# Patient Record
Sex: MALE | Race: ASIAN | NOT HISPANIC OR LATINO
[De-identification: names, ages, dates, MRNs, and addresses within clinical notes are randomized per-mention and may not be internally consistent; named-entity substitution may affect disease eponyms.]

---

## 2017-03-08 ENCOUNTER — RX RENEWAL (OUTPATIENT)
Age: 67
End: 2017-03-08

## 2017-03-08 ENCOUNTER — APPOINTMENT (OUTPATIENT)
Dept: INTERNAL MEDICINE | Facility: CLINIC | Age: 67
End: 2017-03-08

## 2017-03-08 ENCOUNTER — RESULT CHARGE (OUTPATIENT)
Age: 67
End: 2017-03-08

## 2017-03-08 VITALS
SYSTOLIC BLOOD PRESSURE: 120 MMHG | HEIGHT: 65 IN | HEART RATE: 90 BPM | BODY MASS INDEX: 23.82 KG/M2 | DIASTOLIC BLOOD PRESSURE: 70 MMHG | WEIGHT: 143 LBS

## 2017-03-08 DIAGNOSIS — M79.1 MYALGIA: ICD-10-CM

## 2017-03-08 LAB
GLUCOSE BLDC GLUCOMTR-MCNC: 210
HBA1C MFR BLD HPLC: 7.6

## 2017-03-09 ENCOUNTER — MEDICATION RENEWAL (OUTPATIENT)
Age: 67
End: 2017-03-09

## 2017-06-14 ENCOUNTER — RESULT CHARGE (OUTPATIENT)
Age: 67
End: 2017-06-14

## 2017-06-14 ENCOUNTER — APPOINTMENT (OUTPATIENT)
Dept: INTERNAL MEDICINE | Facility: CLINIC | Age: 67
End: 2017-06-14

## 2017-06-14 VITALS
WEIGHT: 145 LBS | HEIGHT: 65 IN | BODY MASS INDEX: 24.16 KG/M2 | SYSTOLIC BLOOD PRESSURE: 144 MMHG | DIASTOLIC BLOOD PRESSURE: 80 MMHG | HEART RATE: 84 BPM

## 2017-06-14 LAB
ALBUMIN SERPL ELPH-MCNC: 4.4 G/DL
ALDOLASE SERPL-CCNC: 9.1 U/L
ALP BLD-CCNC: 51 U/L
ALT SERPL-CCNC: 17 U/L
ANION GAP SERPL CALC-SCNC: 11 MMOL/L
AST SERPL-CCNC: 17 U/L
BILIRUB SERPL-MCNC: 0.3 MG/DL
BUN SERPL-MCNC: 19 MG/DL
CALCIUM SERPL-MCNC: 9.8 MG/DL
CHLORIDE SERPL-SCNC: 102 MMOL/L
CHOLEST SERPL-MCNC: 179 MG/DL
CHOLEST/HDLC SERPL: 3.5 RATIO
CK SERPL-CCNC: 210 U/L
CO2 SERPL-SCNC: 26 MMOL/L
CREAT SERPL-MCNC: 1 MG/DL
GLUCOSE BLDC GLUCOMTR-MCNC: 275
GLUCOSE SERPL-MCNC: 173 MG/DL
HBA1C MFR BLD HPLC: 7.3
HDLC SERPL-MCNC: 51 MG/DL
LDLC SERPL CALC-MCNC: 111 MG/DL
POTASSIUM SERPL-SCNC: 4.6 MMOL/L
PROT SERPL-MCNC: 6.9 G/DL
SODIUM SERPL-SCNC: 139 MMOL/L
TRIGL SERPL-MCNC: 85 MG/DL
TSH SERPL-ACNC: 2.28 UIU/ML

## 2017-09-11 ENCOUNTER — MESSAGE (OUTPATIENT)
Age: 67
End: 2017-09-11

## 2017-09-13 ENCOUNTER — APPOINTMENT (OUTPATIENT)
Dept: INTERNAL MEDICINE | Facility: CLINIC | Age: 67
End: 2017-09-13
Payer: MEDICARE

## 2017-09-13 ENCOUNTER — NON-APPOINTMENT (OUTPATIENT)
Age: 67
End: 2017-09-13

## 2017-09-13 VITALS — DIASTOLIC BLOOD PRESSURE: 82 MMHG | SYSTOLIC BLOOD PRESSURE: 142 MMHG

## 2017-09-13 VITALS
DIASTOLIC BLOOD PRESSURE: 90 MMHG | WEIGHT: 146 LBS | HEART RATE: 76 BPM | SYSTOLIC BLOOD PRESSURE: 142 MMHG | BODY MASS INDEX: 24.3 KG/M2

## 2017-09-13 DIAGNOSIS — S46.819A STRAIN OF OTHER MUSCLES, FASCIA AND TENDONS AT SHOULDER AND UPPER ARM LEVEL, UNSPECIFIED ARM, INITIAL ENCOUNTER: ICD-10-CM

## 2017-09-13 DIAGNOSIS — Z87.898 PERSONAL HISTORY OF OTHER SPECIFIED CONDITIONS: ICD-10-CM

## 2017-09-13 DIAGNOSIS — M26.629 ARTHRALGIA OF TEMPOROMANDIBULAR JOINT,: ICD-10-CM

## 2017-09-13 LAB
GLUCOSE BLDC GLUCOMTR-MCNC: 139
HBA1C MFR BLD HPLC: 7.3

## 2017-09-13 PROCEDURE — 99215 OFFICE O/P EST HI 40 MIN: CPT | Mod: 25

## 2017-09-13 PROCEDURE — 82962 GLUCOSE BLOOD TEST: CPT

## 2017-09-13 PROCEDURE — 83036 HEMOGLOBIN GLYCOSYLATED A1C: CPT | Mod: QW

## 2017-09-14 ENCOUNTER — MEDICATION RENEWAL (OUTPATIENT)
Age: 67
End: 2017-09-14

## 2017-09-27 ENCOUNTER — MEDICATION RENEWAL (OUTPATIENT)
Age: 67
End: 2017-09-27

## 2017-12-06 ENCOUNTER — APPOINTMENT (OUTPATIENT)
Dept: INTERNAL MEDICINE | Facility: CLINIC | Age: 67
End: 2017-12-06

## 2017-12-13 ENCOUNTER — APPOINTMENT (OUTPATIENT)
Dept: INTERNAL MEDICINE | Facility: CLINIC | Age: 67
End: 2017-12-13
Payer: MEDICARE

## 2017-12-13 VITALS
WEIGHT: 146 LBS | DIASTOLIC BLOOD PRESSURE: 70 MMHG | HEIGHT: 65 IN | SYSTOLIC BLOOD PRESSURE: 120 MMHG | HEART RATE: 97 BPM | BODY MASS INDEX: 24.32 KG/M2

## 2017-12-13 LAB
GLUCOSE BLDC GLUCOMTR-MCNC: 156
HBA1C MFR BLD HPLC: 7.3

## 2017-12-13 PROCEDURE — 99214 OFFICE O/P EST MOD 30 MIN: CPT | Mod: 25

## 2017-12-13 PROCEDURE — 82962 GLUCOSE BLOOD TEST: CPT

## 2017-12-13 PROCEDURE — 83036 HEMOGLOBIN GLYCOSYLATED A1C: CPT | Mod: QW

## 2017-12-20 ENCOUNTER — APPOINTMENT (OUTPATIENT)
Dept: NEUROLOGY | Facility: CLINIC | Age: 67
End: 2017-12-20

## 2018-01-04 ENCOUNTER — APPOINTMENT (OUTPATIENT)
Dept: NEUROLOGY | Facility: CLINIC | Age: 68
End: 2018-01-04

## 2018-01-10 ENCOUNTER — APPOINTMENT (OUTPATIENT)
Dept: ORTHOPEDIC SURGERY | Facility: CLINIC | Age: 68
End: 2018-01-10
Payer: MEDICARE

## 2018-01-10 VITALS
HEIGHT: 65 IN | DIASTOLIC BLOOD PRESSURE: 90 MMHG | BODY MASS INDEX: 23.32 KG/M2 | WEIGHT: 140 LBS | SYSTOLIC BLOOD PRESSURE: 163 MMHG | HEART RATE: 88 BPM

## 2018-01-10 DIAGNOSIS — Z78.9 OTHER SPECIFIED HEALTH STATUS: ICD-10-CM

## 2018-01-10 DIAGNOSIS — Z56.0 UNEMPLOYMENT, UNSPECIFIED: ICD-10-CM

## 2018-01-10 DIAGNOSIS — M65.331 TRIGGER FINGER, RIGHT MIDDLE FINGER: ICD-10-CM

## 2018-01-10 PROCEDURE — 99203 OFFICE O/P NEW LOW 30 MIN: CPT | Mod: 25

## 2018-01-10 PROCEDURE — 20550 NJX 1 TENDON SHEATH/LIGAMENT: CPT | Mod: RT

## 2018-01-10 SDOH — ECONOMIC STABILITY - INCOME SECURITY: UNEMPLOYMENT, UNSPECIFIED: Z56.0

## 2018-01-13 PROBLEM — Z56.0 UNEMPLOYED: Status: ACTIVE | Noted: 2018-01-10

## 2018-01-13 PROBLEM — Z78.9 EXERCISES OCCASIONALLY: Status: ACTIVE | Noted: 2018-01-10

## 2018-01-17 ENCOUNTER — APPOINTMENT (OUTPATIENT)
Dept: ORTHOPEDIC SURGERY | Facility: CLINIC | Age: 68
End: 2018-01-17
Payer: MEDICARE

## 2018-01-17 VITALS — WEIGHT: 140 LBS | HEIGHT: 65 IN | BODY MASS INDEX: 23.32 KG/M2

## 2018-01-17 VITALS — HEART RATE: 96 BPM | DIASTOLIC BLOOD PRESSURE: 74 MMHG | SYSTOLIC BLOOD PRESSURE: 137 MMHG

## 2018-01-17 DIAGNOSIS — M17.11 UNILATERAL PRIMARY OSTEOARTHRITIS, RIGHT KNEE: ICD-10-CM

## 2018-01-17 DIAGNOSIS — Z12.11 ENCOUNTER FOR SCREENING FOR MALIGNANT NEOPLASM OF COLON: ICD-10-CM

## 2018-01-17 PROCEDURE — 99214 OFFICE O/P EST MOD 30 MIN: CPT | Mod: 25

## 2018-01-17 PROCEDURE — 73564 X-RAY EXAM KNEE 4 OR MORE: CPT | Mod: RT

## 2018-01-17 PROCEDURE — 73030 X-RAY EXAM OF SHOULDER: CPT | Mod: RT

## 2018-01-17 PROCEDURE — 20610 DRAIN/INJ JOINT/BURSA W/O US: CPT | Mod: 59,RT

## 2018-01-29 ENCOUNTER — APPOINTMENT (OUTPATIENT)
Dept: ORTHOPEDIC SURGERY | Facility: CLINIC | Age: 68
End: 2018-01-29
Payer: MEDICARE

## 2018-01-29 VITALS
BODY MASS INDEX: 22.5 KG/M2 | WEIGHT: 140 LBS | HEART RATE: 94 BPM | SYSTOLIC BLOOD PRESSURE: 150 MMHG | DIASTOLIC BLOOD PRESSURE: 89 MMHG | HEIGHT: 66 IN

## 2018-01-29 PROCEDURE — 99215 OFFICE O/P EST HI 40 MIN: CPT

## 2018-01-29 PROCEDURE — 72100 X-RAY EXAM L-S SPINE 2/3 VWS: CPT

## 2018-02-05 ENCOUNTER — APPOINTMENT (OUTPATIENT)
Dept: ORTHOPEDIC SURGERY | Facility: CLINIC | Age: 68
End: 2018-02-05
Payer: MEDICARE

## 2018-02-05 PROCEDURE — 99215 OFFICE O/P EST HI 40 MIN: CPT

## 2018-02-12 ENCOUNTER — RX RENEWAL (OUTPATIENT)
Age: 68
End: 2018-02-12

## 2018-02-26 ENCOUNTER — MESSAGE (OUTPATIENT)
Age: 68
End: 2018-02-26

## 2018-02-28 ENCOUNTER — APPOINTMENT (OUTPATIENT)
Dept: INTERNAL MEDICINE | Facility: CLINIC | Age: 68
End: 2018-02-28
Payer: MEDICARE

## 2018-02-28 VITALS
BODY MASS INDEX: 23.14 KG/M2 | DIASTOLIC BLOOD PRESSURE: 70 MMHG | HEIGHT: 66 IN | SYSTOLIC BLOOD PRESSURE: 126 MMHG | HEART RATE: 70 BPM | WEIGHT: 144 LBS

## 2018-02-28 DIAGNOSIS — M72.0 PALMAR FASCIAL FIBROMATOSIS [DUPUYTREN]: ICD-10-CM

## 2018-02-28 LAB
ALBUMIN SERPL ELPH-MCNC: 4.6 G/DL
ALP BLD-CCNC: 49 U/L
ALT SERPL-CCNC: 33 U/L
ANION GAP SERPL CALC-SCNC: 12 MMOL/L
AST SERPL-CCNC: 23 U/L
BASOPHILS # BLD AUTO: 0.06 K/UL
BASOPHILS NFR BLD AUTO: 0.7 %
BILIRUB SERPL-MCNC: 0.3 MG/DL
BUN SERPL-MCNC: 20 MG/DL
CALCIUM SERPL-MCNC: 9.3 MG/DL
CHLORIDE SERPL-SCNC: 100 MMOL/L
CHOLEST SERPL-MCNC: 178 MG/DL
CHOLEST/HDLC SERPL: 4.2 RATIO
CO2 SERPL-SCNC: 27 MMOL/L
CREAT SERPL-MCNC: 0.96 MG/DL
CREAT SPEC-SCNC: 51 MG/DL
EOSINOPHIL # BLD AUTO: 0.29 K/UL
EOSINOPHIL NFR BLD AUTO: 3.2 %
GLUCOSE SERPL-MCNC: 150 MG/DL
HBA1C MFR BLD HPLC: 8.3 %
HCT VFR BLD CALC: 44.4 %
HDLC SERPL-MCNC: 42 MG/DL
HGB BLD-MCNC: 14.1 G/DL
IMM GRANULOCYTES NFR BLD AUTO: 0.3 %
LDLC SERPL CALC-MCNC: 95 MG/DL
LYMPHOCYTES # BLD AUTO: 3.77 K/UL
LYMPHOCYTES NFR BLD AUTO: 41.1 %
MAN DIFF?: NORMAL
MCHC RBC-ENTMCNC: 27.8 PG
MCHC RBC-ENTMCNC: 31.8 GM/DL
MCV RBC AUTO: 87.4 FL
MICROALBUMIN 24H UR DL<=1MG/L-MCNC: 0.5 MG/DL
MICROALBUMIN/CREAT 24H UR-RTO: 10 MG/G
MONOCYTES # BLD AUTO: 0.52 K/UL
MONOCYTES NFR BLD AUTO: 5.7 %
NEUTROPHILS # BLD AUTO: 4.51 K/UL
NEUTROPHILS NFR BLD AUTO: 49 %
PLATELET # BLD AUTO: 326 K/UL
POTASSIUM SERPL-SCNC: 4.7 MMOL/L
PROT SERPL-MCNC: 7.5 G/DL
PSA SERPL-MCNC: 1.79 NG/ML
RBC # BLD: 5.08 M/UL
RBC # FLD: 13.4 %
SODIUM SERPL-SCNC: 139 MMOL/L
TRIGL SERPL-MCNC: 203 MG/DL
WBC # FLD AUTO: 9.18 K/UL

## 2018-02-28 PROCEDURE — 99214 OFFICE O/P EST MOD 30 MIN: CPT

## 2018-03-02 ENCOUNTER — RECORD ABSTRACTING (OUTPATIENT)
Age: 68
End: 2018-03-02

## 2018-03-06 ENCOUNTER — APPOINTMENT (OUTPATIENT)
Dept: NEUROLOGY | Facility: CLINIC | Age: 68
End: 2018-03-06
Payer: MEDICARE

## 2018-03-06 VITALS
DIASTOLIC BLOOD PRESSURE: 90 MMHG | HEIGHT: 66 IN | SYSTOLIC BLOOD PRESSURE: 163 MMHG | WEIGHT: 146 LBS | HEART RATE: 69 BPM | BODY MASS INDEX: 23.46 KG/M2

## 2018-03-06 VITALS
HEIGHT: 66 IN | BODY MASS INDEX: 23.46 KG/M2 | WEIGHT: 146 LBS | SYSTOLIC BLOOD PRESSURE: 163 MMHG | DIASTOLIC BLOOD PRESSURE: 89 MMHG | HEART RATE: 69 BPM

## 2018-03-06 PROCEDURE — 93892 TCD EMBOLI DETECT W/O INJ: CPT

## 2018-03-06 PROCEDURE — 93880 EXTRACRANIAL BILAT STUDY: CPT

## 2018-03-06 PROCEDURE — 99215 OFFICE O/P EST HI 40 MIN: CPT

## 2018-03-06 PROCEDURE — 93886 INTRACRANIAL COMPLETE STUDY: CPT

## 2018-03-14 ENCOUNTER — APPOINTMENT (OUTPATIENT)
Dept: ORTHOPEDIC SURGERY | Facility: CLINIC | Age: 68
End: 2018-03-14
Payer: MEDICARE

## 2018-03-14 DIAGNOSIS — M17.12 UNILATERAL PRIMARY OSTEOARTHRITIS, LEFT KNEE: ICD-10-CM

## 2018-03-14 DIAGNOSIS — M25.511 PAIN IN RIGHT SHOULDER: ICD-10-CM

## 2018-03-14 PROCEDURE — 99214 OFFICE O/P EST MOD 30 MIN: CPT | Mod: 25

## 2018-03-14 PROCEDURE — 20610 DRAIN/INJ JOINT/BURSA W/O US: CPT | Mod: LT

## 2018-03-14 PROCEDURE — 73564 X-RAY EXAM KNEE 4 OR MORE: CPT | Mod: LT

## 2018-03-22 ENCOUNTER — OTHER (OUTPATIENT)
Age: 68
End: 2018-03-22

## 2018-05-21 ENCOUNTER — MEDICATION RENEWAL (OUTPATIENT)
Age: 68
End: 2018-05-21

## 2018-05-30 ENCOUNTER — APPOINTMENT (OUTPATIENT)
Dept: INTERNAL MEDICINE | Facility: CLINIC | Age: 68
End: 2018-05-30
Payer: MEDICARE

## 2018-05-30 VITALS
DIASTOLIC BLOOD PRESSURE: 90 MMHG | HEIGHT: 66 IN | SYSTOLIC BLOOD PRESSURE: 140 MMHG | BODY MASS INDEX: 23.14 KG/M2 | WEIGHT: 144 LBS | HEART RATE: 74 BPM

## 2018-05-30 VITALS — SYSTOLIC BLOOD PRESSURE: 130 MMHG | DIASTOLIC BLOOD PRESSURE: 70 MMHG

## 2018-05-30 DIAGNOSIS — Z87.19 PERSONAL HISTORY OF OTHER DISEASES OF THE DIGESTIVE SYSTEM: ICD-10-CM

## 2018-05-30 DIAGNOSIS — K21.9 GASTRO-ESOPHAGEAL REFLUX DISEASE W/OUT ESOPHAGITIS: ICD-10-CM

## 2018-05-30 LAB
GLUCOSE BLDC GLUCOMTR-MCNC: 137
HBA1C MFR BLD HPLC: 7.7

## 2018-05-30 PROCEDURE — 82962 GLUCOSE BLOOD TEST: CPT

## 2018-05-30 PROCEDURE — 99214 OFFICE O/P EST MOD 30 MIN: CPT | Mod: 25

## 2018-05-30 PROCEDURE — 83036 HEMOGLOBIN GLYCOSYLATED A1C: CPT | Mod: QW

## 2018-05-30 NOTE — PHYSICAL EXAM
[No Acute Distress] : no acute distress [No JVD] : no jugular venous distention [Supple] : supple [Thyroid Normal, No Nodules] : the thyroid was normal and there were no nodules present [No Respiratory Distress] : no respiratory distress  [Normal Rate] : normal rate  [Regular Rhythm] : with a regular rhythm [Normal S1, S2] : normal S1 and S2 [Pedal Pulses Present] : the pedal pulses are present [No Edema] : there was no peripheral edema [Soft] : abdomen soft [Non Tender] : non-tender [Normal Bowel Sounds] : normal bowel sounds [Comprehensive Foot Exam Normal] : Right and left foot were examined and both feet are normal. No ulcers in either foot. Toes are normal and with full ROM.  Normal tactile sensation with monofilament testing throughout both feet

## 2018-05-30 NOTE — REVIEW OF SYSTEMS
[Joint Pain] : joint pain [Joint Stiffness] : joint stiffness [Back Pain] : back pain [Negative] : Psychiatric [Heartburn] : no heartburn [Dysuria] : no dysuria [Muscle Weakness] : no muscle weakness [Skin Rash] : no skin rash [Headache] : no headache [Dizziness] : no dizziness [Fainting] : no fainting [Confusion] : no confusion

## 2018-06-12 ENCOUNTER — FORM ENCOUNTER (OUTPATIENT)
Age: 68
End: 2018-06-12

## 2018-06-13 ENCOUNTER — APPOINTMENT (OUTPATIENT)
Dept: ULTRASOUND IMAGING | Facility: CLINIC | Age: 68
End: 2018-06-13
Payer: MEDICARE

## 2018-06-13 ENCOUNTER — OUTPATIENT (OUTPATIENT)
Dept: OUTPATIENT SERVICES | Facility: HOSPITAL | Age: 68
LOS: 1 days | End: 2018-06-13
Payer: MEDICARE

## 2018-06-13 DIAGNOSIS — E11.9 TYPE 2 DIABETES MELLITUS WITHOUT COMPLICATIONS: ICD-10-CM

## 2018-06-13 PROCEDURE — 76536 US EXAM OF HEAD AND NECK: CPT

## 2018-06-13 PROCEDURE — 76536 US EXAM OF HEAD AND NECK: CPT | Mod: 26

## 2018-06-15 ENCOUNTER — APPOINTMENT (OUTPATIENT)
Dept: ORTHOPEDIC SURGERY | Facility: CLINIC | Age: 68
End: 2018-06-15
Payer: MEDICARE

## 2018-06-15 PROCEDURE — 99213 OFFICE O/P EST LOW 20 MIN: CPT | Mod: 25

## 2018-06-15 PROCEDURE — 20610 DRAIN/INJ JOINT/BURSA W/O US: CPT | Mod: RT

## 2018-06-15 RX ORDER — CLINDAMYCIN HYDROCHLORIDE 150 MG/1
150 CAPSULE ORAL
Qty: 21 | Refills: 0 | Status: COMPLETED | COMMUNITY
Start: 2018-05-23

## 2018-07-02 ENCOUNTER — MEDICATION RENEWAL (OUTPATIENT)
Age: 68
End: 2018-07-02

## 2018-07-23 ENCOUNTER — MEDICATION RENEWAL (OUTPATIENT)
Age: 68
End: 2018-07-23

## 2018-09-05 ENCOUNTER — APPOINTMENT (OUTPATIENT)
Dept: INTERNAL MEDICINE | Facility: CLINIC | Age: 68
End: 2018-09-05
Payer: MEDICARE

## 2018-09-05 VITALS
SYSTOLIC BLOOD PRESSURE: 150 MMHG | WEIGHT: 144 LBS | HEIGHT: 66 IN | DIASTOLIC BLOOD PRESSURE: 70 MMHG | BODY MASS INDEX: 23.14 KG/M2 | HEART RATE: 81 BPM

## 2018-09-05 VITALS — DIASTOLIC BLOOD PRESSURE: 72 MMHG | SYSTOLIC BLOOD PRESSURE: 140 MMHG

## 2018-09-05 PROCEDURE — 82962 GLUCOSE BLOOD TEST: CPT

## 2018-09-05 PROCEDURE — 99214 OFFICE O/P EST MOD 30 MIN: CPT

## 2018-09-05 PROCEDURE — 83036 HEMOGLOBIN GLYCOSYLATED A1C: CPT | Mod: QW

## 2018-09-05 NOTE — ASSESSMENT
[FreeTextEntry1] : 1.DM: much improved HbA1c with addition of glimeperide. will opt to hold for now and cont single agent. repeat HbA1c next visit\par 2.HTN: borderline BP..?ideal target 130/80\par consider addition of low dose HCTZ vs incr dose ACE inhib\par 3.Atypical CP: ++high risk for CAD with hx CVA/DM/HTN\par stress thallium\par 4 thyroid: will need f/u US DEC 2018\par 5.leg cramps: elicited by stretch only in morning in bed.\par advised gentle stretch/hydration/mg suppl\par CPE next visit\par flu vaccine in OCT/NOV

## 2018-09-05 NOTE — REVIEW OF SYSTEMS
[Chest Pain] : chest pain [Nocturia] : nocturia [Joint Stiffness] : joint stiffness [Negative] : Gastrointestinal [Fever] : no fever [Chills] : no chills [Palpitations] : no palpitations [Claudication] : no  leg claudication [Lower Ext Edema] : no lower extremity edema [Shortness Of Breath] : no shortness of breath [Cough] : no cough [Dyspnea on Exertion] : not dyspnea on exertion [Dysuria] : no dysuria [Skin Rash] : no skin rash [Dizziness] : no dizziness [Fainting] : no fainting

## 2018-09-05 NOTE — HISTORY OF PRESENT ILLNESS
[FreeTextEntry1] : here for medical f/u [de-identified] : he reports that he has been well. cont to go to the gym\par has noted occas left sided chest pain..usu at rest /not exertional. \par cont with early morning leg cramps ..elicited by stretching. no claudication or leg swelling

## 2018-09-05 NOTE — PHYSICAL EXAM
[No Acute Distress] : no acute distress [No JVD] : no jugular venous distention [Supple] : supple [No Respiratory Distress] : no respiratory distress  [Clear to Auscultation] : lungs were clear to auscultation bilaterally [Normal Rate] : normal rate  [Regular Rhythm] : with a regular rhythm [Normal S1, S2] : normal S1 and S2 [Pedal Pulses Present] : the pedal pulses are present [No Edema] : there was no peripheral edema [No Extremity Clubbing/Cyanosis] : no extremity clubbing/cyanosis [Soft] : abdomen soft [Non Tender] : non-tender [Normal Bowel Sounds] : normal bowel sounds [No Rash] : no rash [de-identified] : neg for calf tenderness/neg for homans

## 2018-09-06 LAB
GLUCOSE BLDC GLUCOMTR-MCNC: 109
HBA1C MFR BLD HPLC: 6.5

## 2018-10-25 ENCOUNTER — OUTPATIENT (OUTPATIENT)
Dept: OUTPATIENT SERVICES | Facility: HOSPITAL | Age: 68
LOS: 1 days | End: 2018-10-25
Payer: MEDICARE

## 2018-10-25 ENCOUNTER — APPOINTMENT (OUTPATIENT)
Dept: CV DIAGNOSTICS | Facility: HOSPITAL | Age: 68
End: 2018-10-25

## 2018-10-25 DIAGNOSIS — I25.10 ATHEROSCLEROTIC HEART DISEASE OF NATIVE CORONARY ARTERY WITHOUT ANGINA PECTORIS: ICD-10-CM

## 2018-10-25 PROCEDURE — 78452 HT MUSCLE IMAGE SPECT MULT: CPT | Mod: 26

## 2018-10-25 PROCEDURE — 78452 HT MUSCLE IMAGE SPECT MULT: CPT

## 2018-10-25 PROCEDURE — 93017 CV STRESS TEST TRACING ONLY: CPT

## 2018-10-25 PROCEDURE — 93018 CV STRESS TEST I&R ONLY: CPT

## 2018-10-25 PROCEDURE — A9505: CPT

## 2018-10-25 PROCEDURE — 93016 CV STRESS TEST SUPVJ ONLY: CPT

## 2018-10-25 PROCEDURE — A9500: CPT

## 2018-12-19 ENCOUNTER — APPOINTMENT (OUTPATIENT)
Dept: INTERNAL MEDICINE | Facility: CLINIC | Age: 68
End: 2018-12-19
Payer: MEDICARE

## 2018-12-19 VITALS
DIASTOLIC BLOOD PRESSURE: 70 MMHG | BODY MASS INDEX: 23.46 KG/M2 | SYSTOLIC BLOOD PRESSURE: 130 MMHG | HEIGHT: 66 IN | OXYGEN SATURATION: 97 % | HEART RATE: 92 BPM | WEIGHT: 146 LBS

## 2018-12-19 LAB
25(OH)D3 SERPL-MCNC: 23.5 NG/ML
ALBUMIN SERPL ELPH-MCNC: 4.5 G/DL
ALP BLD-CCNC: 57 U/L
ALT SERPL-CCNC: 17 U/L
ANION GAP SERPL CALC-SCNC: 11 MMOL/L
AST SERPL-CCNC: 15 U/L
BASOPHILS # BLD AUTO: 0.04 K/UL
BASOPHILS NFR BLD AUTO: 0.6 %
BILIRUB SERPL-MCNC: 0.5 MG/DL
BUN SERPL-MCNC: 15 MG/DL
CALCIUM SERPL-MCNC: 9.3 MG/DL
CHLORIDE SERPL-SCNC: 102 MMOL/L
CHOLEST SERPL-MCNC: 175 MG/DL
CHOLEST/HDLC SERPL: 3.8 RATIO
CO2 SERPL-SCNC: 28 MMOL/L
CREAT SERPL-MCNC: 0.97 MG/DL
CREAT SPEC-SCNC: 50 MG/DL
EOSINOPHIL # BLD AUTO: 0.2 K/UL
EOSINOPHIL NFR BLD AUTO: 2.8 %
GLUCOSE SERPL-MCNC: 170 MG/DL
HBA1C MFR BLD HPLC: 8.2 %
HCT VFR BLD CALC: 45.3 %
HDLC SERPL-MCNC: 46 MG/DL
HGB BLD-MCNC: 14.8 G/DL
IMM GRANULOCYTES NFR BLD AUTO: 0.4 %
LDLC SERPL CALC-MCNC: 91 MG/DL
LYMPHOCYTES # BLD AUTO: 2.84 K/UL
LYMPHOCYTES NFR BLD AUTO: 39.7 %
MAN DIFF?: NORMAL
MCHC RBC-ENTMCNC: 28.1 PG
MCHC RBC-ENTMCNC: 32.7 GM/DL
MCV RBC AUTO: 86 FL
MICROALBUMIN 24H UR DL<=1MG/L-MCNC: <1.2 MG/DL
MICROALBUMIN/CREAT 24H UR-RTO: NORMAL
MONOCYTES # BLD AUTO: 0.62 K/UL
MONOCYTES NFR BLD AUTO: 8.7 %
NEUTROPHILS # BLD AUTO: 3.42 K/UL
NEUTROPHILS NFR BLD AUTO: 47.8 %
PLATELET # BLD AUTO: 241 K/UL
POTASSIUM SERPL-SCNC: 4.8 MMOL/L
PROT SERPL-MCNC: 7.2 G/DL
RBC # BLD: 5.27 M/UL
RBC # FLD: 12.8 %
SODIUM SERPL-SCNC: 141 MMOL/L
TRIGL SERPL-MCNC: 188 MG/DL
TSH SERPL-ACNC: 2.78 UIU/ML
WBC # FLD AUTO: 7.15 K/UL

## 2018-12-19 PROCEDURE — G0444 DEPRESSION SCREEN ANNUAL: CPT | Mod: 59

## 2018-12-19 PROCEDURE — G0439: CPT

## 2018-12-19 NOTE — REVIEW OF SYSTEMS
[Fever] : no fever [Chills] : no chills [Chest Pain] : no chest pain [Palpitations] : no palpitations [Claudication] : no  leg claudication [Lower Ext Edema] : no lower extremity edema [Dysuria] : no dysuria [Incontinence] : no incontinence [Nocturia] : nocturia [Hematuria] : no hematuria [Joint Stiffness] : joint stiffness [Muscle Pain] : no muscle pain [Back Pain] : back pain [Itching] : no itching [Skin Rash] : no skin rash [Headache] : no headache [Dizziness] : no dizziness [Fainting] : no fainting [Confusion] : no confusion [Unsteady Walk] : ataxia [Negative] : Heme/Lymph

## 2018-12-19 NOTE — ASSESSMENT
[FreeTextEntry1] : 68 male with hx CVA ,DM, HTN here for CPE\par 1.DM: increase in HbA1c off low dose glimeperide\par pt denies diet changes\par resume glimeperide 1mg QD\par check FS \par 2,HTN: cont med\par s/p cardiac stress test ..fixed defects...need aggressive risk factor modification\par 3.Hyperlipidemia: on statin . LDL<100\par 4.EYEs: UTD 2018 per pt\par need report\par 5.colon UTD 2016\par 6.thyroid: nodule on US..needs 6 months f/u per radiology\par 7.s/p flu vaccine\par discussed shingrix. pt with hx shingles\par 8.Hx LBP: s/p eval sxs improved  but chronic\par cont monitor gait/home safety

## 2018-12-19 NOTE — HEALTH RISK ASSESSMENT
[Good] : ~his/her~  mood as  good [] : No [No falls in past year] : Patient reported no falls in the past year [2] : 1) Little interest or pleasure doing things for more than half of the days (2) [0] : 2) Feeling down, depressed, or hopeless: Not at all (0) [Discussed at today's visit] : Advance Directives Discussed at today's visit [Designated Healthcare Proxy] : Designated healthcare proxy [Relationship: ___] : Relationship: [unfilled] [Aggressive treatment] : aggressive treatment

## 2018-12-19 NOTE — PHYSICAL EXAM
[No Acute Distress] : no acute distress [Well Nourished] : well nourished [Normal Sclera/Conjunctiva] : normal sclera/conjunctiva [PERRL] : pupils equal round and reactive to light [EOMI] : extraocular movements intact [Normal Oropharynx] : the oropharynx was normal [Supple] : supple [No Lymphadenopathy] : no lymphadenopathy [No Respiratory Distress] : no respiratory distress  [Clear to Auscultation] : lungs were clear to auscultation bilaterally [Normal Rate] : normal rate  [Regular Rhythm] : with a regular rhythm [Normal S1, S2] : normal S1 and S2 [No Carotid Bruits] : no carotid bruits [Pedal Pulses Present] : the pedal pulses are present [No Edema] : there was no peripheral edema [No Extremity Clubbing/Cyanosis] : no extremity clubbing/cyanosis [Soft] : abdomen soft [Non Tender] : non-tender [Normal Bowel Sounds] : normal bowel sounds [Normal Anterior Cervical Nodes] : no anterior cervical lymphadenopathy [No CVA Tenderness] : no CVA  tenderness [No Spinal Tenderness] : no spinal tenderness [No Rash] : no rash [Coordination Grossly Intact] : coordination grossly intact [Normal Affect] : the affect was normal [Alert and Oriented x3] : oriented to person, place, and time [Right Foot Was Examined] : Right foot ~C was examined [Left Foot Was Examined] : left foot ~C was examined [None] : no ulcers in either foot were found [] : both feet [de-identified] : neg SLR bilateral [de-identified] : FROM  hips and knee. sl crepitus R>L. DIP joint swollen /NT

## 2018-12-19 NOTE — HISTORY OF PRESENT ILLNESS
[FreeTextEntry1] : here for his CPE and medical management [de-identified] : no acute issues. cont to have chronic recurring LBP right sided that radiates down buttock to back of knee\par he cont to go to the gym daily...exercises with equipment and then pool \par denies incont \par has had some near falls in the house due to some unsteadiness of his lower ext..this is chronic but improved since his CVA\par he uses cane when he goes out of house for security\par denies changes in diet of indiscretions. exercise is same\par

## 2019-01-01 ENCOUNTER — FORM ENCOUNTER (OUTPATIENT)
Age: 69
End: 2019-01-01

## 2019-01-02 ENCOUNTER — OUTPATIENT (OUTPATIENT)
Dept: OUTPATIENT SERVICES | Facility: HOSPITAL | Age: 69
LOS: 1 days | End: 2019-01-02
Payer: MEDICARE

## 2019-01-02 ENCOUNTER — APPOINTMENT (OUTPATIENT)
Dept: ULTRASOUND IMAGING | Facility: CLINIC | Age: 69
End: 2019-01-02
Payer: MEDICARE

## 2019-01-02 DIAGNOSIS — Z00.8 ENCOUNTER FOR OTHER GENERAL EXAMINATION: ICD-10-CM

## 2019-01-02 PROCEDURE — 76536 US EXAM OF HEAD AND NECK: CPT | Mod: 26

## 2019-01-02 PROCEDURE — 76536 US EXAM OF HEAD AND NECK: CPT

## 2019-01-28 ENCOUNTER — RX RENEWAL (OUTPATIENT)
Age: 69
End: 2019-01-28

## 2019-02-25 ENCOUNTER — MEDICATION RENEWAL (OUTPATIENT)
Age: 69
End: 2019-02-25

## 2019-04-01 ENCOUNTER — RX RENEWAL (OUTPATIENT)
Age: 69
End: 2019-04-01

## 2019-05-01 ENCOUNTER — APPOINTMENT (OUTPATIENT)
Dept: INTERNAL MEDICINE | Facility: CLINIC | Age: 69
End: 2019-05-01
Payer: MEDICARE

## 2019-05-01 VITALS
SYSTOLIC BLOOD PRESSURE: 150 MMHG | BODY MASS INDEX: 24.11 KG/M2 | OXYGEN SATURATION: 97 % | HEART RATE: 86 BPM | WEIGHT: 150 LBS | HEIGHT: 66 IN | DIASTOLIC BLOOD PRESSURE: 80 MMHG

## 2019-05-01 VITALS — DIASTOLIC BLOOD PRESSURE: 78 MMHG | SYSTOLIC BLOOD PRESSURE: 125 MMHG

## 2019-05-01 LAB
GLUCOSE BLDC GLUCOMTR-MCNC: 146
HBA1C MFR BLD HPLC: 6.6

## 2019-05-01 PROCEDURE — 99214 OFFICE O/P EST MOD 30 MIN: CPT | Mod: 25

## 2019-05-01 PROCEDURE — 83036 HEMOGLOBIN GLYCOSYLATED A1C: CPT | Mod: QW

## 2019-05-01 PROCEDURE — 82962 GLUCOSE BLOOD TEST: CPT

## 2019-05-01 NOTE — PHYSICAL EXAM
[No Acute Distress] : no acute distress [Well Nourished] : well nourished [Normal Sclera/Conjunctiva] : normal sclera/conjunctiva [No JVD] : no jugular venous distention [Supple] : supple [No Respiratory Distress] : no respiratory distress  [Clear to Auscultation] : lungs were clear to auscultation bilaterally [Normal Rate] : normal rate  [Normal S1, S2] : normal S1 and S2 [Regular Rhythm] : with a regular rhythm [Pedal Pulses Present] : the pedal pulses are present [No Edema] : there was no peripheral edema [Non Tender] : non-tender [No Extremity Clubbing/Cyanosis] : no extremity clubbing/cyanosis [Normal Bowel Sounds] : normal bowel sounds [No CVA Tenderness] : no CVA  tenderness [No Spinal Tenderness] : no spinal tenderness [Alert and Oriented x3] : oriented to person, place, and time

## 2019-05-01 NOTE — ASSESSMENT
[FreeTextEntry1] : 1.DM: much lowered hbA1c cw with dec 2018. suspect pt has had some low glucose due to delayed or skipped lunch\par counselled re risks of hypoglycemia. he must eat meals on time. he must not skip meals. he must carry snacks.\par would leave him with the 1mg glimeperide if he can follow the schedule to maintain optimal DM control given his hx CAD, CVA . when we held the glimeperide, his hbA1c increased\par pt verbalizes understanding of plan and will complly\par 2. LBP/ spinal stenosis\par workup with new ortho\par some of his "imbalance"  per pt is due to radicular pain\par counselled re use of handrail/cane/support \par 3.HTN : good control

## 2019-05-01 NOTE — REVIEW OF SYSTEMS
[Fever] : no fever [Chills] : no chills [Chest Pain] : no chest pain [Vision Problems] : no vision problems [Claudication] : no  leg claudication [Lower Ext Edema] : no lower extremity edema [Shortness Of Breath] : no shortness of breath [Back Pain] : back pain [Dizziness] : no dizziness [Headache] : no headache [Fainting] : no fainting [Unsteady Walk] : ataxia [Negative] : Genitourinary [FreeTextEntry9] : chronic back pain..saw new ortho . wants another opinion before surgery.  [de-identified] : LLE weakness. some right LE pain radiating from LBP. no  incont

## 2019-05-01 NOTE — HISTORY OF PRESENT ILLNESS
[FreeTextEntry1] : here for f/u of DM [de-identified] : He reports 2 episodes falling..lost his balance going up front stoop ( no railing). no injuries per pt\par he cont to exercise at the gym daily. chronic LLE mild weakness\par he reports "episodes" not many where he felt sl lightheaded/hungry ...clearly  correlated with eating a later lunch or skipping lunch. he usus has a good size breakfast \par has not been checking FS\par has been snacking more. ++weight gain\par \par

## 2019-08-23 ENCOUNTER — APPOINTMENT (OUTPATIENT)
Dept: INTERNAL MEDICINE | Facility: CLINIC | Age: 69
End: 2019-08-23
Payer: MEDICARE

## 2019-08-23 VITALS — WEIGHT: 148 LBS | HEIGHT: 66 IN | BODY MASS INDEX: 23.78 KG/M2

## 2019-08-23 VITALS — SYSTOLIC BLOOD PRESSURE: 128 MMHG | DIASTOLIC BLOOD PRESSURE: 68 MMHG

## 2019-08-23 LAB
GLUCOSE BLDC GLUCOMTR-MCNC: 91
HBA1C MFR BLD HPLC: 6.4

## 2019-08-23 PROCEDURE — 99214 OFFICE O/P EST MOD 30 MIN: CPT | Mod: 25

## 2019-08-23 PROCEDURE — 83036 HEMOGLOBIN GLYCOSYLATED A1C: CPT | Mod: QW

## 2019-08-23 PROCEDURE — 82962 GLUCOSE BLOOD TEST: CPT

## 2019-08-26 NOTE — HISTORY OF PRESENT ILLNESS
[FreeTextEntry1] : Here to establish care - prior patient of Dr Lepe. [de-identified] : h/o HTN, DM,  HLD\par In addition: c/o also has leg cramps in the early am and foot numbness since his CVA - 2014\par He is followed by [facility] for his back - has spinal stenosis in [place] spine. \par Also followed by Dr Libman for CVA

## 2019-08-26 NOTE — PHYSICAL EXAM
[No Acute Distress] : no acute distress [Well-Appearing] : well-appearing [Normal Sclera/Conjunctiva] : normal sclera/conjunctiva [EOMI] : extraocular movements intact [Normal Oropharynx] : the oropharynx was normal [No JVD] : no jugular venous distention [No Lymphadenopathy] : no lymphadenopathy [Supple] : supple [No Respiratory Distress] : no respiratory distress  [Normal Rate] : normal rate  [No Accessory Muscle Use] : no accessory muscle use [Regular Rhythm] : with a regular rhythm [Normal S1, S2] : normal S1 and S2 [No Edema] : there was no peripheral edema [No Extremity Clubbing/Cyanosis] : no extremity clubbing/cyanosis [Soft] : abdomen soft [Non Tender] : non-tender [Normal Bowel Sounds] : normal bowel sounds [Normal Posterior Cervical Nodes] : no posterior cervical lymphadenopathy [No CVA Tenderness] : no CVA  tenderness [Normal Anterior Cervical Nodes] : no anterior cervical lymphadenopathy [No Spinal Tenderness] : no spinal tenderness [Grossly Normal Strength/Tone] : grossly normal strength/tone [No Rash] : no rash [Normal] : normal texture and mobility [Coordination Grossly Intact] : coordination grossly intact [Deep Tendon Reflexes (DTR)] : deep tendon reflexes were 2+ and symmetric [Comprehensive Foot Exam Normal] : Right and left foot were examined and both feet are normal. No ulcers in either foot. Toes are normal and with full ROM.  Normal tactile sensation with monofilament testing throughout both feet

## 2019-09-13 ENCOUNTER — RX RENEWAL (OUTPATIENT)
Age: 69
End: 2019-09-13

## 2019-09-17 LAB
ALBUMIN SERPL ELPH-MCNC: 4.4 G/DL
ALP BLD-CCNC: 49 U/L
ALT SERPL-CCNC: 18 U/L
ANION GAP SERPL CALC-SCNC: 13 MMOL/L
AST SERPL-CCNC: 18 U/L
BASOPHILS # BLD AUTO: 0.05 K/UL
BASOPHILS NFR BLD AUTO: 0.7 %
BILIRUB SERPL-MCNC: 0.4 MG/DL
BUN SERPL-MCNC: 13 MG/DL
CALCIUM SERPL-MCNC: 9.4 MG/DL
CHLORIDE SERPL-SCNC: 103 MMOL/L
CHOLEST SERPL-MCNC: 170 MG/DL
CHOLEST/HDLC SERPL: 4.2 RATIO
CO2 SERPL-SCNC: 26 MMOL/L
CREAT SERPL-MCNC: 1.01 MG/DL
EOSINOPHIL # BLD AUTO: 0.27 K/UL
EOSINOPHIL NFR BLD AUTO: 3.7 %
GLUCOSE SERPL-MCNC: 122 MG/DL
HCT VFR BLD CALC: 45.2 %
HDLC SERPL-MCNC: 41 MG/DL
HGB BLD-MCNC: 14.2 G/DL
IMM GRANULOCYTES NFR BLD AUTO: 0.1 %
LDLC SERPL CALC-MCNC: 83 MG/DL
LYMPHOCYTES # BLD AUTO: 2.98 K/UL
LYMPHOCYTES NFR BLD AUTO: 41.2 %
MAN DIFF?: NORMAL
MCHC RBC-ENTMCNC: 28.2 PG
MCHC RBC-ENTMCNC: 31.4 GM/DL
MCV RBC AUTO: 89.7 FL
MONOCYTES # BLD AUTO: 0.39 K/UL
MONOCYTES NFR BLD AUTO: 5.4 %
NEUTROPHILS # BLD AUTO: 3.54 K/UL
NEUTROPHILS NFR BLD AUTO: 48.9 %
PLATELET # BLD AUTO: 272 K/UL
POTASSIUM SERPL-SCNC: 4.8 MMOL/L
PROT SERPL-MCNC: 6.9 G/DL
RBC # BLD: 5.04 M/UL
RBC # FLD: 13 %
SODIUM SERPL-SCNC: 142 MMOL/L
TRIGL SERPL-MCNC: 228 MG/DL
TSH SERPL-ACNC: 2.31 UIU/ML
WBC # FLD AUTO: 7.24 K/UL

## 2019-10-22 ENCOUNTER — APPOINTMENT (OUTPATIENT)
Dept: ORTHOPEDIC SURGERY | Facility: CLINIC | Age: 69
End: 2019-10-22
Payer: MEDICARE

## 2019-10-22 PROCEDURE — 20610 DRAIN/INJ JOINT/BURSA W/O US: CPT | Mod: RT

## 2019-10-22 PROCEDURE — 99213 OFFICE O/P EST LOW 20 MIN: CPT | Mod: 25

## 2019-10-22 NOTE — HISTORY OF PRESENT ILLNESS
[de-identified] : 68 y/o M presents f/u R shoulder pain. Staes symptoms are stable since last visit on 03/14/18. he is s/p stroke 3 years ago in which he had left sided weakness, improving, ambulates with cane. R shoulder pain localized to lateral aspect, worse with reaching above head and across body. No recently injuries reported. He has had one cortisone injection in December 2017 which provided relief in symptoms for 2 months. Denies numbness/tingling. Had MRI right shoulder that shows rotator cuff tear. Degenerative labral tear\par \par he had relief with inj previously and is requesting repeat inj today

## 2019-10-22 NOTE — PHYSICAL EXAM
[de-identified] : Right shoulder exam\par \par Inspection: No swelling, ecchymosis or gross deformity.\par Skin: No masses, No lesions\par Neck: Negative Spurlings, full ROM without pain\par Tenderness: No bicipital tenderness, no tenderness to the greater tuberosity/RTC insertion, no anterior shoulder/lesser tuberosity tenderness. No tenderness SC joint, clavicle, AC joint.\par ROM: 160/60/T6\par Impingement tests: Positive Greenberg\par AC Joint: no pain with cross arm testing\par Biceps: Negative speed\par Strength: 5/5 abduction, external rotation, and internal rotation\par Neuro: AIN, PIN, Ulnar nerve motor intact\par Sensation: Intact to light touch in radial, median, ulnar, and axillary nerve distributions\par Vasc: 2+ radial pulse. \par

## 2019-10-22 NOTE — DISCUSSION/SUMMARY
[de-identified] : 68 yo Male with right shoulder rotator cuff tear we discussed the nonoperative treatment with activity modification therapy exercises medications and injections. We discussed surgical treatment would be right shoulder arthroscopy subacromial decompression and rotator cuff repair he's not interested in surgery at this time he is requesting repeat injection\par \par Injection: Right shoulder (Subacromial).\par Indication rotator cuff tear\par \par A discussion was had with the patient regarding this procedure and all questions were answered. All risks, benefits and alternatives were discussed. These included but were not limited to bleeding, infection, and allergic reaction. Alcohol was used to clean the skin, and betadine was used to sterilize and prep the area in the posterior aspect of the right shoulder. Ethyl chloride spray was then used as a topical anesthetic. A 21-gauge needle was used to inject 4cc of 1% lidocaine and 1cc of 40mg/ml methylprednisolone into the right subacromial space. A sterile bandage was then applied. The patient tolerated the procedure well and there were no complications. \par \par Followup as needed all questions answered remains symptomatic we'll again discuss rotator cuff repair.

## 2019-12-17 ENCOUNTER — APPOINTMENT (OUTPATIENT)
Dept: INTERNAL MEDICINE | Facility: CLINIC | Age: 69
End: 2019-12-17

## 2019-12-31 ENCOUNTER — NON-APPOINTMENT (OUTPATIENT)
Age: 69
End: 2019-12-31

## 2019-12-31 ENCOUNTER — APPOINTMENT (OUTPATIENT)
Dept: INTERNAL MEDICINE | Facility: CLINIC | Age: 69
End: 2019-12-31
Payer: MEDICARE

## 2019-12-31 ENCOUNTER — LABORATORY RESULT (OUTPATIENT)
Age: 69
End: 2019-12-31

## 2019-12-31 VITALS
HEART RATE: 75 BPM | OXYGEN SATURATION: 98 % | WEIGHT: 150 LBS | DIASTOLIC BLOOD PRESSURE: 80 MMHG | SYSTOLIC BLOOD PRESSURE: 150 MMHG | HEIGHT: 66 IN | BODY MASS INDEX: 24.11 KG/M2

## 2019-12-31 DIAGNOSIS — B97.89 ACUTE UPPER RESPIRATORY INFECTION, UNSPECIFIED: ICD-10-CM

## 2019-12-31 DIAGNOSIS — J06.9 ACUTE UPPER RESPIRATORY INFECTION, UNSPECIFIED: ICD-10-CM

## 2019-12-31 PROCEDURE — 83036 HEMOGLOBIN GLYCOSYLATED A1C: CPT | Mod: QW

## 2019-12-31 PROCEDURE — G0444 DEPRESSION SCREEN ANNUAL: CPT | Mod: 59

## 2019-12-31 PROCEDURE — 93000 ELECTROCARDIOGRAM COMPLETE: CPT | Mod: 59

## 2019-12-31 PROCEDURE — G0439: CPT

## 2019-12-31 PROCEDURE — 82962 GLUCOSE BLOOD TEST: CPT

## 2019-12-31 PROCEDURE — 99497 ADVNCD CARE PLAN 30 MIN: CPT | Mod: 33

## 2020-01-01 LAB
ALBUMIN SERPL ELPH-MCNC: 4.1 G/DL
ALP BLD-CCNC: 53 U/L
ALT SERPL-CCNC: 19 U/L
ANION GAP SERPL CALC-SCNC: 12 MMOL/L
AST SERPL-CCNC: 19 U/L
BASOPHILS # BLD AUTO: 0.04 K/UL
BASOPHILS NFR BLD AUTO: 0.7 %
BILIRUB SERPL-MCNC: 0.5 MG/DL
BUN SERPL-MCNC: 17 MG/DL
CALCIUM SERPL-MCNC: 8.8 MG/DL
CHLORIDE SERPL-SCNC: 103 MMOL/L
CHOLEST SERPL-MCNC: 178 MG/DL
CHOLEST/HDLC SERPL: 4.2 RATIO
CO2 SERPL-SCNC: 25 MMOL/L
CREAT SERPL-MCNC: 1.1 MG/DL
CREAT SPEC-SCNC: 72 MG/DL
CREAT/PROT UR: 0.1 RATIO
EOSINOPHIL # BLD AUTO: 0.28 K/UL
EOSINOPHIL NFR BLD AUTO: 4.9 %
GLUCOSE BLDC GLUCOMTR-MCNC: 127
GLUCOSE SERPL-MCNC: 138 MG/DL
HBA1C MFR BLD HPLC: 7.4
HCT VFR BLD CALC: 44.5 %
HCV AB SER QL: NONREACTIVE
HCV S/CO RATIO: 0.18 S/CO
HDLC SERPL-MCNC: 42 MG/DL
HGB BLD-MCNC: 14.2 G/DL
HIV1+2 AB SPEC QL IA.RAPID: NONREACTIVE
IMM GRANULOCYTES NFR BLD AUTO: 0.3 %
LDLC SERPL CALC-MCNC: 107 MG/DL
LYMPHOCYTES # BLD AUTO: 2.24 K/UL
LYMPHOCYTES NFR BLD AUTO: 39 %
MAGNESIUM SERPL-MCNC: 1.8 MG/DL
MAN DIFF?: NORMAL
MCHC RBC-ENTMCNC: 28.2 PG
MCHC RBC-ENTMCNC: 31.9 GM/DL
MCV RBC AUTO: 88.3 FL
MONOCYTES # BLD AUTO: 0.39 K/UL
MONOCYTES NFR BLD AUTO: 6.8 %
NEUTROPHILS # BLD AUTO: 2.78 K/UL
NEUTROPHILS NFR BLD AUTO: 48.3 %
PLATELET # BLD AUTO: 205 K/UL
POTASSIUM SERPL-SCNC: 4.6 MMOL/L
PROT SERPL-MCNC: 6.6 G/DL
PROT UR-MCNC: 7 MG/DL
RBC # BLD: 5.04 M/UL
RBC # FLD: 12.5 %
SODIUM SERPL-SCNC: 140 MMOL/L
TRIGL SERPL-MCNC: 144 MG/DL
TSH SERPL-ACNC: 2.1 UIU/ML
WBC # FLD AUTO: 5.75 K/UL

## 2020-01-01 NOTE — HEALTH RISK ASSESSMENT
[No falls in past year] : Patient reported no falls in the past year [No] : No [No Retinopathy] : No retinopathy [HIV Test offered] : HIV Test offered [Patient reported colonoscopy was normal] : Patient reported colonoscopy was normal [Hepatitis C test offered] : Hepatitis C test offered [Retired] : retired [With Family] : lives with family [Graduate School] : graduate school [] :  [Fully functional (bathing, dressing, toileting, transferring, walking, feeding)] : Fully functional (bathing, dressing, toileting, transferring, walking, feeding) [Fully functional (using the telephone, shopping, preparing meals, housekeeping, doing laundry, using] : Fully functional and needs no help or supervision to perform IADLs (using the telephone, shopping, preparing meals, housekeeping, doing laundry, using transportation, managing medications and managing finances) [Smoke Detector] : smoke detector [With Patient/Caregiver] : With Patient/Caregiver [Relationship: ___] : Relationship: [unfilled] [Designated Healthcare Proxy] : Designated healthcare proxy [Never (0 pts)] : Never (0 points) [0] : 1) Little interest or pleasure doing things: Not at all (0) [Name: ___] : Health Care Proxy's Name: [unfilled]  [] : No [Audit-CScore] : 0 [de-identified] : [employer] - exercises there daily but not in pool [de-identified] : varied - few small meals [[id number]] : 0 [EyeExamDate] : 11/19 [Change in mental status noted] : No change in mental status noted [Reports changes in hearing] : Reports no changes in hearing [Reports changes in vision] : Reports no changes in vision [ColonoscopyDate] : 07/16 [FreeTextEntry2] : [place] govt -  [AdvancecareDate] : 12/19

## 2020-01-01 NOTE — HISTORY OF PRESENT ILLNESS
[de-identified] : History of hypertension, diabetes, hyperlipidemia, CVA followed by Dr. Polanco, spinal stenosis followed by ortho.\par Last visit, was complaining of nocturnal leg cramps for which calf stretches and sugar-free tonic water advised.  he reports the tonic water has helped considerably and has been using it intermittently. \par Also noted on problem list to have history of thyroid nodule - he reports today has had sono's in the past - and all ok by his report. \par Still c/o some neuropathic pain right leg intermittently - was advised by neuro to use gabapentin however he reports it causes too much dizziness - does not feel safe taking it. \par todays \par did not take BP meds today\par c/o URI sx with cough. no fever, no wheeze. everyone at home sick.\par \par

## 2020-01-01 NOTE — REVIEW OF SYSTEMS
[Negative] : Heme/Lymph [Nasal Discharge] : nasal discharge [Postnasal Drip] : postnasal drip [FreeTextEntry4] : nasal turbinates inflamed and erythematous. mouth sore [FreeTextEntry9] : see hpi [de-identified] : bilateral foot paraesthesias, intermittent right sided sciatica

## 2020-01-01 NOTE — DISCUSSION/SUMMARY
[Subsequent Annual Wellness] : Subsequent Annual Wellness Visit [EKG] : EKG recommended [Preventive Exam & Counseling Completed] : with preventive exam as well as age and risk appropriate counseling completed [Healthy Diet] : counseling was given on maintaining a healthy diet [Blood Glucose] : due for blood glucose [Screening Not Indicated] : screening not indicated [Screening Current] : screening is current [NATANAEL] : due for NATANAEL [Influenza UTD This Year] : influenza vaccine is up to date this year [Influenza Recommended Annually] : influenza vaccination is recommended annually [Paperwork & Instructions Given] : paperwork and instructions were given to the patient [Lifetime Vaccine Completed] : the lifetime pneumococcal vaccine has been completed [Encouraged to F/U to Discuss Questions/Decisions] : ~he/she~ was encouraged to follow-up with me to discuss ~his/her~ questions and/or decisions [Follow-Up in ___] : follow-up visit needed in [unfilled]

## 2020-01-01 NOTE — PHYSICAL EXAM
[No Acute Distress] : no acute distress [PERRL] : pupils equal round and reactive to light [Normal Sclera/Conjunctiva] : normal sclera/conjunctiva [Well-Appearing] : well-appearing [Normal Outer Ear/Nose] : the outer ears and nose were normal in appearance [EOMI] : extraocular movements intact [No Lymphadenopathy] : no lymphadenopathy [Supple] : supple [Normal Oropharynx] : the oropharynx was normal [Normal Rate] : normal rate  [No Respiratory Distress] : no respiratory distress  [Clear to Auscultation] : lungs were clear to auscultation bilaterally [Regular Rhythm] : with a regular rhythm [Normal S1, S2] : normal S1 and S2 [Pedal Pulses Present] : the pedal pulses are present [Non Tender] : non-tender [Soft] : abdomen soft [No Edema] : there was no peripheral edema [Normal Bowel Sounds] : normal bowel sounds [Normal Anterior Cervical Nodes] : no anterior cervical lymphadenopathy [Normal Posterior Cervical Nodes] : no posterior cervical lymphadenopathy [No CVA Tenderness] : no CVA  tenderness [No Spinal Tenderness] : no spinal tenderness [Grossly Normal Strength/Tone] : grossly normal strength/tone [No Joint Swelling] : no joint swelling [No Rash] : no rash [No Focal Deficits] : no focal deficits [Normal] : normal texture and mobility [Coordination Grossly Intact] : coordination grossly intact [Normal Insight/Judgement] : insight and judgment were intact [Normal Affect] : the affect was normal [Normal Gait] : normal gait [Comprehensive Foot Exam Normal] : Right and left foot were examined and both feet are normal. No ulcers in either foot. Toes are normal and with full ROM.  Normal tactile sensation with monofilament testing throughout both feet [de-identified] : ~ 0.5 cm aphthous ulcer left bottom inner lip

## 2020-04-23 ENCOUNTER — APPOINTMENT (OUTPATIENT)
Dept: INTERNAL MEDICINE | Facility: CLINIC | Age: 70
End: 2020-04-23

## 2020-10-27 ENCOUNTER — APPOINTMENT (OUTPATIENT)
Dept: INTERNAL MEDICINE | Facility: CLINIC | Age: 70
End: 2020-10-27

## 2020-12-23 PROBLEM — J06.9 VIRAL URI WITH COUGH: Status: RESOLVED | Noted: 2019-12-31 | Resolved: 2020-12-23

## 2021-02-05 ENCOUNTER — APPOINTMENT (OUTPATIENT)
Dept: INTERNAL MEDICINE | Facility: CLINIC | Age: 71
End: 2021-02-05

## 2021-03-07 ENCOUNTER — RX RENEWAL (OUTPATIENT)
Age: 71
End: 2021-03-07

## 2021-03-08 ENCOUNTER — RX RENEWAL (OUTPATIENT)
Age: 71
End: 2021-03-08

## 2021-04-01 ENCOUNTER — NON-APPOINTMENT (OUTPATIENT)
Age: 71
End: 2021-04-01

## 2021-04-05 ENCOUNTER — APPOINTMENT (OUTPATIENT)
Dept: DISASTER EMERGENCY | Facility: OTHER | Age: 71
End: 2021-04-05
Payer: MEDICARE

## 2021-04-05 PROCEDURE — 0001A: CPT

## 2021-04-06 ENCOUNTER — APPOINTMENT (OUTPATIENT)
Dept: INTERNAL MEDICINE | Facility: CLINIC | Age: 71
End: 2021-04-06
Payer: MEDICARE

## 2021-04-06 LAB
ALBUMIN SERPL ELPH-MCNC: 4.6 G/DL
ALP BLD-CCNC: 65 U/L
ALT SERPL-CCNC: 24 U/L
ANION GAP SERPL CALC-SCNC: 9 MMOL/L
AST SERPL-CCNC: 20 U/L
BASOPHILS # BLD AUTO: 0.05 K/UL
BASOPHILS NFR BLD AUTO: 0.6 %
BILIRUB SERPL-MCNC: 0.3 MG/DL
BUN SERPL-MCNC: 17 MG/DL
CALCIUM SERPL-MCNC: 9.5 MG/DL
CHLORIDE SERPL-SCNC: 103 MMOL/L
CHOLEST SERPL-MCNC: 138 MG/DL
CO2 SERPL-SCNC: 27 MMOL/L
CREAT SERPL-MCNC: 1.11 MG/DL
EOSINOPHIL # BLD AUTO: 0.23 K/UL
EOSINOPHIL NFR BLD AUTO: 2.8 %
ESTIMATED AVERAGE GLUCOSE: 151 MG/DL
GLUCOSE SERPL-MCNC: 131 MG/DL
HBA1C MFR BLD HPLC: 6.9 %
HCT VFR BLD CALC: 43.8 %
HDLC SERPL-MCNC: 45 MG/DL
HGB BLD-MCNC: 13.7 G/DL
IMM GRANULOCYTES NFR BLD AUTO: 0.2 %
LDLC SERPL CALC-MCNC: 67 MG/DL
LYMPHOCYTES # BLD AUTO: 2.63 K/UL
LYMPHOCYTES NFR BLD AUTO: 31.9 %
MAN DIFF?: NORMAL
MCHC RBC-ENTMCNC: 28.1 PG
MCHC RBC-ENTMCNC: 31.3 GM/DL
MCV RBC AUTO: 89.9 FL
MONOCYTES # BLD AUTO: 0.53 K/UL
MONOCYTES NFR BLD AUTO: 6.4 %
NEUTROPHILS # BLD AUTO: 4.78 K/UL
NEUTROPHILS NFR BLD AUTO: 58.1 %
NONHDLC SERPL-MCNC: 93 MG/DL
PLATELET # BLD AUTO: 272 K/UL
POTASSIUM SERPL-SCNC: 5.1 MMOL/L
PROT SERPL-MCNC: 7.1 G/DL
PSA SERPL-MCNC: 4.66 NG/ML
RBC # BLD: 4.87 M/UL
RBC # FLD: 13 %
SODIUM SERPL-SCNC: 139 MMOL/L
TRIGL SERPL-MCNC: 131 MG/DL
TSH SERPL-ACNC: 2.97 UIU/ML
WBC # FLD AUTO: 8.24 K/UL

## 2021-04-06 PROCEDURE — 99443: CPT | Mod: 95

## 2021-04-18 ENCOUNTER — RX RENEWAL (OUTPATIENT)
Age: 71
End: 2021-04-18

## 2021-04-26 ENCOUNTER — APPOINTMENT (OUTPATIENT)
Dept: DISASTER EMERGENCY | Facility: OTHER | Age: 71
End: 2021-04-26
Payer: MEDICARE

## 2021-04-26 PROCEDURE — 0002A: CPT

## 2021-04-29 ENCOUNTER — TRANSCRIPTION ENCOUNTER (OUTPATIENT)
Age: 71
End: 2021-04-29

## 2021-04-29 ENCOUNTER — APPOINTMENT (OUTPATIENT)
Dept: UROLOGY | Facility: CLINIC | Age: 71
End: 2021-04-29
Payer: MEDICARE

## 2021-04-29 VITALS
DIASTOLIC BLOOD PRESSURE: 80 MMHG | SYSTOLIC BLOOD PRESSURE: 164 MMHG | WEIGHT: 150 LBS | TEMPERATURE: 97 F | HEART RATE: 79 BPM | BODY MASS INDEX: 24.11 KG/M2 | RESPIRATION RATE: 12 BRPM | HEIGHT: 66 IN

## 2021-04-29 PROCEDURE — 99204 OFFICE O/P NEW MOD 45 MIN: CPT

## 2021-04-30 LAB
ANION GAP SERPL CALC-SCNC: 11 MMOL/L
BUN SERPL-MCNC: 17 MG/DL
CALCIUM SERPL-MCNC: 9.4 MG/DL
CHLORIDE SERPL-SCNC: 101 MMOL/L
CO2 SERPL-SCNC: 25 MMOL/L
CREAT SERPL-MCNC: 1.13 MG/DL
ESTRADIOL SERPL-MCNC: 26 PG/ML
GLUCOSE SERPL-MCNC: 214 MG/DL
LH SERPL-ACNC: 14.2 IU/L
POTASSIUM SERPL-SCNC: 4.8 MMOL/L
PROLACTIN SERPL-MCNC: 8.6 NG/ML
PSA FREE FLD-MCNC: 18 %
PSA FREE SERPL-MCNC: 1.12 NG/ML
PSA SERPL-MCNC: 6.13 NG/ML
SODIUM SERPL-SCNC: 138 MMOL/L

## 2021-04-30 NOTE — LETTER BODY
[FreeTextEntry1] : Betty Madison MD\par [address] [facility],\[place] [address]\par [phone]\par \par Dear Dr. Lepe,\par \par Reason for Visit: BPH. Erectile dysfunction. Elevated PSA. Balanitis.\par \par This is a 70 year-old gentleman with history of stroke, mild  left-sided hemiparesis, hypertension, and diabetes, presenting with symptoms of BPH. Patient is here today for evaluation. Patient reports he has weak uroflow, frequency, and urinary urgency. He denies any hematuria or urinary incontinence. His symptoms are aggravated by hydration. He denies any alleviating factors. He has not tried any medical therapy previously. He denies any pain. Patient also reports erectile dysfunction. He has early detumescence. He denies any morning erections. He has normal libido of sex drive. Patient has difficulty maintaining hygiene. He is able to retract his foreskin. He has balanitis. By his report, his PSA is 4.6. All other review of systems are negative. He has no cancer in his family medical history. He has no previous surgical history. Past medical history, family history and social history were inquired and were noncontributory to current condition. The patient does not use tobacco or drink alcohol. Medications and allergies were reviewed. He has no known allergies to medication. \par \par On examination, the patient is a healthy-appearing gentleman in no acute distress. He is alert and oriented follows commands. He has normal mood and affect. He is normocephalic. Neck is supple. Oral no thrush Respirations are unlabored. His abdomen is soft and nontender. Bladder is nonpalpable. No CVA tenderness. Neurologically he is grossly intact. He has mild left sided weakness. No peripheral edema. Skin without gross abnormality. He has normal male external genitalia. Normal meatus. Bilateral testes are descended intrascrotally and normal to palpation. On rectal examination, there is normal sphincter tone. The prostate is clinically benign without focal induration or nodularity. He has mild balanitis. His foreskin is retractable.\par \par ASSESSMENT: BPH. Erectile dysfunction. Elevated PSA. Balanitis.\par \par I counseled the patient. I discussed that his poorly managed diabetes is his most concerning issue. I discussed his uncontrolled glycosuria can lead to osmotic diuresis causing polyuria. I encouraged proper glycemic blood management and dietary restriction of carbohydrates. In terms of his BPH, I counseled the patient on the various etiology of his symptoms. I discussed the natural history of BPH and the treatment options available. I discussed the options of conservative management with fluid in dietary restrictions, herbal therapy, medical therapy, and minimally invasive procedures. Risk and benefits were discussed. I answered his questions. I recommended he begin a trial of Flomax. I discussed the potential side effects of the medication. I counseled the patient on its use and side effects. If the patient develops any side effects, the patient will discontinue the medication and contact me. He will obtain urinalysis today. In terms of his erectile dysfunction, I counseled the patient. I discussed the various etiologies of erectile dysfunction. I recommended that he obtained a male hormone panel with testosterone, free testosterone, estradiol, prolactin, and LH level. In terms of his elevated PSA, I recommended he repeat PSA and BMP today to ensure stability. In terms of his balanitis, I recommended the patient try applying Clotrimazole-Betamethasone cream. Risks and alternatives were discussed. I answered the patient questions. The patient will follow-up in 1 month and will contact me with any questions or concerns. Thank you for the opportunity to participate in the care of Mr. ROSALES. I will keep you updated on his progress.\par \par Plan: Trial of Flomax and Clotrimazole-Betamethasone cream. Male hormone panel. BMP. PSA. Urinalysis. Improve glycemic control. Follow-up in 1 month.\par \par PSA 6.13 - need prostate MRI

## 2021-04-30 NOTE — ADDENDUM
[FreeTextEntry1] : Entered by Odilon Goss, acting as scribe for Dr. Deondre Ramos.\par \par The documentation recorded by the scribe accurately reflects the service I personally performed and the decisions made by me.\par

## 2021-05-02 ENCOUNTER — TRANSCRIPTION ENCOUNTER (OUTPATIENT)
Age: 71
End: 2021-05-02

## 2021-05-06 LAB
TESTOST BND SERPL-MCNC: 4.4 PG/ML
TESTOST SERPL-MCNC: 192 NG/DL

## 2021-05-14 ENCOUNTER — RX RENEWAL (OUTPATIENT)
Age: 71
End: 2021-05-14

## 2021-05-28 ENCOUNTER — APPOINTMENT (OUTPATIENT)
Dept: MRI IMAGING | Facility: IMAGING CENTER | Age: 71
End: 2021-05-28

## 2021-06-02 ENCOUNTER — TRANSCRIPTION ENCOUNTER (OUTPATIENT)
Age: 71
End: 2021-06-02

## 2021-06-04 ENCOUNTER — TRANSCRIPTION ENCOUNTER (OUTPATIENT)
Age: 71
End: 2021-06-04

## 2021-06-07 ENCOUNTER — RESULT REVIEW (OUTPATIENT)
Age: 71
End: 2021-06-07

## 2021-06-07 ENCOUNTER — RESULT CHARGE (OUTPATIENT)
Age: 71
End: 2021-06-07

## 2021-06-07 ENCOUNTER — APPOINTMENT (OUTPATIENT)
Dept: MRI IMAGING | Facility: IMAGING CENTER | Age: 71
End: 2021-06-07
Payer: MEDICARE

## 2021-06-07 ENCOUNTER — OUTPATIENT (OUTPATIENT)
Dept: OUTPATIENT SERVICES | Facility: HOSPITAL | Age: 71
LOS: 1 days | End: 2021-06-07
Payer: MEDICARE

## 2021-06-07 DIAGNOSIS — R97.20 ELEVATED PROSTATE SPECIFIC ANTIGEN [PSA]: ICD-10-CM

## 2021-06-07 PROCEDURE — A9585: CPT

## 2021-06-07 PROCEDURE — 72197 MRI PELVIS W/O & W/DYE: CPT

## 2021-06-07 PROCEDURE — G1004: CPT

## 2021-06-07 PROCEDURE — 76498 UNLISTED MR PROCEDURE: CPT

## 2021-06-07 PROCEDURE — 72197 MRI PELVIS W/O & W/DYE: CPT | Mod: 26,ME

## 2021-06-07 PROCEDURE — 76498 UNLISTED MR PROCEDURE: CPT | Mod: 26,MH

## 2021-06-08 ENCOUNTER — APPOINTMENT (OUTPATIENT)
Dept: INTERNAL MEDICINE | Facility: CLINIC | Age: 71
End: 2021-06-08
Payer: MEDICARE

## 2021-06-08 VITALS
OXYGEN SATURATION: 98 % | BODY MASS INDEX: 24.75 KG/M2 | DIASTOLIC BLOOD PRESSURE: 70 MMHG | HEIGHT: 66 IN | WEIGHT: 154 LBS | SYSTOLIC BLOOD PRESSURE: 110 MMHG | HEART RATE: 68 BPM

## 2021-06-08 DIAGNOSIS — G81.90 HEMIPLEGIA, UNSPECIFIED AFFECTING UNSPECIFIED SIDE: ICD-10-CM

## 2021-06-08 PROCEDURE — G0442 ANNUAL ALCOHOL SCREEN 15 MIN: CPT | Mod: 59

## 2021-06-08 PROCEDURE — G0444 DEPRESSION SCREEN ANNUAL: CPT | Mod: 59

## 2021-06-08 PROCEDURE — G0439: CPT

## 2021-06-08 PROCEDURE — 99497 ADVNCD CARE PLAN 30 MIN: CPT

## 2021-06-13 RX ORDER — CYCLOBENZAPRINE HYDROCHLORIDE 10 MG/1
10 TABLET, FILM COATED ORAL
Qty: 30 | Refills: 0 | Status: DISCONTINUED | COMMUNITY
Start: 2019-05-08 | End: 2021-06-13

## 2021-06-13 RX ORDER — ENEMA 19; 7 G/133ML; G/133ML
7-19 ENEMA RECTAL
Qty: 2 | Refills: 0 | Status: DISCONTINUED | COMMUNITY
Start: 2021-04-30 | End: 2021-06-13

## 2021-06-13 RX ORDER — BENZONATATE 100 MG/1
100 CAPSULE ORAL 3 TIMES DAILY
Qty: 30 | Refills: 0 | Status: DISCONTINUED | COMMUNITY
Start: 2019-12-31 | End: 2021-06-13

## 2021-06-13 NOTE — ASSESSMENT
[FreeTextEntry1] : Annual alcohol screen completed this visit. Currently does not drink ETOH.  Healthy drinking guidelines shared with patient (Male no more than 4 SSD on any day, no more than 14 SSD per week; Female and male overage 65 no more than 3 SSD on any day, no more than 7 per week). Positive reinforcement provided given patient currently within healthy guidelines. \par \par Annual depression screen completed this visit.  PHQ 9 completed and reviewed.  Screening not suggestive of diagnosis of MDD. \par \par Discussed advanced directives, Health Care Proxy, and goals of care during visit.

## 2021-06-13 NOTE — DISCUSSION/SUMMARY
[EKG] : EKG recommended [Healthy Diet] : counseling was given on maintaining a healthy diet [Improve Physical Activity] : counseling was given on ways to improve physical activity [Screening Current] : screening is current [Regular Weightbearing Exercise] : counseling was given on the importance of regular weightbearing exercise [Screening Not Indicated] : screening not indicated [Ophthalmologist Referral] : ophthalmologist referral [Risks and Benefits Discussed] : the risks and benefits of screening were discussed [Lifetime Vaccine Completed] : the lifetime pneumococcal vaccine has been completed [Tdap Vaccine UTD] : Tdap vaccination up to date [Complete and Up to Date] : complete and up to date [Paperwork & Instructions Given] : paperwork and instructions were given to the patient [Encouraged to F/U to Discuss Questions/Decisions] : ~he/she~ was encouraged to follow-up with me to discuss ~his/her~ questions and/or decisions [Follow-Up in ___] : follow-up visit needed in [unfilled] [de-identified] : due in July

## 2021-06-13 NOTE — HISTORY OF PRESENT ILLNESS
[de-identified] : Has not seen neuro in about 2 years\par Started probiotics one month ago - hel;ping with GERD sx\par Using Phytage Nerve Control 911 for one month - is helping parastesia in feet. also less pain i back \par would like reivew of meds - can any be stopeed\par \par s/p both COVId shots - Pfizer

## 2021-06-13 NOTE — HEALTH RISK ASSESSMENT
[No] : No [No falls in past year] : Patient reported no falls in the past year [0] : 2) Feeling down, depressed, or hopeless: Not at all (0) [Patient reported colonoscopy was normal] : Patient reported colonoscopy was normal [HIV Test offered] : HIV Test offered [Hepatitis C test offered] : Hepatitis C test offered [None] : None [Retired] : retired [] :  [Feels Safe at Home] : Feels safe at home [Fully functional (bathing, dressing, toileting, transferring, walking, feeding)] : Fully functional (bathing, dressing, toileting, transferring, walking, feeding) [Fully functional (using the telephone, shopping, preparing meals, housekeeping, doing laundry, using] : Fully functional and needs no help or supervision to perform IADLs (using the telephone, shopping, preparing meals, housekeeping, doing laundry, using transportation, managing medications and managing finances) [Smoke Detector] : smoke detector [Carbon Monoxide Detector] : carbon monoxide detector [Seat Belt] :  uses seat belt [With Family] : lives with family [With Patient/Caregiver] : With Patient/Caregiver [Designated Healthcare Proxy] : Designated healthcare proxy [Name: ___] : Health Care Proxy's Name: [unfilled]  [Relationship: ___] : Relationship: [unfilled] [] : No [de-identified] : Urology, Ortho [Audit-CScore] : 0 [de-identified] : rare exercise - gyn closed.  [de-identified] : varied [de-identified] : when feels weak, takes cane with him [[id number]] : 0 [Change in mental status noted] : No change in mental status noted [Reports changes in hearing] : Reports no changes in hearing [Reports changes in vision] : Reports no changes in vision [ColonoscopyDate] : 07/16 [AdvancecareDate] : 06/21

## 2021-06-13 NOTE — PHYSICAL EXAM
[No Acute Distress] : no acute distress [Well-Appearing] : well-appearing [Normal Sclera/Conjunctiva] : normal sclera/conjunctiva [PERRL] : pupils equal round and reactive to light [EOMI] : extraocular movements intact [Normal Outer Ear/Nose] : the outer ears and nose were normal in appearance [No Lymphadenopathy] : no lymphadenopathy [Supple] : supple [No Respiratory Distress] : no respiratory distress  [Clear to Auscultation] : lungs were clear to auscultation bilaterally [Normal Rate] : normal rate  [Regular Rhythm] : with a regular rhythm [Normal S1, S2] : normal S1 and S2 [Pedal Pulses Present] : the pedal pulses are present [No Edema] : there was no peripheral edema [Soft] : abdomen soft [Non Tender] : non-tender [Normal Bowel Sounds] : normal bowel sounds [Normal Posterior Cervical Nodes] : no posterior cervical lymphadenopathy [Normal Anterior Cervical Nodes] : no anterior cervical lymphadenopathy [No CVA Tenderness] : no CVA  tenderness [No Spinal Tenderness] : no spinal tenderness [No Joint Swelling] : no joint swelling [Grossly Normal Strength/Tone] : grossly normal strength/tone [No Rash] : no rash [Normal] : normal texture and mobility [No Focal Deficits] : no focal deficits [Normal Gait] : normal gait [Normal Affect] : the affect was normal [Comprehensive Foot Exam Normal] : Right and left foot were examined and both feet are normal. No ulcers in either foot. Toes are normal and with full ROM.  Normal tactile sensation with monofilament testing throughout both feet

## 2021-06-17 ENCOUNTER — APPOINTMENT (OUTPATIENT)
Dept: UROLOGY | Facility: CLINIC | Age: 71
End: 2021-06-17
Payer: MEDICARE

## 2021-06-17 PROCEDURE — 99214 OFFICE O/P EST MOD 30 MIN: CPT

## 2021-06-17 NOTE — LETTER BODY
[FreeTextEntry1] : Betty Madison MD\par [address] [facility],\[place] [address]\par [phone]\par \par Dear Dr. Lepe,\par \par Reason for Visit: BPH. Erectile dysfunction. Elevated PSA. Balanitis.\par \par This is a 70 year-old gentleman with history of stroke, mild left-sided hemiparesis, hypertension, and diabetes, presenting with BPH, balanitis, and erectile dysfunction.. The patient returns today for follow up. Since he was last seen, the patient underwent an unremarkable prostate MRI which demonstrated PI-RADS 2. The patient is currently on Flomax QD.. He complains of progressive urinary symptoms despite being on medical therapy. He denies any pain. His symptoms of balanitis improved after applying Clotrimazole-Betamethasone cream. All other review of systems are negative. His social and family history remain unchanged. Past medical history, family history and social history were inquired and were noncontributory to current condition.. Medications and allergies were reviewed. He has no known allergies to medication. \par \par On examination, the patient is a healthy-appearing gentleman in no acute distress. He is alert and oriented follows commands. He has normal mood and affect. He is normocephalic. Neck is supple. Oral no thrush Respirations are unlabored. His abdomen is soft and nontender. Bladder is nonpalpable. No CVA tenderness. Neurologically he is grossly intact. He has mild left sided weakness. No peripheral edema. Skin without gross abnormality. He has normal male external genitalia. Normal meatus. Bilateral testes are descended intrascrotally and normal to palpation. On rectal examination, there is normal sphincter tone. The prostate is clinically benign without focal induration or nodularity. He has mild balanitis. His foreskin is retractable.\par \par His previous PSA was 6.13 which is elevated. His male hormone panel was normal. \par \par His prostate MRI demonstrated and enlarged prostate gland of 47 cc with PI-RADS 2 \par \par ASSESSMENT: BPH. Erectile dysfunction. Elevated PSA. Balanitis.\par \par I counseled the patient. In terms of his BPH, the patient still complains of progressive urinary symptoms. I recommended the patient increase Flomax to BID. I renewed the patient's prescription for Flomax today. I encouraged the patient to continue medications regularly as directed. in terms of his elevated PSA, his pervious PSA was 6.13 which is elevated. His prostate MRI showed PI-RADS 2. I reassured the patient the risk of occult malignancy is low. I recommended the patient repeat PSA and BMP to ensure stability. In terms of his balanitis, the patient had improved symptoms after applying Clotrimazole-Betamethasone cream. In terms of his erectile dysfunction, his male hormone labs were all normal. Risks and alternatives were discussed. I answered the patient questions. The patient will follow-up in 3 months and will contact me with any questions or concerns. Thank you for the opportunity to participate in the care of Mr. ROSALES. I will keep you updated on his progress. \par \par Plan: Increase Flomax to BID. Follow up in 3 months

## 2021-06-17 NOTE — ADDENDUM
[FreeTextEntry1] : Entered by Paloma Goldman, acting as scribe for Dr. Deondre Ramos.\par \par The documentation recorded by the scribe accurately reflects the service I personally performed and the decisions made by me.

## 2021-06-17 NOTE — HISTORY OF PRESENT ILLNESS
[FreeTextEntry1] : Please refer to URO Consult note \par \par elvated psa bph \par mri shows pirad 2\par progressive sym \par increase flomax BID \par fu in 3 months

## 2021-07-25 ENCOUNTER — RX RENEWAL (OUTPATIENT)
Age: 71
End: 2021-07-25

## 2021-07-25 LAB
CREAT SPEC-SCNC: 128 MG/DL
HCV AB SER QL: NONREACTIVE
HCV S/CO RATIO: 0.12 S/CO
HIV1+2 AB SPEC QL IA.RAPID: NONREACTIVE
MAGNESIUM SERPL-MCNC: 1.7 MG/DL
MICROALBUMIN 24H UR DL<=1MG/L-MCNC: 1.2 MG/DL
MICROALBUMIN/CREAT 24H UR-RTO: 10 MG/G

## 2021-07-26 ENCOUNTER — TRANSCRIPTION ENCOUNTER (OUTPATIENT)
Age: 71
End: 2021-07-26

## 2021-07-26 ENCOUNTER — NON-APPOINTMENT (OUTPATIENT)
Age: 71
End: 2021-07-26

## 2021-07-27 ENCOUNTER — APPOINTMENT (OUTPATIENT)
Dept: INTERNAL MEDICINE | Facility: CLINIC | Age: 71
End: 2021-07-27
Payer: MEDICARE

## 2021-07-27 VITALS
HEART RATE: 82 BPM | HEIGHT: 66 IN | WEIGHT: 156 LBS | DIASTOLIC BLOOD PRESSURE: 90 MMHG | SYSTOLIC BLOOD PRESSURE: 164 MMHG | BODY MASS INDEX: 25.07 KG/M2 | OXYGEN SATURATION: 98 %

## 2021-07-27 PROCEDURE — 82962 GLUCOSE BLOOD TEST: CPT

## 2021-07-27 PROCEDURE — 83036 HEMOGLOBIN GLYCOSYLATED A1C: CPT | Mod: QW

## 2021-07-27 PROCEDURE — 99214 OFFICE O/P EST MOD 30 MIN: CPT | Mod: 25

## 2021-07-28 ENCOUNTER — NON-APPOINTMENT (OUTPATIENT)
Age: 71
End: 2021-07-28

## 2021-07-28 ENCOUNTER — APPOINTMENT (OUTPATIENT)
Dept: ORTHOPEDIC SURGERY | Facility: CLINIC | Age: 71
End: 2021-07-28
Payer: MEDICARE

## 2021-07-28 VITALS
WEIGHT: 156 LBS | BODY MASS INDEX: 25.07 KG/M2 | HEIGHT: 66 IN | DIASTOLIC BLOOD PRESSURE: 91 MMHG | SYSTOLIC BLOOD PRESSURE: 203 MMHG | HEART RATE: 71 BPM

## 2021-07-28 PROCEDURE — 72100 X-RAY EXAM L-S SPINE 2/3 VWS: CPT

## 2021-07-28 PROCEDURE — 99214 OFFICE O/P EST MOD 30 MIN: CPT

## 2021-07-28 NOTE — DISCUSSION/SUMMARY
[de-identified] : We discussed further treatment options.  He has had extensive nonsurgical treatment.  Prior imaging does document significant phimosis.  I recommended an updated lumbar spine MRI.  We briefly discussed the role of surgery and a possible lumbar decompression for treatment of his symptoms.  He will follow-up after his MRI.

## 2021-07-28 NOTE — HISTORY OF PRESENT ILLNESS
[de-identified] : Mr. DEBBY ROSALES  is a 71 year old male who presents with a chronic history of low back pain and a few weeks of right buttock, lateral thigh, and lateral calf pain.  He has done physical therapy, acupuncture, and 2 injections with temporary relief.  Normal bowel and bladder control.   Denies any recent fevers, chills, sweats, weight loss, or infection.\par \par The patients past medical history, past surgical history, medications, allergies, and social history were reviewed by me today with the patient and documented accordingly.  In addition, the patient's family history, which is noncontributory to their visit, was also reviewed.\par

## 2021-08-02 LAB
ALBUMIN SERPL ELPH-MCNC: 4.3 G/DL
ALP BLD-CCNC: 62 U/L
ALT SERPL-CCNC: 48 U/L
ANION GAP SERPL CALC-SCNC: 14 MMOL/L
AST SERPL-CCNC: 30 U/L
BASOPHILS # BLD AUTO: 0.05 K/UL
BASOPHILS NFR BLD AUTO: 0.7 %
BILIRUB SERPL-MCNC: 0.3 MG/DL
BUN SERPL-MCNC: 20 MG/DL
CALCIUM SERPL-MCNC: 9.1 MG/DL
CHLORIDE SERPL-SCNC: 103 MMOL/L
CHOLEST SERPL-MCNC: 174 MG/DL
CO2 SERPL-SCNC: 22 MMOL/L
CREAT SERPL-MCNC: 1.23 MG/DL
EOSINOPHIL # BLD AUTO: 0.12 K/UL
EOSINOPHIL NFR BLD AUTO: 1.6 %
GLUCOSE BLDC GLUCOMTR-MCNC: 329
GLUCOSE SERPL-MCNC: 318 MG/DL
HBA1C MFR BLD HPLC: 8.3
HCT VFR BLD CALC: 41 %
HDLC SERPL-MCNC: 40 MG/DL
HGB BLD-MCNC: 13.3 G/DL
IMM GRANULOCYTES NFR BLD AUTO: 0.1 %
LDLC SERPL CALC-MCNC: 84 MG/DL
LYMPHOCYTES # BLD AUTO: 2.12 K/UL
LYMPHOCYTES NFR BLD AUTO: 28.3 %
MAN DIFF?: NORMAL
MCHC RBC-ENTMCNC: 28.1 PG
MCHC RBC-ENTMCNC: 32.4 GM/DL
MCV RBC AUTO: 86.7 FL
MONOCYTES # BLD AUTO: 0.52 K/UL
MONOCYTES NFR BLD AUTO: 6.9 %
NEUTROPHILS # BLD AUTO: 4.68 K/UL
NEUTROPHILS NFR BLD AUTO: 62.4 %
NONHDLC SERPL-MCNC: 134 MG/DL
PLATELET # BLD AUTO: 256 K/UL
POTASSIUM SERPL-SCNC: 4.5 MMOL/L
PROT SERPL-MCNC: 7.1 G/DL
RBC # BLD: 4.73 M/UL
RBC # FLD: 13.2 %
SODIUM SERPL-SCNC: 139 MMOL/L
TRIGL SERPL-MCNC: 250 MG/DL
TSH SERPL-ACNC: 1.28 UIU/ML
WBC # FLD AUTO: 7.5 K/UL

## 2021-08-02 NOTE — ASSESSMENT
[FreeTextEntry1] : 72 yo with h/o HTN (on lisinopril), DM (on metformin and glimepiride), HLD (on atorvastatin) and CVA (on plavix) - \par RE HTN: normally well controlled on lisinopril, generally runs in target range  - however he forgot to take his BP meds today and his BP is significantly elevated.  He will take meds as soon as he gets home.  Be sure to take at f/u so we can get an accurate reading.  \par RE DM: also well controlled on on metformin and glimepiride, last A1c 6.9, due for repeat today. overdue to see marcelo - he did go to see Dr Kelley 2 weeks ago - will fax here.  For the past 3 months, has really not been minding his diet the way he usually does. Is not exercising regularly anymore, eating more sweets and carbs, and has gained some wt.  A1c much higher - has gone from 6.9 3 months ago to 8.3 today.  No change in medication, is taking his meds as prescribed - has been eating more sweets and junk food, exercising less, and has gained 6 lbs. Options reviewed - he would like to really working on diet and exercise before any change in medication. He will work on meaningful changes - f/u in 3 months to recheck A1c. Consider changing glimepiride to GLP-1 such as Trulicity at f/u visit. \par RE HLD: on atorvastatin with no side effects, takes co-Q 10 as well. \par RE CVA: follows with Dr Libman, on Plavix, minimal residual deficits.  \par RE urology: recently seen by Dr Prescott - flomax increased to BID\par \par Back has really been bothering him -  is painful to exercise due to right leg - h/o sciatica.  has appt scheduled to see ortho, Dr Zhu, tomorrow.\par \par Only some of the bloodwork ordered for todays visit got done - unsure why.  He will have the rest drawn today. \par

## 2021-08-02 NOTE — PHYSICAL EXAM
[No Acute Distress] : no acute distress [Well-Appearing] : well-appearing [Normal Sclera/Conjunctiva] : normal sclera/conjunctiva [EOMI] : extraocular movements intact [Normal Outer Ear/Nose] : the outer ears and nose were normal in appearance [No Lymphadenopathy] : no lymphadenopathy [No Respiratory Distress] : no respiratory distress  [Clear to Auscultation] : lungs were clear to auscultation bilaterally [Normal Rate] : normal rate  [Regular Rhythm] : with a regular rhythm [Normal S1, S2] : normal S1 and S2 [No Edema] : there was no peripheral edema [Soft] : abdomen soft [Non Tender] : non-tender [Normal Anterior Cervical Nodes] : no anterior cervical lymphadenopathy [No CVA Tenderness] : no CVA  tenderness [No Spinal Tenderness] : no spinal tenderness [No Joint Swelling] : no joint swelling [Grossly Normal Strength/Tone] : grossly normal strength/tone [No Rash] : no rash [No Focal Deficits] : no focal deficits [Normal Affect] : the affect was normal [Comprehensive Foot Exam Normal] : Right and left foot were examined and both feet are normal. No ulcers in either foot. Toes are normal and with full ROM.  Normal tactile sensation with monofilament testing throughout both feet

## 2021-08-02 NOTE — HISTORY OF PRESENT ILLNESS
[FreeTextEntry1] : review of recent blood test results - appears not to have gotten all of them done\par f/u HTN, DM, HLD [de-identified] : 72 yo with h/o HTN (on lisinopril), DM (on metformin and glimepiride), HLD (on atorvastatin) and CVA (on plavix) - \par RE HTN: well controlled on lisinopril, generally runs in target range - needs renewal - he forgot to take his BP meds today. \par RE DM: also well controlled on on metformin and glimepiride, last A1c 6.9, due for repeat today. overdue to see ophtamannao - he did go to see Dr Kelley 2 weeks ago - will fax here\par RE HLD: on atorvastatin with no side effects, takes co-Q 10 as well. \par RE CVA: follows with Dr Libman, on Plavix, minimal residual deficits.  \par RE urology: recently seen by Dr Prescott - flomax increased to BID\par \par For the past 3 months, has really not been minding his diet the way he usually does. Is not exercising regularly anymore, eating more sweets and carbs, and has gained some st (~ 8 lbs since 8/19).  Back has really been bothering him - has appt scheduled to see ortho, Dr Zhu, tomorrow.

## 2021-08-12 ENCOUNTER — TRANSCRIPTION ENCOUNTER (OUTPATIENT)
Age: 71
End: 2021-08-12

## 2021-08-12 ENCOUNTER — APPOINTMENT (OUTPATIENT)
Dept: DERMATOLOGY | Facility: CLINIC | Age: 71
End: 2021-08-12
Payer: MEDICARE

## 2021-08-12 ENCOUNTER — RX RENEWAL (OUTPATIENT)
Age: 71
End: 2021-08-12

## 2021-08-12 VITALS — BODY MASS INDEX: 25.07 KG/M2 | HEIGHT: 66 IN | WEIGHT: 156 LBS

## 2021-08-12 DIAGNOSIS — B35.3 TINEA PEDIS: ICD-10-CM

## 2021-08-12 DIAGNOSIS — L21.9 SEBORRHEIC DERMATITIS, UNSPECIFIED: ICD-10-CM

## 2021-08-12 PROCEDURE — 99204 OFFICE O/P NEW MOD 45 MIN: CPT

## 2021-08-13 ENCOUNTER — OUTPATIENT (OUTPATIENT)
Dept: OUTPATIENT SERVICES | Facility: HOSPITAL | Age: 71
LOS: 1 days | End: 2021-08-13
Payer: MEDICARE

## 2021-08-13 ENCOUNTER — APPOINTMENT (OUTPATIENT)
Dept: MRI IMAGING | Facility: CLINIC | Age: 71
End: 2021-08-13
Payer: MEDICARE

## 2021-08-13 DIAGNOSIS — M51.36 OTHER INTERVERTEBRAL DISC DEGENERATION, LUMBAR REGION: ICD-10-CM

## 2021-08-13 PROCEDURE — 72148 MRI LUMBAR SPINE W/O DYE: CPT

## 2021-08-13 PROCEDURE — 72148 MRI LUMBAR SPINE W/O DYE: CPT | Mod: 26,MH

## 2021-08-18 ENCOUNTER — APPOINTMENT (OUTPATIENT)
Dept: ORTHOPEDIC SURGERY | Facility: CLINIC | Age: 71
End: 2021-08-18
Payer: MEDICARE

## 2021-08-18 VITALS — HEIGHT: 66 IN | WEIGHT: 156 LBS | BODY MASS INDEX: 25.07 KG/M2

## 2021-08-18 PROCEDURE — 99214 OFFICE O/P EST MOD 30 MIN: CPT

## 2021-08-18 NOTE — DISCUSSION/SUMMARY
[de-identified] : We discussed further treatment options.  He continues to have significant leg symptoms.  He has had extensive nonsurgical treatment medications, therapy, and injections.  We discussed the nature and purpose of an L3-S1 decompression.  He may also require a concomitant fusion at L5-S1 given his severe foraminal stenosis.  Risks of surgery were discussed including but not limited to bleeding, infection,  hardware related complications, graft migration, pseudoarthrosis, damage to nerves and vessels, continued or worsening pain and weakness, dural injury, CSF leak, need for further procedures, PE/DVT, GI, , pulmonary, and cardiac complications,  anesthetic risks, and death. The patient understands the risks and benefits and alternatives.  He will consider his options and let me know how he would like to proceed.

## 2021-08-18 NOTE — HISTORY OF PRESENT ILLNESS
[de-identified] : Mr. DEBBY ROSALES  is a 71 year old male who presents to the office for a follow-up visit.  He is here to review his MRI results. \par

## 2021-08-20 ENCOUNTER — TRANSCRIPTION ENCOUNTER (OUTPATIENT)
Age: 71
End: 2021-08-20

## 2021-08-23 ENCOUNTER — RESULT CHARGE (OUTPATIENT)
Age: 71
End: 2021-08-23

## 2021-08-24 ENCOUNTER — RESULT CHARGE (OUTPATIENT)
Age: 71
End: 2021-08-24

## 2021-08-24 ENCOUNTER — APPOINTMENT (OUTPATIENT)
Dept: INTERNAL MEDICINE | Facility: CLINIC | Age: 71
End: 2021-08-24
Payer: MEDICARE

## 2021-08-24 ENCOUNTER — OUTPATIENT (OUTPATIENT)
Dept: OUTPATIENT SERVICES | Facility: HOSPITAL | Age: 71
LOS: 1 days | End: 2021-08-24
Payer: MEDICARE

## 2021-08-24 VITALS — DIASTOLIC BLOOD PRESSURE: 85 MMHG | SYSTOLIC BLOOD PRESSURE: 150 MMHG

## 2021-08-24 VITALS
OXYGEN SATURATION: 98 % | HEART RATE: 95 BPM | DIASTOLIC BLOOD PRESSURE: 80 MMHG | TEMPERATURE: 98 F | SYSTOLIC BLOOD PRESSURE: 144 MMHG | WEIGHT: 151.9 LBS | RESPIRATION RATE: 16 BRPM | HEIGHT: 64 IN

## 2021-08-24 VITALS
HEIGHT: 66 IN | DIASTOLIC BLOOD PRESSURE: 98 MMHG | WEIGHT: 156 LBS | SYSTOLIC BLOOD PRESSURE: 160 MMHG | HEART RATE: 90 BPM | OXYGEN SATURATION: 98 % | BODY MASS INDEX: 25.07 KG/M2

## 2021-08-24 VITALS — SYSTOLIC BLOOD PRESSURE: 170 MMHG | DIASTOLIC BLOOD PRESSURE: 86 MMHG

## 2021-08-24 DIAGNOSIS — Z01.818 ENCOUNTER FOR OTHER PREPROCEDURAL EXAMINATION: ICD-10-CM

## 2021-08-24 DIAGNOSIS — M48.07 SPINAL STENOSIS, LUMBOSACRAL REGION: ICD-10-CM

## 2021-08-24 DIAGNOSIS — I63.9 CEREBRAL INFARCTION, UNSPECIFIED: ICD-10-CM

## 2021-08-24 DIAGNOSIS — I10 ESSENTIAL (PRIMARY) HYPERTENSION: ICD-10-CM

## 2021-08-24 DIAGNOSIS — E11.9 TYPE 2 DIABETES MELLITUS WITHOUT COMPLICATIONS: ICD-10-CM

## 2021-08-24 LAB
A1C WITH ESTIMATED AVERAGE GLUCOSE RESULT: 8.2 % — HIGH (ref 4–5.6)
ANION GAP SERPL CALC-SCNC: 14 MMOL/L — SIGNIFICANT CHANGE UP (ref 7–14)
BLD GP AB SCN SERPL QL: NEGATIVE — SIGNIFICANT CHANGE UP
BUN SERPL-MCNC: 16 MG/DL — SIGNIFICANT CHANGE UP (ref 7–23)
CALCIUM SERPL-MCNC: 9.7 MG/DL — SIGNIFICANT CHANGE UP (ref 8.4–10.5)
CHLORIDE SERPL-SCNC: 100 MMOL/L — SIGNIFICANT CHANGE UP (ref 98–107)
CO2 SERPL-SCNC: 23 MMOL/L — SIGNIFICANT CHANGE UP (ref 22–31)
CREAT SERPL-MCNC: 1.04 MG/DL — SIGNIFICANT CHANGE UP (ref 0.5–1.3)
ESTIMATED AVERAGE GLUCOSE: 189 — SIGNIFICANT CHANGE UP
GLUCOSE BLDC GLUCOMTR-MCNC: 192
GLUCOSE SERPL-MCNC: 273 MG/DL — HIGH (ref 70–99)
POTASSIUM SERPL-MCNC: 4.5 MMOL/L — SIGNIFICANT CHANGE UP (ref 3.5–5.3)
POTASSIUM SERPL-SCNC: 4.5 MMOL/L — SIGNIFICANT CHANGE UP (ref 3.5–5.3)
RH IG SCN BLD-IMP: POSITIVE — SIGNIFICANT CHANGE UP
SODIUM SERPL-SCNC: 137 MMOL/L — SIGNIFICANT CHANGE UP (ref 135–145)

## 2021-08-24 PROCEDURE — 93010 ELECTROCARDIOGRAM REPORT: CPT

## 2021-08-24 PROCEDURE — 99214 OFFICE O/P EST MOD 30 MIN: CPT

## 2021-08-24 NOTE — H&P PST ADULT - HISTORY OF PRESENT ILLNESS
72y/o male presents for preop eval for scheduled L3-S1 lumbar laminectomy and L5-S1 fusion.   Pt with c/o  lower back pain radiating down b/l legs for past ten years, that has progressively worsened with tingling b/l  legs in the past two -three months.

## 2021-08-24 NOTE — H&P PST ADULT - NEGATIVE GENERAL GENITOURINARY SYMPTOMS
no renal colic/no flank pain L/no flank pain R/no dysuria/no urinary hesitancy/normal urinary frequency

## 2021-08-24 NOTE — H&P PST ADULT - NEGATIVE ENMT SYMPTOMS
no hearing difficulty/no tinnitus/no vertigo/no sinus symptoms/no nasal congestion/no nasal obstruction/no throat pain/no dysphagia

## 2021-08-24 NOTE — H&P PST ADULT - NSICDXFAMILYHX_GEN_ALL_CORE_FT
FAMILY HISTORY:  Father  Still living? Unknown  FH: type 2 diabetes, Age at diagnosis: Age Unknown    Mother  Still living? Yes, Estimated age: Age Unknown  FH: type 2 diabetes, Age at diagnosis: Age Unknown  FHx: hypertension, Age at diagnosis: Age Unknown

## 2021-08-24 NOTE — H&P PST ADULT - NEGATIVE NEUROLOGICAL SYMPTOMS
CMp in a week at the [facility]; see me at the end of the month with CBC and CMp
no weakness

## 2021-08-24 NOTE — H&P PST ADULT - PROBLEM SELECTOR PLAN 3
Scheduled for L3-S1 lumbar laminectomy and L5-S1 fusion on 8/31/2021  Written & verbal preop instructions, gi prophylaxis & surgical soap given  Pt verbalized good understanding.  Teach back done on surgical soap instructions.

## 2021-08-24 NOTE — H&P PST ADULT - NSANTHOSAYNRD_GEN_A_CORE
No. JOSÉ screening performed.  STOP BANG Legend: 0-2 = LOW Risk; 3-4 = INTERMEDIATE Risk; 5-8 = HIGH Risk

## 2021-08-24 NOTE — H&P PST ADULT - NSICDXPASTMEDICALHX_GEN_ALL_CORE_FT
PAST MEDICAL HISTORY:  DM (diabetes mellitus)     HLD (hyperlipidemia)     HTN (hypertension)      PAST MEDICAL HISTORY:  BPH without urinary obstruction     CVA (cerebrovascular accident) 2014    DM (diabetes mellitus) type II    HLD (hyperlipidemia)     HTN (hypertension)     Spinal stenosis, lumbosacral region

## 2021-08-24 NOTE — H&P PST ADULT - PROBLEM SELECTOR PLAN 4
On Plavix, states was instructed  last dose 8/23/21 by surgeon, pt has appointment with Dr Madison today, pt to confirm  instructions  Pending copy of medical eval On Plavix, states was instructed  last dose 8/23/21 by surgeon, pt has appointment with Dr Madison today, pt to confirm  instructions  Pending copy of medical eval - elevated b/p & multiple comorbidities

## 2021-08-25 PROBLEM — I63.9 CEREBRAL INFARCTION, UNSPECIFIED: Chronic | Status: ACTIVE | Noted: 2021-08-24

## 2021-08-25 PROBLEM — N40.0 BENIGN PROSTATIC HYPERPLASIA WITHOUT LOWER URINARY TRACT SYMPTOMS: Chronic | Status: ACTIVE | Noted: 2021-08-24

## 2021-08-25 PROBLEM — M48.07 SPINAL STENOSIS, LUMBOSACRAL REGION: Chronic | Status: ACTIVE | Noted: 2021-08-24

## 2021-08-25 LAB
MRSA PCR RESULT.: SIGNIFICANT CHANGE UP
S AUREUS DNA NOSE QL NAA+PROBE: DETECTED

## 2021-08-26 ENCOUNTER — APPOINTMENT (OUTPATIENT)
Dept: ULTRASOUND IMAGING | Facility: CLINIC | Age: 71
End: 2021-08-26
Payer: MEDICARE

## 2021-08-26 ENCOUNTER — OUTPATIENT (OUTPATIENT)
Dept: OUTPATIENT SERVICES | Facility: HOSPITAL | Age: 71
LOS: 1 days | End: 2021-08-26
Payer: MEDICARE

## 2021-08-26 ENCOUNTER — NON-APPOINTMENT (OUTPATIENT)
Age: 71
End: 2021-08-26

## 2021-08-26 DIAGNOSIS — E11.9 TYPE 2 DIABETES MELLITUS WITHOUT COMPLICATIONS: ICD-10-CM

## 2021-08-26 PROCEDURE — 93880 EXTRACRANIAL BILAT STUDY: CPT

## 2021-08-26 PROCEDURE — 93880 EXTRACRANIAL BILAT STUDY: CPT | Mod: 26

## 2021-08-27 ENCOUNTER — NON-APPOINTMENT (OUTPATIENT)
Age: 71
End: 2021-08-27

## 2021-08-27 NOTE — ASSESSMENT
[High Risk Surgery - Intraperitoneal, Intrathoracic or Supringuinal Vascular Procedures] : High Risk Surgery - Intraperitoneal, Intrathoracic or Supringuinal Vascular Procedures - No (0) [Ischemic Heart Disease] : Ischemic Heart Disease - No (0) [Congestive Heart Failure] : Congestive Heart Failure - No (0) [Prior Cerebrovascular Accident or TIA] : Prior Cerebrovascular Accident or TIA - Yes (1) [Creatinine >= 2mg/dL (1 Point)] : Creatinine >= 2mg/dL - No (0) [Insulin-dependent Diabetic (1 Point)] : Insulin-dependent Diabetic - No (0) [1] : 1 , RCRI Class: II, Risk of Post-Op Cardiac Complications: 6.0%, 95% CI for Risk Estimate: 4.9% - 7.4% [Continue medications as is] : Continue current medications [As per surgery] : as per surgery [Patient NOT optimized for Surgery at this time] : Patient not optimized for surgery at this time [Other: _____] : [unfilled] [Modify anti-platelet treatment prior to procedure] : Modify anti-platelet treatment prior to procedure [FreeTextEntry4] : He needed to get tuned up with better BP and blood glucose before his spinal surgery given hx of CVA in 2014.\par Pt may need to delay his surgery depends on the response to the medication change and management. \par Rx made for carotid doppler since last one was in 3/2018.\par Increased his Glimepiride to 1mg po bid from 1mg qd. continue metformin 1000mg po bid.\par Increased his Lisinopril to 20mg po AM and 10mg po PM from 10mg bid. \par He will return to our clinic in 7 to 10days with home BP log and finger stick log. \par Pt will contact Ortho to discuss above. \par -Metformin will be held 48 hours prior to surgery. [FreeTextEntry6] : Hold plavix 1week prior to surgery. [FreeTextEntry7] : Instructed not to take aspirin, NSAID, supplement, vitamin 1 week prior to surgery.

## 2021-08-27 NOTE — END OF VISIT
[FreeTextEntry3] : Patient seen and examined in conjunction with Mable Roy. NP acting as practitioner and scribe.  I agree with the history and plan as outlined with modifications documented as appropriate.\par \par In addition will need to hold plavix 1 week prior to the surgery once cleared for surgery.

## 2021-08-27 NOTE — PHYSICAL EXAM
[No Acute Distress] : no acute distress [Normal Sclera/Conjunctiva] : normal sclera/conjunctiva [Normal Outer Ear/Nose] : the outer ears and nose were normal in appearance [No JVD] : no jugular venous distention [No Lymphadenopathy] : no lymphadenopathy [Normal Rate] : normal rate  [Regular Rhythm] : with a regular rhythm [Normal S1, S2] : normal S1 and S2 [No Carotid Bruits] : no carotid bruits [No Abdominal Bruit] : a ~M bruit was not heard ~T in the abdomen [Pedal Pulses Present] : the pedal pulses are present [No Edema] : there was no peripheral edema [Normal] : affect was normal and insight and judgment were intact [Comprehensive Foot Exam Normal] : Right and left foot were examined and both feet are normal. No ulcers in either foot. Toes are normal and with full ROM.  Normal tactile sensation with monofilament testing throughout both feet [de-identified] : 2/6 systolic murmur of border of sternum [de-identified] : l [de-identified] : no rash, no ulcer

## 2021-08-27 NOTE — HISTORY OF PRESENT ILLNESS
[Chronic Anticoagulation] : chronic anticoagulation [Diabetes] : diabetes [____ METs%] : [unfilled] METs% [Moderate (4-6 METs)] : Moderate (4-6 METs) [(Patient denies any chest pain, claudication, dyspnea on exertion, orthopnea, palpitations or syncope)] : Patient denies any chest pain, claudication, dyspnea on exertion, orthopnea, palpitations or syncope [Aortic Stenosis] : no aortic stenosis [Atrial Fibrillation] : no atrial fibrillation [Coronary Artery Disease] : no coronary artery disease [Recent Myocardial Infarction] : no recent myocardial infarction [Implantable Device/Pacemaker] : no implantable device/pacemaker [Asthma] : no asthma [COPD] : no COPD [Sleep Apnea] : no sleep apnea [Smoker] : not a smoker [Family Member] : no family member with adverse anesthesia reaction/sudden death [Self] : no previous adverse anesthesia reaction [Chronic Kidney Disease] : no chronic kidney disease [FreeTextEntry1] : L3-S1 decompression and possible fusion L5-S1 [FreeTextEntry3] : Dr. Zhu, Deondre @ [facility] [FreeTextEntry2] : 8/31/21 [FreeTextEntry4] : DEBBY ROSALES  is a 71 year old M with Lumbar radiculopathy, Lumbosacral stenosis, BPH,HTN, T2DM, HLD, CVA in 2014 with minimal  residual deficit on plavix presented today for pre-op evaluation. He reported severe rt side sciatica from LBP to rt leg burning, tingling, cold, calf pressure and needed a cane for ambulation.  \par RE CVA in 2014: follows with Dr Libman, on Plavix, minimal residual deficits. Left side mild weakness but recovered to almost 90%. Occasional slurred speech. Denied fever, chills, CP, palpitation, leg swelling, urinary/ GI issues. \par However his BP and blood sugar level were not acceptable today. He did not check his home BP nor blood sugar.\par Pt stated his sciatica bothered him recent 1 and a half month. He tried acupunctures without effect. He did not try physical therapy this time. Last PT was right after stroke. \par \par  [FreeTextEntry5] : been on Plavix 75m po qd for CVA in 2014, and DM with oral antidiabetic agents. [FreeTextEntry6] : BP was uncontrolled, 160/98, 170/86 today. \par T2DM was also poorly controlled: POCT hbA1c 8.3%, blood glucose 192. [FreeTextEntry7] : stress test in 2018: no significant ischemic ST seg change.\par Carotid doppler in 3/2018:  mild 40% stenosis of lt internal carotid artery.\par Pt went to [facility] this morning and done with lab and ECG: to get the document.  [FreeTextEntry8] : limited activities due to rt leg nerve pain and LBP. no issues in walking up/down stairs, walking on the ground level

## 2021-08-28 ENCOUNTER — APPOINTMENT (OUTPATIENT)
Dept: DISASTER EMERGENCY | Facility: CLINIC | Age: 71
End: 2021-08-28

## 2021-08-31 ENCOUNTER — APPOINTMENT (OUTPATIENT)
Dept: ORTHOPEDIC SURGERY | Facility: HOSPITAL | Age: 71
End: 2021-08-31

## 2021-09-08 ENCOUNTER — APPOINTMENT (OUTPATIENT)
Dept: INTERNAL MEDICINE | Facility: CLINIC | Age: 71
End: 2021-09-08
Payer: MEDICARE

## 2021-09-08 VITALS
HEART RATE: 84 BPM | BODY MASS INDEX: 24.91 KG/M2 | RESPIRATION RATE: 12 BRPM | SYSTOLIC BLOOD PRESSURE: 128 MMHG | WEIGHT: 155 LBS | DIASTOLIC BLOOD PRESSURE: 68 MMHG | OXYGEN SATURATION: 98 % | HEIGHT: 66 IN

## 2021-09-08 PROCEDURE — 99213 OFFICE O/P EST LOW 20 MIN: CPT

## 2021-09-09 NOTE — PHYSICAL EXAM
[No Acute Distress] : no acute distress [No JVD] : no jugular venous distention [No Carotid Bruits] : no carotid bruits [Pedal Pulses Present] : the pedal pulses are present [No Edema] : there was no peripheral edema [Normal] : soft, non-tender, non-distended, no masses palpated, no HSM and normal bowel sounds [No CVA Tenderness] : no CVA  tenderness [No Joint Swelling] : no joint swelling [No Rash] : no rash [Coordination Grossly Intact] : coordination grossly intact

## 2021-09-13 ENCOUNTER — APPOINTMENT (OUTPATIENT)
Dept: ORTHOPEDIC SURGERY | Facility: CLINIC | Age: 71
End: 2021-09-13

## 2021-09-18 NOTE — REVIEW OF SYSTEMS
[Fever] : no fever [Chills] : no chills [Discharge] : no discharge [Earache] : no earache [Chest Pain] : no chest pain [Palpitations] : no palpitations [Lower Ext Edema] : no lower extremity edema [Orthopena] : no orthopnea [Shortness Of Breath] : no shortness of breath [Wheezing] : no wheezing [Abdominal Pain] : no abdominal pain [Nausea] : no nausea [Vomiting] : no vomiting [Dysuria] : no dysuria [Itching] : no itching [Headache] : no headache [Dizziness] : no dizziness [Suicidal] : not suicidal [Insomnia] : no insomnia [Anxiety] : no anxiety [FreeTextEntry9] : see HPI

## 2021-09-18 NOTE — END OF VISIT
[FreeTextEntry3] : Patient seen and examined in conjunction with Mable Roy. NP acting as practitioner and scribe.  I agree with the history and plan as outlined with modifications documented as appropriate.\par

## 2021-09-18 NOTE — ASSESSMENT
[FreeTextEntry1] : It's a low risk surgery, low risk pt and there is no contraindication for surgery.\par Initially this surgery was planned on 8/3/21 but postponed due to pt's condition was not optimized yet.\par Pt's BP and blood glucose level improved after medication adjustment. \par BP was 128/68 without red flag symptoms.\par Serum glucose was 139 today. \par \par Instructed to hold plavix, aspirin, NSAID, herb, supplement 7 days prior to surgery.\par Hold Metformine 48hours before surgery, and Glimepiride 24hour before surgery. Pt verbalized understood.\par \par Pt may proceed to surgery L3-S1 decompression and possible fusion L5-S1 by Dr. Zhu on the rescheduled day. \par \par

## 2021-09-18 NOTE — HISTORY OF PRESENT ILLNESS
[FreeTextEntry1] : RPA [de-identified] : DEBBY ROSALES is a 71 year old M with Lumbar radiculopathy, Lumbosacral stenosis, BPH,HTN, T2DM, HLD, CVA in 2014 with minimal residual deficit on plavix presented today for pre-op evaluation. He reported severe rt side sciatica from LBP to rt leg burning, tingling, cold, calf pressure and needed a cane for ambulation. He visited us on 8/24/21 for pre-op medical evaluation but was not cleared due to uncontrolled HTN and T2DM( HbA1C on 7/27/21 8.3%). \par He came back with home BP log ( 110-138/80s) and finger stick log( before breakfast ,  after dinner 214-255).\par Denied dizziness, vision change, cold sweating, shaky feeling 2/2 low sugar level.\par His pain on lower back radiating to Rt lower extremity remains same level with tingling, burning sense. \par \par \par

## 2021-09-20 ENCOUNTER — TRANSCRIPTION ENCOUNTER (OUTPATIENT)
Age: 71
End: 2021-09-20

## 2021-10-13 ENCOUNTER — APPOINTMENT (OUTPATIENT)
Dept: DERMATOLOGY | Facility: CLINIC | Age: 71
End: 2021-10-13

## 2021-10-15 ENCOUNTER — TRANSCRIPTION ENCOUNTER (OUTPATIENT)
Age: 71
End: 2021-10-15

## 2021-10-15 ENCOUNTER — RX RENEWAL (OUTPATIENT)
Age: 71
End: 2021-10-15

## 2021-11-26 ENCOUNTER — RX RENEWAL (OUTPATIENT)
Age: 71
End: 2021-11-26

## 2021-12-01 LAB
ANION GAP SERPL CALC-SCNC: 15 MMOL/L
BUN SERPL-MCNC: 19 MG/DL
CALCIUM SERPL-MCNC: 9.4 MG/DL
CHLORIDE SERPL-SCNC: 100 MMOL/L
CO2 SERPL-SCNC: 23 MMOL/L
CREAT SERPL-MCNC: 1.09 MG/DL
FRUCTOSAMINE SERPL-MCNC: 302 UMOL/L
GLUCOSE SERPL-MCNC: 120 MG/DL
POTASSIUM SERPL-SCNC: 4.4 MMOL/L
SODIUM SERPL-SCNC: 138 MMOL/L

## 2022-01-12 ENCOUNTER — NON-APPOINTMENT (OUTPATIENT)
Age: 72
End: 2022-01-12

## 2022-01-13 ENCOUNTER — APPOINTMENT (OUTPATIENT)
Dept: INTERNAL MEDICINE | Facility: CLINIC | Age: 72
End: 2022-01-13
Payer: MEDICARE

## 2022-01-13 VITALS
HEART RATE: 82 BPM | TEMPERATURE: 98.3 F | SYSTOLIC BLOOD PRESSURE: 139 MMHG | WEIGHT: 155 LBS | HEIGHT: 66 IN | DIASTOLIC BLOOD PRESSURE: 70 MMHG | OXYGEN SATURATION: 97 % | BODY MASS INDEX: 24.91 KG/M2

## 2022-01-13 DIAGNOSIS — Z13.31 ENCOUNTER FOR SCREENING FOR DEPRESSION: ICD-10-CM

## 2022-01-13 PROCEDURE — G0444 DEPRESSION SCREEN ANNUAL: CPT | Mod: 59

## 2022-01-13 PROCEDURE — 99215 OFFICE O/P EST HI 40 MIN: CPT

## 2022-01-13 PROCEDURE — G0442 ANNUAL ALCOHOL SCREEN 15 MIN: CPT | Mod: 59

## 2022-01-13 NOTE — ASSESSMENT
[FreeTextEntry1] : \par Diabetes: recheck A1c today; encouraged lifestyle change with low-carb diet and 30 mins aerobic exercise daily\par \par Hypertension: check renal panel, electrolytes via blood draw; encouraged low-salt diet\par \par Hypercholesterolemia: check fasting lipid panel via blood draw; renewed statin medication\par \par Depression screen performed today, PHQ2=0, negative.\par \par Annual alcohol misuse screen, 15 mins, done; negative.\par

## 2022-01-13 NOTE — HEALTH RISK ASSESSMENT
[Never] : Never [No] : No [1 or 2 (0 pts)] : 1 or 2 (0 points) [Never (0 pts)] : Never (0 points) [No falls in past year] : Patient reported no falls in the past year [0] : 2) Feeling down, depressed, or hopeless: Not at all (0) [PHQ-2 Negative - No further assessment needed] : PHQ-2 Negative - No further assessment needed [Audit-CScore] : 0 [[id number]] : 0

## 2022-01-13 NOTE — HISTORY OF PRESENT ILLNESS
[FreeTextEntry1] : Follow-up diabetes, hypertension and hypercholesterolemia [de-identified] : \par Diabetes: A1c had increased to above 8 last time; he reports compliance with medications; not always adherent to low-carb diet but he tries; trying to exercise and maintain healthy body weight as well; denies polyuria and polydipsia\par \par Hypertension: on medication, denies side effects; trying to limit salt intake; denies chest pains, palpitations, SOB\par \par Hypercholesterolemia: on statin, denies myalgias/muscle aches; denies chest pains

## 2022-01-14 LAB
ALBUMIN SERPL ELPH-MCNC: 4.5 G/DL
ALP BLD-CCNC: 68 U/L
ALT SERPL-CCNC: 23 U/L
ANION GAP SERPL CALC-SCNC: 14 MMOL/L
AST SERPL-CCNC: 23 U/L
BASOPHILS # BLD AUTO: 0.07 K/UL
BASOPHILS NFR BLD AUTO: 0.9 %
BILIRUB SERPL-MCNC: 0.4 MG/DL
BUN SERPL-MCNC: 15 MG/DL
CALCIUM SERPL-MCNC: 9.5 MG/DL
CHLORIDE SERPL-SCNC: 103 MMOL/L
CHOLEST SERPL-MCNC: 173 MG/DL
CO2 SERPL-SCNC: 24 MMOL/L
CREAT SERPL-MCNC: 1.15 MG/DL
EOSINOPHIL # BLD AUTO: 0.14 K/UL
EOSINOPHIL NFR BLD AUTO: 1.9 %
ESTIMATED AVERAGE GLUCOSE: 160 MG/DL
GLUCOSE SERPL-MCNC: 112 MG/DL
HBA1C MFR BLD HPLC: 7.2 %
HCT VFR BLD CALC: 44.9 %
HDLC SERPL-MCNC: 48 MG/DL
HGB BLD-MCNC: 13.8 G/DL
IMM GRANULOCYTES NFR BLD AUTO: 0.3 %
LDLC SERPL CALC-MCNC: 98 MG/DL
LYMPHOCYTES # BLD AUTO: 2.66 K/UL
LYMPHOCYTES NFR BLD AUTO: 35.4 %
MAN DIFF?: NORMAL
MCHC RBC-ENTMCNC: 27.9 PG
MCHC RBC-ENTMCNC: 30.7 GM/DL
MCV RBC AUTO: 90.9 FL
MONOCYTES # BLD AUTO: 0.5 K/UL
MONOCYTES NFR BLD AUTO: 6.7 %
NEUTROPHILS # BLD AUTO: 4.12 K/UL
NEUTROPHILS NFR BLD AUTO: 54.8 %
NONHDLC SERPL-MCNC: 125 MG/DL
PLATELET # BLD AUTO: 277 K/UL
POTASSIUM SERPL-SCNC: 4.8 MMOL/L
PROT SERPL-MCNC: 7.3 G/DL
PSA SERPL-MCNC: 6.65 NG/ML
RBC # BLD: 4.94 M/UL
RBC # FLD: 13.2 %
SODIUM SERPL-SCNC: 141 MMOL/L
TRIGL SERPL-MCNC: 136 MG/DL
TSH SERPL-ACNC: 1.83 UIU/ML
WBC # FLD AUTO: 7.51 K/UL

## 2022-02-08 ENCOUNTER — APPOINTMENT (OUTPATIENT)
Dept: OTOLARYNGOLOGY | Facility: CLINIC | Age: 72
End: 2022-02-08
Payer: MEDICARE

## 2022-02-08 VITALS
DIASTOLIC BLOOD PRESSURE: 88 MMHG | BODY MASS INDEX: 24.11 KG/M2 | HEIGHT: 66 IN | HEART RATE: 99 BPM | WEIGHT: 150 LBS | SYSTOLIC BLOOD PRESSURE: 158 MMHG

## 2022-02-08 DIAGNOSIS — H93.13 TINNITUS, BILATERAL: ICD-10-CM

## 2022-02-08 PROCEDURE — 92557 COMPREHENSIVE HEARING TEST: CPT

## 2022-02-08 PROCEDURE — 92567 TYMPANOMETRY: CPT

## 2022-02-08 PROCEDURE — 99203 OFFICE O/P NEW LOW 30 MIN: CPT

## 2022-02-08 NOTE — PHYSICAL EXAM
[Midline] : trachea located in midline position [Normal] : no rashes [de-identified] : Dry EAC's.  Cerumen on L.  Removed.

## 2022-02-08 NOTE — CONSULT LETTER
[Dear  ___] : Dear  [unfilled], [Please see my note below.] : Please see my note below. [Sincerely,] : Sincerely, [Consult Letter:] : I had the pleasure of evaluating your patient, [unfilled]. [FreeTextEntry2] : Markell Soto MD [FreeTextEntry3] : Manuel Molina MD, FACS\par Chief of Otolaryngology at [facility]\par \par Dept. of Otolaryngology\par [facility]\par

## 2022-02-08 NOTE — HISTORY OF PRESENT ILLNESS
[de-identified] : 71 year old male here for itchy ears, bilateral tinnitus, left worse than right.   Patient denies otalgia, otorrhea, ear infections, hearing loss,  dizziness, vertigo, headaches related to hearing.  \par History of CVA 2014.  States when eating fast, sometimes chokes.  States sometimes chokes on saliva.  \par

## 2022-02-08 NOTE — REASON FOR VISIT
[Initial Evaluation] : an initial evaluation for [FreeTextEntry2] : here for itchy ears, bilateral tinnitus, left worse than right

## 2022-03-20 ENCOUNTER — RX RENEWAL (OUTPATIENT)
Age: 72
End: 2022-03-20

## 2022-04-14 ENCOUNTER — APPOINTMENT (OUTPATIENT)
Dept: INTERNAL MEDICINE | Facility: CLINIC | Age: 72
End: 2022-04-14
Payer: MEDICARE

## 2022-04-14 VITALS
HEART RATE: 77 BPM | SYSTOLIC BLOOD PRESSURE: 140 MMHG | TEMPERATURE: 97.3 F | HEIGHT: 66 IN | OXYGEN SATURATION: 98 % | BODY MASS INDEX: 24.91 KG/M2 | WEIGHT: 155 LBS | DIASTOLIC BLOOD PRESSURE: 80 MMHG

## 2022-04-14 PROCEDURE — 99215 OFFICE O/P EST HI 40 MIN: CPT

## 2022-04-14 NOTE — HEALTH RISK ASSESSMENT
[Never] : Never [No] : No [1 or 2 (0 pts)] : 1 or 2 (0 points) [Never (0 pts)] : Never (0 points) [No falls in past year] : Patient reported no falls in the past year [0] : 2) Feeling down, depressed, or hopeless: Not at all (0) [PHQ-2 Negative - No further assessment needed] : PHQ-2 Negative - No further assessment needed [Audit-CScore] : 0 [[id number]] : 0

## 2022-04-14 NOTE — ASSESSMENT
[FreeTextEntry1] : \par Diabetes: recheck A1c today; encouraged lifestyle change with low-carb diet and 30 mins aerobic exercise daily\par \par Hypertension: check renal panel, electrolytes via blood draw; encouraged low-salt diet\par \par Hypercholesterolemia: check fasting lipid panel via blood draw; renewed statin medication\par \par

## 2022-04-14 NOTE — HISTORY OF PRESENT ILLNESS
[FreeTextEntry1] : Follow-up diabetes, hypertension and hypercholesterolemia [de-identified] : \par Diabetes: A1c had increased to above 8 last time; he reports compliance with medications; not always adherent to low-carb diet but he tries; trying to exercise and maintain healthy body weight as well; denies polyuria and polydipsia\par \par Hypertension: on medication, denies side effects; trying to limit salt intake; denies chest pains, palpitations, SOB\par \par Hypercholesterolemia: on statin, denies myalgias/muscle aches; denies chest pains

## 2022-06-01 ENCOUNTER — RX RENEWAL (OUTPATIENT)
Age: 72
End: 2022-06-01

## 2022-08-05 ENCOUNTER — APPOINTMENT (OUTPATIENT)
Dept: INTERNAL MEDICINE | Facility: CLINIC | Age: 72
End: 2022-08-05

## 2022-08-05 VITALS
HEART RATE: 90 BPM | HEIGHT: 66 IN | TEMPERATURE: 96.7 F | BODY MASS INDEX: 24.75 KG/M2 | SYSTOLIC BLOOD PRESSURE: 162 MMHG | WEIGHT: 154 LBS | OXYGEN SATURATION: 98 % | DIASTOLIC BLOOD PRESSURE: 74 MMHG

## 2022-08-05 LAB
ALBUMIN SERPL ELPH-MCNC: 4.5 G/DL
ALP BLD-CCNC: 53 U/L
ALT SERPL-CCNC: 17 U/L
ANION GAP SERPL CALC-SCNC: 13 MMOL/L
AST SERPL-CCNC: 19 U/L
BASOPHILS # BLD AUTO: 0.06 K/UL
BASOPHILS NFR BLD AUTO: 0.7 %
BILIRUB SERPL-MCNC: 0.3 MG/DL
BUN SERPL-MCNC: 22 MG/DL
CALCIUM SERPL-MCNC: 9.3 MG/DL
CHLORIDE SERPL-SCNC: 102 MMOL/L
CHOLEST SERPL-MCNC: 153 MG/DL
CO2 SERPL-SCNC: 24 MMOL/L
CREAT SERPL-MCNC: 1.17 MG/DL
EGFR: 65 ML/MIN/1.73M2
EOSINOPHIL # BLD AUTO: 0.17 K/UL
EOSINOPHIL NFR BLD AUTO: 2.1 %
ESTIMATED AVERAGE GLUCOSE: 154 MG/DL
GLUCOSE SERPL-MCNC: 118 MG/DL
HBA1C MFR BLD HPLC: 7 %
HCT VFR BLD CALC: 40.4 %
HDLC SERPL-MCNC: 51 MG/DL
HGB BLD-MCNC: 13.5 G/DL
IMM GRANULOCYTES NFR BLD AUTO: 0.4 %
LDLC SERPL CALC-MCNC: 87 MG/DL
LYMPHOCYTES # BLD AUTO: 3.13 K/UL
LYMPHOCYTES NFR BLD AUTO: 38.5 %
MAN DIFF?: NORMAL
MCHC RBC-ENTMCNC: 28.7 PG
MCHC RBC-ENTMCNC: 33.4 GM/DL
MCV RBC AUTO: 86 FL
MONOCYTES # BLD AUTO: 0.56 K/UL
MONOCYTES NFR BLD AUTO: 6.9 %
NEUTROPHILS # BLD AUTO: 4.18 K/UL
NEUTROPHILS NFR BLD AUTO: 51.4 %
NONHDLC SERPL-MCNC: 101 MG/DL
PLATELET # BLD AUTO: 246 K/UL
POTASSIUM SERPL-SCNC: 5.1 MMOL/L
PROT SERPL-MCNC: 6.6 G/DL
RBC # BLD: 4.7 M/UL
RBC # FLD: 13.5 %
SODIUM SERPL-SCNC: 140 MMOL/L
TRIGL SERPL-MCNC: 73 MG/DL
TSH SERPL-ACNC: 2.09 UIU/ML
WBC # FLD AUTO: 8.13 K/UL

## 2022-08-05 PROCEDURE — 99215 OFFICE O/P EST HI 40 MIN: CPT

## 2022-08-05 NOTE — ASSESSMENT
[FreeTextEntry1] : \par Diabetes: rechecked A1c; encouraged lifestyle change with low-carb diet and 30 mins aerobic exercise daily\par \par Hypertension: checked renal panel, electrolytes via blood draw; encouraged low-salt diet\par \par Hypercholesterolemia: checked fasting lipid panel; renewed statin medication\par \par Lumbar back pain: check MRI\par \par

## 2022-08-05 NOTE — HISTORY OF PRESENT ILLNESS
[FreeTextEntry1] : Follow-up diabetes, hypertension and hypercholesterolemia. Also with lumbar pain. [de-identified] : \par Diabetes: A1c had increased to above 8 last time; he reports compliance with medications; not always adherent to low-carb diet but he tries; trying to exercise and maintain healthy body weight as well; denies polyuria and polydipsia\par \par Hypertension: on medication, denies side effects; trying to limit salt intake; denies chest pains, palpitations, SOB\par \par Hypercholesterolemia: on statin, denies myalgias/muscle aches; denies chest pains\par \par Lumbar back pain: radiating down both legs, causing numbness and tingling for 6 months

## 2022-08-05 NOTE — HEALTH RISK ASSESSMENT
[Never] : Never [No] : No [1 or 2 (0 pts)] : 1 or 2 (0 points) [Never (0 pts)] : Never (0 points) [No falls in past year] : Patient reported no falls in the past year [0] : 2) Feeling down, depressed, or hopeless: Not at all (0) [PHQ-2 Negative - No further assessment needed] : PHQ-2 Negative - No further assessment needed [Audit-CScore] : 0 [[id number]] : 0

## 2022-08-09 ENCOUNTER — OUTPATIENT (OUTPATIENT)
Dept: OUTPATIENT SERVICES | Facility: HOSPITAL | Age: 72
LOS: 1 days | End: 2022-08-09
Payer: MEDICARE

## 2022-08-09 ENCOUNTER — APPOINTMENT (OUTPATIENT)
Dept: MRI IMAGING | Facility: CLINIC | Age: 72
End: 2022-08-09

## 2022-08-09 DIAGNOSIS — M54.16 RADICULOPATHY, LUMBAR REGION: ICD-10-CM

## 2022-08-09 PROCEDURE — 72148 MRI LUMBAR SPINE W/O DYE: CPT

## 2022-08-09 PROCEDURE — 72148 MRI LUMBAR SPINE W/O DYE: CPT | Mod: 26,MH

## 2022-08-24 ENCOUNTER — APPOINTMENT (OUTPATIENT)
Dept: SPINE | Facility: CLINIC | Age: 72
End: 2022-08-24

## 2022-08-24 ENCOUNTER — TRANSCRIPTION ENCOUNTER (OUTPATIENT)
Age: 72
End: 2022-08-24

## 2022-08-24 VITALS
HEIGHT: 66 IN | HEART RATE: 77 BPM | DIASTOLIC BLOOD PRESSURE: 84 MMHG | WEIGHT: 154 LBS | SYSTOLIC BLOOD PRESSURE: 183 MMHG | BODY MASS INDEX: 24.75 KG/M2 | OXYGEN SATURATION: 96 %

## 2022-08-24 PROCEDURE — 99204 OFFICE O/P NEW MOD 45 MIN: CPT

## 2022-08-24 RX ORDER — PREDNISONE 50 MG/1
50 TABLET ORAL
Qty: 3 | Refills: 0 | Status: DISCONTINUED | COMMUNITY
Start: 2021-06-02 | End: 2022-08-24

## 2022-08-24 RX ORDER — TRIAMCINOLONE ACETONIDE 1 MG/G
0.1 PASTE DENTAL 3 TIMES DAILY
Qty: 1 | Refills: 3 | Status: DISCONTINUED | COMMUNITY
Start: 2019-12-31 | End: 2022-08-24

## 2022-08-24 RX ORDER — CYCLOBENZAPRINE HYDROCHLORIDE 10 MG/1
10 TABLET, FILM COATED ORAL 3 TIMES DAILY
Qty: 30 | Refills: 0 | Status: DISCONTINUED | COMMUNITY
Start: 2021-07-28 | End: 2022-08-24

## 2022-08-24 RX ORDER — TAMSULOSIN HYDROCHLORIDE 0.4 MG/1
0.4 CAPSULE ORAL
Qty: 180 | Refills: 3 | Status: DISCONTINUED | COMMUNITY
Start: 2021-04-29 | End: 2022-08-24

## 2022-08-24 RX ORDER — CLOTRIMAZOLE AND BETAMETHASONE DIPROPIONATE 10; .5 MG/G; MG/G
1-0.05 CREAM TOPICAL TWICE DAILY
Qty: 1 | Refills: 0 | Status: DISCONTINUED | COMMUNITY
Start: 2021-04-29 | End: 2022-08-24

## 2022-08-24 RX ORDER — KETOCONAZOLE 20 MG/G
2 CREAM TOPICAL TWICE DAILY
Qty: 1 | Refills: 2 | Status: DISCONTINUED | COMMUNITY
Start: 2021-08-12 | End: 2022-08-24

## 2022-08-24 RX ORDER — KETOCONAZOLE 20.5 MG/ML
2 SHAMPOO, SUSPENSION TOPICAL
Qty: 1 | Refills: 11 | Status: DISCONTINUED | COMMUNITY
Start: 2021-08-12 | End: 2022-08-24

## 2022-08-24 RX ORDER — IPRATROPIUM BROMIDE 42 UG/1
0.06 SPRAY NASAL 3 TIMES DAILY
Qty: 15 | Refills: 1 | Status: DISCONTINUED | COMMUNITY
Start: 2019-12-31 | End: 2022-08-24

## 2022-08-24 RX ORDER — CAMPHOR 0.45 %
25 GEL (GRAM) TOPICAL
Qty: 1 | Refills: 0 | Status: DISCONTINUED | COMMUNITY
Start: 2021-06-02 | End: 2022-08-24

## 2022-08-24 RX ORDER — MUPIROCIN 20 MG/G
2 OINTMENT TOPICAL
Qty: 1 | Refills: 0 | Status: DISCONTINUED | COMMUNITY
Start: 2021-08-26 | End: 2022-08-24

## 2022-08-24 RX ORDER — CHLORHEXIDINE GLUCONATE, 0.12% ORAL RINSE 1.2 MG/ML
0.12 SOLUTION DENTAL
Qty: 473 | Refills: 0 | Status: DISCONTINUED | COMMUNITY
Start: 2021-11-29 | End: 2022-08-24

## 2022-09-01 ENCOUNTER — APPOINTMENT (OUTPATIENT)
Dept: UROLOGY | Facility: CLINIC | Age: 72
End: 2022-09-01

## 2022-09-01 PROCEDURE — 99214 OFFICE O/P EST MOD 30 MIN: CPT

## 2022-09-01 NOTE — HISTORY OF PRESENT ILLNESS
[FreeTextEntry1] : Follow-up BPH.  Patient on Flomax twice daily.  Strong urine flow but nocturia.  Patient has poorly controlled diabetes.  His A1c is ranged from 7-8.0.\par \par Encourage fluid restriction.  Improve glycemic control.  Follow-up 3 months.\par \par Repeat PSA PSA was 6.65.  PSA stable.  PI-RADS 2 on prior MRI.  Repeat PSA PSA 1 year\par \par Please refer to URO Consult note

## 2022-09-01 NOTE — LETTER BODY
[FreeTextEntry1] : Betty Madison MD\par [address] [facility],\[place] [address]\par [phone]\par \par Dear Dr. Lepe,\par \par Reason for Visit: BPH. Erectile dysfunction. Elevated PSA. Balanitis, resolved.\par \par This is a 72 year-old gentleman with history of stroke, mild left-sided hemiparesis, hypertension, and diabetes, presenting with BPH, balanitis, and erectile dysfunction. The patient underwent an unremarkable prostate MRI in June 2021 which demonstrated PI-RADS 2. The patient returns today for follow up. Since he was last seen, patient reports taking Flomax BID as directed without any difficulties or side effects. Strong urine flow but persistent nocturia. Patient has poorly controlled diabetes. His A1c is ranged from 7-8.0. All other review of systems are negative. His social and family history remain unchanged. Past medical history, family history and social history were inquired and were noncontributory to current condition.. Medications and allergies were reviewed. He has no known allergies to medication. \par \par On examination, the patient is a well  appearing gentleman in no acute distress. He is alert and oriented follows commands. He has normal mood and affect. He is normocephalic. Neck is supple. Oral no thrush Respirations are unlabored. His abdomen is soft and nontender. Bladder is nonpalpable. No CVA tenderness. Neurologically he is grossly intact. He has mild left sided weakness. No peripheral edema. Skin without gross abnormality. He has normal male external genitalia. Normal meatus. Bilateral testes are descended intrascrotally and normal to palpation. On rectal examination, there is normal sphincter tone. The prostate is clinically benign without focal induration or nodularity. He has mild balanitis. His foreskin is retractable.\par \par His previous PSA was 6.65, which is elevated but stable. His previous PSA was 6.13.\par \par ASSESSMENT: BPH. Erectile dysfunction. Elevated PSA, stable. Balanitis, resolved.\par \par I counseled the patient. In terms of his BPH, the patient reports strong urine flow on medical therapy. I recommended the patient continue Flomax BID. I renewed the patient's prescription for Flomax today. I encouraged the patient to continue medications regularly as directed. I encouraged fluid restriction. I recommended he improve glycemic control. In terms of his elevated PSA, his pervious PSA was 6.65 which is elevated but stable. His prostate MRI showed PI-RADS 2. I reassured the patient the risk of occult malignancy is low. I recommended he repeat PSA and BMP in a year to ensure stability. Risks and alternatives were discussed. I answered the patient questions. The patient will follow-up in 3 months and will contact me with any questions or concerns. Thank you for the opportunity to participate in the care of Mr. ROSALES. I will keep you updated on his progress. \par \par Plan: Continue Flomax BID. PSA. BMP. Follow up in 3 months.

## 2022-09-27 ENCOUNTER — APPOINTMENT (OUTPATIENT)
Dept: INTERNAL MEDICINE | Facility: CLINIC | Age: 72
End: 2022-09-27

## 2022-09-27 VITALS
BODY MASS INDEX: 24.59 KG/M2 | WEIGHT: 153 LBS | TEMPERATURE: 97.3 F | HEART RATE: 97 BPM | OXYGEN SATURATION: 97 % | DIASTOLIC BLOOD PRESSURE: 70 MMHG | HEIGHT: 66 IN | SYSTOLIC BLOOD PRESSURE: 145 MMHG

## 2022-09-27 PROCEDURE — G0439: CPT

## 2022-09-27 NOTE — ASSESSMENT
[FreeTextEntry1] : \par Preventive visit: patient is up to date with vaccines; he is up to date with colonoscopy screening and prostate cancer screening; he does not smoke cigarettes and does not misuse alcohol; he feels safe at home and has smoke/CO detectors in the house; he wears seatbelts when in vehicles\par

## 2022-09-27 NOTE — HISTORY OF PRESENT ILLNESS
[FreeTextEntry1] : Preventive visit [de-identified] : \par Preventive visit: patient is up to date with vaccines; he is up to date with colonoscopy screening and prostate cancer screening; he does not smoke cigarettes and does not misuse alcohol; he feels safe at home and has smoke/CO detectors in the house; he wears seatbelts when in vehicles\par

## 2022-09-27 NOTE — HEALTH RISK ASSESSMENT
[Good] : ~his/her~  mood as  good [Never] : Never [No] : No [1 or 2 (0 pts)] : 1 or 2 (0 points) [Never (0 pts)] : Never (0 points) [0] : 2) Feeling down, depressed, or hopeless: Not at all (0) [PHQ-2 Negative - No further assessment needed] : PHQ-2 Negative - No further assessment needed [Audit-CScore] : 0 [[id number]] : 0 [Change in mental status noted] : No change in mental status noted [Language] : denies difficulty with language [Behavior] : denies difficulty with behavior [Learning/Retaining New Information] : denies difficulty learning/retaining new information [Handling Complex Tasks] : denies difficulty handling complex tasks [Reasoning] : denies difficulty with reasoning [Spatial Ability and Orientation] : denies difficulty with spatial ability and orientation [None] : None [With Family] : lives with family [Retired] : retired [Sexually Active] : not sexually active [Feels Safe at Home] : Feels safe at home [Fully functional (bathing, dressing, toileting, transferring, walking, feeding)] : Fully functional (bathing, dressing, toileting, transferring, walking, feeding) [Fully functional (using the telephone, shopping, preparing meals, housekeeping, doing laundry, using] : Fully functional and needs no help or supervision to perform IADLs (using the telephone, shopping, preparing meals, housekeeping, doing laundry, using transportation, managing medications and managing finances) [Reports changes in hearing] : Reports no changes in hearing [Reports changes in vision] : Reports no changes in vision [Reports normal functional visual acuity (ie: able to read med bottle)] : Reports normal functional visual acuity [Reports changes in dental health] : Reports no changes in dental health [Smoke Detector] : smoke detector [Carbon Monoxide Detector] : carbon monoxide detector [Guns at Home] : no guns at home [Safety elements used in home] : safety elements used in home [Seat Belt] :  uses seat belt [Sunscreen] : uses sunscreen [Travel to Developing Areas] : does not  travel to developing areas [TB Exposure] : is not being exposed to tuberculosis [Caregiver Concerns] : does not have caregiver concerns [Reviewed no changes] : Reviewed, no changes [AdvancecareDate] : 09/22

## 2022-09-30 LAB
ALBUMIN SERPL ELPH-MCNC: 4.8 G/DL
ALP BLD-CCNC: 53 U/L
ALT SERPL-CCNC: 23 U/L
ANION GAP SERPL CALC-SCNC: 12 MMOL/L
AST SERPL-CCNC: 27 U/L
BASOPHILS # BLD AUTO: 0.08 K/UL
BASOPHILS NFR BLD AUTO: 0.9 %
BILIRUB SERPL-MCNC: 0.2 MG/DL
BUN SERPL-MCNC: 19 MG/DL
CALCIUM SERPL-MCNC: 10.3 MG/DL
CHLORIDE SERPL-SCNC: 102 MMOL/L
CHOLEST SERPL-MCNC: 179 MG/DL
CO2 SERPL-SCNC: 28 MMOL/L
CREAT SERPL-MCNC: 1.15 MG/DL
EGFR: 67 ML/MIN/1.73M2
EOSINOPHIL # BLD AUTO: 0.2 K/UL
EOSINOPHIL NFR BLD AUTO: 2.2 %
ESTIMATED AVERAGE GLUCOSE: 154 MG/DL
GLUCOSE SERPL-MCNC: 123 MG/DL
HBA1C MFR BLD HPLC: 7 %
HCT VFR BLD CALC: 47.2 %
HDLC SERPL-MCNC: 52 MG/DL
HGB BLD-MCNC: 14.5 G/DL
IMM GRANULOCYTES NFR BLD AUTO: 0.1 %
LDLC SERPL CALC-MCNC: 81 MG/DL
LYMPHOCYTES # BLD AUTO: 2.66 K/UL
LYMPHOCYTES NFR BLD AUTO: 28.9 %
MAGNESIUM SERPL-MCNC: 1.8 MG/DL
MAN DIFF?: NORMAL
MCHC RBC-ENTMCNC: 28.2 PG
MCHC RBC-ENTMCNC: 30.7 GM/DL
MCV RBC AUTO: 91.8 FL
MONOCYTES # BLD AUTO: 0.51 K/UL
MONOCYTES NFR BLD AUTO: 5.5 %
NEUTROPHILS # BLD AUTO: 5.76 K/UL
NEUTROPHILS NFR BLD AUTO: 62.4 %
NONHDLC SERPL-MCNC: 128 MG/DL
PLATELET # BLD AUTO: 269 K/UL
POTASSIUM SERPL-SCNC: 5.1 MMOL/L
PROT SERPL-MCNC: 7.5 G/DL
RBC # BLD: 5.14 M/UL
RBC # FLD: 13.1 %
SODIUM SERPL-SCNC: 142 MMOL/L
TRIGL SERPL-MCNC: 233 MG/DL
TSH SERPL-ACNC: 1.61 UIU/ML
WBC # FLD AUTO: 9.22 K/UL

## 2022-10-19 ENCOUNTER — APPOINTMENT (OUTPATIENT)
Dept: PHYSICAL MEDICINE AND REHAB | Facility: CLINIC | Age: 72
End: 2022-10-19

## 2022-10-19 PROCEDURE — 99214 OFFICE O/P EST MOD 30 MIN: CPT

## 2022-10-19 NOTE — HISTORY OF PRESENT ILLNESS
[FreeTextEntry1] : Patient returns after being seen in 2016.  He did have a series of 2 LESIs at that time with 60-70% improvement of his pain.  He is now complaining of bilateral lower back and buttock pain with radiation to the back of both thighs.  He has had episodes of sciatica on and off in the past.  He is here to discuss a repeat LESI.

## 2022-10-19 NOTE — DATA REVIEWED
[MRI] : MRI [FreeTextEntry1] : \par  MR Lumbar Spine No Cont             Final\par \par No Documents Attached\par \par \par \par \par   EXAM: [id number] - MR SPINE LUMBAR  - ORDERED BY: ANALIA RAMIREZ\par \par \par PROCEDURE DATE:  08/09/2022\par \par \par \par INTERPRETATION:  EXAMINATION: MR LUMBAR SPINE\par \par CLINICAL INDICATION: Lumbar radiculopathy. Pain and stiffness. Muscle cramping in the lower leg.\par \par TECHNIQUE: Multi-planar, multi-sequence MR of the lumbar spine was performed without intravenous contrast.\par \par COMPARISON: Lumbar spine MRI dated 8/13/2021\par \par INTERPRETATION:\par \par BONES AND BONE MARROW:  There is no evidence of fracture. There is mild endplate discogenic edema at L4-5.\par INTERVERTEBRAL DISCS:  There is a prominent Schmorl's node along the inferior endplate of L4. There is moderate to severe disc degeneration with loss of disc height at L3-4 through L5-S1 levels.\par ALIGNMENT:  The spinal alignment is maintained.\par CONUS AND CAUDA EQUINA: The conus medullaris is normal in size, signal and location.  The conus terminates at the L1 level.\par EXTRASPINAL: There is no epidural mass or fluid collection.  The paraspinal and included extraspinal soft tissues are unremarkable. There is a left renal midpole cyst.\par \par Evaluation of individual lumbar disc space levels demonstrates the following:\par \par L1/L2, There is no significant spinal canal or neural foraminal stenosis.\par L2/L3, broad-based disc bulge with mild facet arthropathy. Minimal bilateral foraminal narrowing. No central stenosis.\par L3/L4, broad-based disc bulge with moderate facet arthropathy. Moderate left and severe right neural foraminal stenosis. Prominence of the epidural fat with moderate central stenosis. Findings are similar to the previous MRI.\par L4/L5, broad-based disc bulge with moderate facet arthropathy with facet effusions. Severe bilateral neural foraminal stenosis. Prominence of the epidural fat with severe central stenosis. Findings are similar to the previous MRI.\par L5/S1, broad-based disc bulging with moderate facet arthropathy. Moderate/severe neural foraminal stenosis bilaterally. No central stenosis.\par \par IMPRESSION:\par Lumbar spondylosis with central canal and neural foraminal stenosis at L3-4 through L5-S1 levels as above. Findings are without significant interval change compared to the previous MRI. Findings are most severe at L4-5.\par \par --- End of Report ---\par \par \par \par \par \par \par SHLOMIT A GOLDBERG-STEIN MD; Attending Radiologist\par This document has been electronically signed. Aug 16 2022  7:09PM\par \par  \par \par  Ordered by: ANALIA RAMIREZ       Collected/Examined: [id number] 02:45PM       \par Verified by: ANALIA RAMIREZ [id number] 10:29AM       \par  Result Communication: Left message for patient;\par Stage: Final       \par  Performed at: [facility]       Resulted: [id number] 07:05PM       Last Updated: [id number] 10:29AM       Accession: [id number]

## 2022-10-19 NOTE — ASSESSMENT
[FreeTextEntry1] : 72 year old male with low back and lumbar radicular pain.  Given the nature of the patient's complaints and lack of significant improvement following more conservative measures, we did discuss lumbar epidural steroid injection.  Risks, benefits, and expectations of the procedure were reviewed.  The patient was provided with an educational pamphlet outlining the details of the procedure so that he/she may have the ability to review the information prior to proceeding.  The patient has agreed to proceed and will follow up with me for the procedure.  As he is taking Plavix, we have placed a call to Dr. Soto to discuss holding the medication for 7 days prior to the procedure.\par

## 2022-11-08 LAB — SARS-COV-2 N GENE NPH QL NAA+PROBE: NOT DETECTED

## 2022-11-10 ENCOUNTER — APPOINTMENT (OUTPATIENT)
Dept: PHYSICAL MEDICINE AND REHAB | Facility: CLINIC | Age: 72
End: 2022-11-10

## 2022-11-10 ENCOUNTER — OUTPATIENT (OUTPATIENT)
Dept: OUTPATIENT SERVICES | Facility: HOSPITAL | Age: 72
LOS: 1 days | End: 2022-11-10
Payer: MEDICARE

## 2022-11-10 DIAGNOSIS — M54.16 RADICULOPATHY, LUMBAR REGION: ICD-10-CM

## 2022-11-10 PROCEDURE — 62323 NJX INTERLAMINAR LMBR/SAC: CPT

## 2022-11-10 NOTE — PROCEDURE
[de-identified] : [facility]\par PAIN MANAGEMENT PROCEDURAL CENTER\par [address], [place], [address] - [phone]\par \par PATIENT: DEBBY ROSALES \par MEDICAL RECORD #: \par DATE OF OPERATION: 11/10/2022 \par PREOPERATIVE DIAGNOSIS:  LUMBAR RADICULAR PAIN\par POSTOPERATIVE DIAGNOSIS:  LUMBAR RADICULAR PAIN\par PROCEDURE: LUMBAR EPIDURAL STEROID INJECTION UNDER X-RAY GUIDANCE\par SURGEON:  CORY PETERSEN M.D.\par ANESTHEISA:  LOCAL\par EBL:  MINIMAL\par \par INDICATIONS:  The patient returns to Pain Management with persistent lower back pain with radiation down both legs.  The pain has remained quite significant and interferes with the patient’s ability to perform ADLs despite the implementation of other conservative measures.  I discussed with the patient at length the risks, benefits, and expectations of the aforementioned procedure.  All of the patient’s questions were answered.  The patient signed informed consent and agreed to proceed.\par \par PROCEDURE:  The patient was transported to the operating room, placed in the prone position and monitored non-invasively with stable vital signs and no complaints.  The patient’s back was prepped three times with betadine and draped in meticulous sterile fashion.  The L4-L5 interspace was identified fluoroscopically and the skin overlying this level was infiltrated with 5cc of 1% preservative-free lidocaine using a 25 gauge one inch needle.  The epidural space was approached and entered at this level with a 20 gauge Tuohy needle by loss of resistance technique.\par \par Following loss of resistance to air, aspiration was negative for CSF or heme.  Then, 2cc of Omnipaque 300 were injected and the epidurogram revealed spread from L4 to S1 in the posterior epidural space bilaterally with no distinct cutoff.  Depo-Medrol 80mg was then injected with 1cc of preservative-free 1% lidocaine.  The needle tract was flushed with 2cc of 1% lidocaine and the needle was removed.  A sterile bandage was applied.\par \par The patient tolerated the procedure well without complaint or complication.  The patient was observed in stable condition after the procedure for approximately 30 minutes before being discharged to home in the company of an adult.  The patient was provided with full written instructions.  The patient will be seen for follow-up in my office in 1 week but certainly sooner with any questions or concerns.\par \par \par \par Cory Petersen M.D.\par \par \par

## 2022-11-23 ENCOUNTER — APPOINTMENT (OUTPATIENT)
Dept: PHYSICAL MEDICINE AND REHAB | Facility: CLINIC | Age: 72
End: 2022-11-23

## 2022-11-23 DIAGNOSIS — M51.26 OTHER INTERVERTEBRAL DISC DISPLACEMENT, LUMBAR REGION: ICD-10-CM

## 2022-11-23 DIAGNOSIS — M51.36 OTHER INTERVERTEBRAL DISC DEGENERATION, LUMBAR REGION: ICD-10-CM

## 2022-11-23 PROCEDURE — 99213 OFFICE O/P EST LOW 20 MIN: CPT

## 2022-11-23 NOTE — HISTORY OF PRESENT ILLNESS
[FreeTextEntry1] : Patient returns after the first LESI this year a few weeks ago.  The patient reports 80% improvement in the symptoms.  The patient is pleased with the results and returns for a follow up visit today.  He still has some tightness in his lower back and tingling in both feet.\par

## 2022-11-23 NOTE — ASSESSMENT
[FreeTextEntry1] : 72 year old male with low back pain and lumbar radicular pain now significantly improved following his most recent injection.  He will continue with his home exercises and return to see me at the earliest sign that his pain returns for further injection therapy as necessary.

## 2022-12-05 ENCOUNTER — APPOINTMENT (OUTPATIENT)
Dept: UROLOGY | Facility: CLINIC | Age: 72
End: 2022-12-05

## 2022-12-05 PROCEDURE — 99214 OFFICE O/P EST MOD 30 MIN: CPT

## 2022-12-05 NOTE — ADDENDUM
[FreeTextEntry1] : Entered by Carlos Lizama, acting as scribe for Dr. Deondre Ramos.\par The documentation recorded by the scribe accurately reflects the service I personally performed and the decisions made by me.

## 2022-12-05 NOTE — LETTER BODY
[FreeTextEntry1] : Betty Madison MD\par [address],\[place] [address]\par [phone]\par \par Dear Dr. Lepe,\par \par Reason for Visit: BPH. Erectile dysfunction. Elevated PSA. Balanitis, resolved.\par \par This is a 72 year-old gentleman with history of stroke, mild left-sided hemiparesis, hypertension, and diabetes, presenting with BPH, balanitis, and erectile dysfunction. The patient underwent an unremarkable prostate MRI in June 2021 which demonstrated PI-RADS 2. The patient returns today for follow up. Since he was last seen, patient reports taking Flomax BID as directed without any difficulties or side effects. Strong urine flow but persistent nocturia. Patient has poorly controlled diabetes. His A1c is ranged from 7-8.0. All other review of systems are negative. His social and family history remain unchanged. Past medical history, family history and social history were inquired and were noncontributory to current condition.. Medications and allergies were reviewed. He has no known allergies to medication. \par \par On examination, the patient is a well  appearing gentleman in no acute distress. He is alert and oriented follows commands. He has normal mood and affect. He is normocephalic. Neck is supple. Oral no thrush Respirations are unlabored. His abdomen is soft and nontender. Bladder is nonpalpable. No CVA tenderness. Neurologically he is grossly intact. He has mild left sided weakness. No peripheral edema. Skin without gross abnormality. He has normal male external genitalia. Normal meatus. Bilateral testes are descended intrascrotally and normal to palpation. On rectal examination, there is normal sphincter tone. The prostate is clinically benign without focal induration or nodularity. He has mild balanitis. His foreskin is retractable.\par \par His previous PSA was 6.65, which is elevated but stable.\par \par ASSESSMENT: BPH. Erectile dysfunction. Elevated PSA, stable. Balanitis, resolved.\par \par I counseled the patient. In terms of his BPH, the patient reports strong urine flow on medical therapy. I recommended the patient continue Flomax BID. I renewed the patient's prescription for Flomax today. I encouraged the patient to continue medications regularly as directed. I encouraged fluid restriction. I recommended he improve glycemic control. In terms of his elevated PSA, his pervious PSA was 6.65 which is elevated but stable. His prostate MRI showed PI-RADS 2. I reassured the patient the risk of occult malignancy is low. I recommended he repeat PSA and BMP in a year to ensure stability. Risks and alternatives were discussed. I answered the patient questions. The patient will follow-up in 1 year and will contact me with any questions or concerns. Thank you for the opportunity to participate in the care of Mr. ROSALES. I will keep you updated on his progress. \par \par Plan: Continue Flomax BID. PSA. BMP. Follow up in 1 year.

## 2022-12-11 LAB
ANION GAP SERPL CALC-SCNC: 14 MMOL/L
BUN SERPL-MCNC: 23 MG/DL
CALCIUM SERPL-MCNC: 9 MG/DL
CHLORIDE SERPL-SCNC: 100 MMOL/L
CO2 SERPL-SCNC: 22 MMOL/L
CREAT SERPL-MCNC: 1.1 MG/DL
EGFR: 70 ML/MIN/1.73M2
GLUCOSE SERPL-MCNC: 171 MG/DL
POTASSIUM SERPL-SCNC: 5.5 MMOL/L
PSA FREE FLD-MCNC: 14 %
PSA FREE SERPL-MCNC: 1.23 NG/ML
PSA SERPL-MCNC: 8.5 NG/ML
SODIUM SERPL-SCNC: 136 MMOL/L

## 2023-01-05 ENCOUNTER — OUTPATIENT (OUTPATIENT)
Dept: OUTPATIENT SERVICES | Facility: HOSPITAL | Age: 73
LOS: 1 days | End: 2023-01-05
Payer: MEDICARE

## 2023-01-05 ENCOUNTER — RESULT REVIEW (OUTPATIENT)
Age: 73
End: 2023-01-05

## 2023-01-05 ENCOUNTER — APPOINTMENT (OUTPATIENT)
Dept: MRI IMAGING | Facility: IMAGING CENTER | Age: 73
End: 2023-01-05
Payer: MEDICARE

## 2023-01-05 DIAGNOSIS — R97.20 ELEVATED PROSTATE SPECIFIC ANTIGEN [PSA]: ICD-10-CM

## 2023-01-05 PROCEDURE — 72197 MRI PELVIS W/O & W/DYE: CPT | Mod: 26,MH

## 2023-01-05 PROCEDURE — 76498 UNLISTED MR PROCEDURE: CPT

## 2023-01-05 PROCEDURE — 72197 MRI PELVIS W/O & W/DYE: CPT

## 2023-01-05 PROCEDURE — 76498P: CUSTOM | Mod: 26,MH

## 2023-01-05 PROCEDURE — A9585: CPT

## 2023-01-11 ENCOUNTER — OUTPATIENT (OUTPATIENT)
Dept: OUTPATIENT SERVICES | Facility: HOSPITAL | Age: 73
LOS: 1 days | End: 2023-01-11
Payer: SELF-PAY

## 2023-01-11 DIAGNOSIS — Z00.8 ENCOUNTER FOR OTHER GENERAL EXAMINATION: ICD-10-CM

## 2023-01-11 PROCEDURE — C8001: CPT

## 2023-01-12 ENCOUNTER — NON-APPOINTMENT (OUTPATIENT)
Age: 73
End: 2023-01-12

## 2023-01-13 ENCOUNTER — NON-APPOINTMENT (OUTPATIENT)
Age: 73
End: 2023-01-13

## 2023-01-13 ENCOUNTER — APPOINTMENT (OUTPATIENT)
Dept: ORTHOPEDIC SURGERY | Facility: CLINIC | Age: 73
End: 2023-01-13
Payer: MEDICARE

## 2023-01-13 VITALS — WEIGHT: 150 LBS | BODY MASS INDEX: 24.99 KG/M2 | HEIGHT: 65 IN

## 2023-01-13 PROCEDURE — 99204 OFFICE O/P NEW MOD 45 MIN: CPT | Mod: 25

## 2023-01-13 PROCEDURE — 20610 DRAIN/INJ JOINT/BURSA W/O US: CPT | Mod: RT

## 2023-01-13 PROCEDURE — 99214 OFFICE O/P EST MOD 30 MIN: CPT | Mod: 25

## 2023-01-13 PROCEDURE — 73030 X-RAY EXAM OF SHOULDER: CPT | Mod: RT

## 2023-01-13 NOTE — HISTORY OF PRESENT ILLNESS
[de-identified] : 72-year-old male here for follow-up of right shoulder pain.  He was seen in the past several years ago he reports pain with overhead activity in the anterior posterior aspect of the shoulder.  He denies any falls or trauma he denies any numbness or tingling.  Injection several years ago with significant improvement\par \par The patient's past medical history, past surgical history, medications, allergies, and social history were reviewed by me today with the patient and documented accordingly. In addition, the patient's family history, which is noncontributory to this visit, was also reviewed.\par

## 2023-01-13 NOTE — PHYSICAL EXAM
[de-identified] : General Exam\par \par Well developed, well nourished\par No apparent distress\par Oriented to person, place, and time\par Mood: Normal\par Affect: Normal\par Balance and coordination: Normal\par Gait: Normal\par \par Right shoulder exam\par \par Inspection: No swelling, ecchymosis or gross deformity.\par Skin: No masses, No lesions\par Tenderness: No bicipital tenderness, no tenderness to the greater tuberosity/RTC insertion, no anterior shoulder/lesser tuberosity tenderness. No tenderness SC joint, clavicle, AC joint.\par ROM: 160/60/T6\par Impingement tests: Positive Greenberg\par AC Joint: no pain with cross arm testing\par Biceps: Negative speed\par Strength: 5/5 abduction, external rotation, and internal rotation\par Neuro: AIN, PIN, Ulnar nerve motor intact\par Sensation: Intact to light touch in radial, median, ulnar, and axillary nerve distributions\par Vasc: 2+ radial pulse\par  [de-identified] : \par The following radiographs were ordered and read by me during this patients visit. I reviewed each radiograph in detail with the patient and discussed the findings as highlighted below. \par \par 3 views of the right shoulder were obtained today.  The glenohumeral joint is well-maintained.  There is normal alignment no fracture

## 2023-01-13 NOTE — DISCUSSION/SUMMARY
[de-identified] : Right shoulder impingement and rotator cuff tendinopathy.  We discussed treatment options at length.  We discussed diagnostic options including MRI we discussed treatment options with medication physical therapy injections.  He requested a repeat injection today\par \par Injection: Right shoulder (Subacromial).\par Indication: Impingement and rotator cuff tendinitis.\par \par A discussion was had with the patient regarding this procedure and all questions were answered. All risks, benefits and alternatives were discussed. These included but were not limited to bleeding, infection, and allergic reaction. Alcohol was used to clean the skin, and betadine was used to sterilize and prep the area in the posterior aspect of the right shoulder. Ethyl chloride spray was then used as a topical anesthetic. A 21-gauge needle was used to inject 4cc of 1% lidocaine and 1cc of 40mg/ml methylprednisolone into the right subacromial space. A sterile bandage was then applied. The patient tolerated the procedure well and there were no complications. \par \par All questions answered follow-up as needed

## 2023-01-24 LAB
ALBUMIN SERPL ELPH-MCNC: 4.3 G/DL
ALP BLD-CCNC: 54 U/L
ALT SERPL-CCNC: 23 U/L
ANION GAP SERPL CALC-SCNC: 9 MMOL/L
AST SERPL-CCNC: 21 U/L
BASOPHILS # BLD AUTO: 0.06 K/UL
BASOPHILS NFR BLD AUTO: 0.7 %
BILIRUB SERPL-MCNC: 0.5 MG/DL
BUN SERPL-MCNC: 20 MG/DL
CALCIUM SERPL-MCNC: 9.5 MG/DL
CHLORIDE SERPL-SCNC: 101 MMOL/L
CHOLEST SERPL-MCNC: 152 MG/DL
CO2 SERPL-SCNC: 27 MMOL/L
CREAT SERPL-MCNC: 1.19 MG/DL
EGFR: 64 ML/MIN/1.73M2
EOSINOPHIL # BLD AUTO: 0.17 K/UL
EOSINOPHIL NFR BLD AUTO: 2.1 %
ESTIMATED AVERAGE GLUCOSE: 160 MG/DL
GLUCOSE SERPL-MCNC: 118 MG/DL
HBA1C MFR BLD HPLC: 7.2 %
HCT VFR BLD CALC: 44.2 %
HDLC SERPL-MCNC: 57 MG/DL
HGB BLD-MCNC: 14 G/DL
IMM GRANULOCYTES NFR BLD AUTO: 0.2 %
LDLC SERPL CALC-MCNC: 66 MG/DL
LYMPHOCYTES # BLD AUTO: 2.87 K/UL
LYMPHOCYTES NFR BLD AUTO: 34.7 %
MAN DIFF?: NORMAL
MCHC RBC-ENTMCNC: 28.1 PG
MCHC RBC-ENTMCNC: 31.7 GM/DL
MCV RBC AUTO: 88.8 FL
MONOCYTES # BLD AUTO: 0.6 K/UL
MONOCYTES NFR BLD AUTO: 7.3 %
NEUTROPHILS # BLD AUTO: 4.55 K/UL
NEUTROPHILS NFR BLD AUTO: 55 %
NONHDLC SERPL-MCNC: 95 MG/DL
PLATELET # BLD AUTO: 275 K/UL
POTASSIUM SERPL-SCNC: 5.1 MMOL/L
PROT SERPL-MCNC: 7.1 G/DL
PSA SERPL-MCNC: 13.8 NG/ML
RBC # BLD: 4.98 M/UL
RBC # FLD: 13 %
SODIUM SERPL-SCNC: 137 MMOL/L
TRIGL SERPL-MCNC: 147 MG/DL
TSH SERPL-ACNC: 2.1 UIU/ML
WBC # FLD AUTO: 8.27 K/UL

## 2023-01-26 ENCOUNTER — APPOINTMENT (OUTPATIENT)
Dept: INTERNAL MEDICINE | Facility: CLINIC | Age: 73
End: 2023-01-26
Payer: MEDICARE

## 2023-01-26 VITALS
HEIGHT: 65 IN | WEIGHT: 156 LBS | SYSTOLIC BLOOD PRESSURE: 165 MMHG | OXYGEN SATURATION: 97 % | HEART RATE: 102 BPM | BODY MASS INDEX: 25.99 KG/M2 | DIASTOLIC BLOOD PRESSURE: 65 MMHG | TEMPERATURE: 97.4 F

## 2023-01-26 VITALS
SYSTOLIC BLOOD PRESSURE: 138 MMHG | HEIGHT: 65 IN | BODY MASS INDEX: 25.99 KG/M2 | WEIGHT: 156 LBS | DIASTOLIC BLOOD PRESSURE: 72 MMHG

## 2023-01-26 DIAGNOSIS — E78.5 HYPERLIPIDEMIA, UNSPECIFIED: ICD-10-CM

## 2023-01-26 DIAGNOSIS — G81.94 HEMIPLEGIA, UNSPECIFIED AFFECTING LEFT NONDOMINANT SIDE: Chronic | ICD-10-CM

## 2023-01-26 PROCEDURE — 99214 OFFICE O/P EST MOD 30 MIN: CPT

## 2023-01-26 NOTE — HISTORY OF PRESENT ILLNESS
[FreeTextEntry1] : Follow-up diabetes, hypertension and hypercholesterolemia. [de-identified] : \par Diabetes: A1c had increased to above 8 last time; he reports compliance with medications; not always adherent to low-carb diet but he tries; trying to exercise and maintain healthy body weight as well; denies polyuria and polydipsia\par \par Hypertension: on medication, denies side effects; trying to limit salt intake; denies chest pains, palpitations, SOB \par \par Hypercholesterolemia: on statin, denies myalgias/muscle aches; denies chest pains

## 2023-01-26 NOTE — HEALTH RISK ASSESSMENT
[Never] : Never [No] : No [1 or 2 (0 pts)] : 1 or 2 (0 points) [Never (0 pts)] : Never (0 points) [No falls in past year] : Patient reported no falls in the past year [0] : 2) Feeling down, depressed, or hopeless: Not at all (0) [PHQ-2 Negative - No further assessment needed] : PHQ-2 Negative - No further assessment needed [Audit-CScore] : 0 [[id number]] : 0

## 2023-01-26 NOTE — ASSESSMENT
[FreeTextEntry1] : \par Diabetes: rechecked A1c; encouraged lifestyle change with low-carb diet and 30 mins aerobic exercise daily\par \par Hypertension: checked renal panel, electrolytes via blood draw; encouraged low-salt diet\par \par Hypercholesterolemia: checked fasting lipid panel; renewed statin medication\par \par

## 2023-02-01 ENCOUNTER — APPOINTMENT (OUTPATIENT)
Dept: ORTHOPEDIC SURGERY | Facility: CLINIC | Age: 73
End: 2023-02-01
Payer: MEDICARE

## 2023-02-01 VITALS — HEIGHT: 66 IN | BODY MASS INDEX: 25.07 KG/M2 | WEIGHT: 156 LBS

## 2023-02-01 DIAGNOSIS — M75.81 OTHER SHOULDER LESIONS, RIGHT SHOULDER: ICD-10-CM

## 2023-02-01 LAB — MAGNESIUM SERPL-MCNC: 1.9 MG/DL

## 2023-02-01 PROCEDURE — 99213 OFFICE O/P EST LOW 20 MIN: CPT

## 2023-02-01 NOTE — PHYSICAL EXAM
[de-identified] : Right shoulder exam\par \par Inspection: No swelling, ecchymosis or gross deformity.\par Skin: No masses, No lesions\par Tenderness: No bicipital tenderness, no tenderness to the greater tuberosity/RTC insertion, no anterior shoulder/lesser tuberosity tenderness. No tenderness SC joint, clavicle, AC joint.\par ROM: 160/60/T6\par Impingement tests: Positive Greenberg\par AC Joint: no pain with cross arm testing\par Biceps: Negative speed\par Strength: 5/5 abduction, external rotation, and internal rotation\par Neuro: AIN, PIN, Ulnar nerve motor intact\par Sensation: Intact to light touch in radial, median, ulnar, and axillary nerve distributions\par Vasc: 2+ radial pulse\par \par Neck with no midline or paraspinal tenderness.  Full range of motion.  Mild positive Spurling's.\par Negative Tinel's and Durkan's at the wrist

## 2023-02-01 NOTE — DISCUSSION/SUMMARY
[de-identified] : 72-year-old male previously seen for right shoulder pain.  His main complaint today is with numbness into the thumb and index finger of his hand this is been going on for 2 weeks he has full strength.  Recommend given his a period to resolve as the symptoms are relatively new and worse with activity like typing.  If it persists consider hand or neurology evaluation regarding the numbness and tingling.  All questions answered

## 2023-02-01 NOTE — HISTORY OF PRESENT ILLNESS
[de-identified] : 72-year-old male here for follow-up of right shoulder pain.  He was seen in the past several years ago he reports pain with overhead activity in the anterior posterior aspect of the shoulder.  He denies any falls or trauma he denies any numbness or tingling. Today, patient has additional complaints of numbness and tingling down the left arm for the past week. Patient states this sensation is made worse with typing and using his mouse on the computer. Rest makes the sensation going away. Patient admits to his hand feeling colder then usual and has decreased feeling in his thumb.

## 2023-02-05 DIAGNOSIS — N52.9 MALE ERECTILE DYSFUNCTION, UNSPECIFIED: ICD-10-CM

## 2023-02-06 ENCOUNTER — APPOINTMENT (OUTPATIENT)
Dept: UROLOGY | Facility: CLINIC | Age: 73
End: 2023-02-06
Payer: MEDICARE

## 2023-02-06 ENCOUNTER — OUTPATIENT (OUTPATIENT)
Dept: OUTPATIENT SERVICES | Facility: HOSPITAL | Age: 73
LOS: 1 days | End: 2023-02-06

## 2023-02-06 ENCOUNTER — INPATIENT (INPATIENT)
Facility: HOSPITAL | Age: 73
LOS: 2 days | Discharge: HOME | DRG: 641 | End: 2023-02-09
Attending: INTERNAL MEDICINE | Admitting: HOSPITALIST
Payer: MEDICARE

## 2023-02-06 VITALS
HEART RATE: 85 BPM | DIASTOLIC BLOOD PRESSURE: 82 MMHG | WEIGHT: 156.09 LBS | OXYGEN SATURATION: 96 % | RESPIRATION RATE: 18 BRPM | SYSTOLIC BLOOD PRESSURE: 176 MMHG | HEIGHT: 67 IN

## 2023-02-06 VITALS — HEART RATE: 118 BPM | SYSTOLIC BLOOD PRESSURE: 130 MMHG | DIASTOLIC BLOOD PRESSURE: 69 MMHG

## 2023-02-06 VITALS — HEART RATE: 89 BPM | DIASTOLIC BLOOD PRESSURE: 82 MMHG | SYSTOLIC BLOOD PRESSURE: 150 MMHG

## 2023-02-06 DIAGNOSIS — R35.0 FREQUENCY OF MICTURITION: ICD-10-CM

## 2023-02-06 LAB
ALBUMIN SERPL ELPH-MCNC: 3.8 G/DL — SIGNIFICANT CHANGE UP (ref 3.3–5)
ALP SERPL-CCNC: 51 U/L — SIGNIFICANT CHANGE UP (ref 40–120)
ALT FLD-CCNC: 22 U/L — SIGNIFICANT CHANGE UP (ref 10–45)
ANION GAP SERPL CALC-SCNC: 13 MMOL/L — SIGNIFICANT CHANGE UP (ref 5–17)
APPEARANCE UR: CLEAR — SIGNIFICANT CHANGE UP
AST SERPL-CCNC: 28 U/L — SIGNIFICANT CHANGE UP (ref 10–40)
BACTERIA # UR AUTO: NEGATIVE — SIGNIFICANT CHANGE UP
BASE EXCESS BLDV CALC-SCNC: -2.9 MMOL/L — LOW (ref -2–3)
BASOPHILS # BLD AUTO: 0.03 K/UL — SIGNIFICANT CHANGE UP (ref 0–0.2)
BASOPHILS NFR BLD AUTO: 0.2 % — SIGNIFICANT CHANGE UP (ref 0–2)
BILIRUB SERPL-MCNC: 0.8 MG/DL — SIGNIFICANT CHANGE UP (ref 0.2–1.2)
BILIRUB UR-MCNC: NEGATIVE — SIGNIFICANT CHANGE UP
BLD GP AB SCN SERPL QL: NEGATIVE — SIGNIFICANT CHANGE UP
BUN SERPL-MCNC: 18 MG/DL — SIGNIFICANT CHANGE UP (ref 7–23)
CA-I SERPL-SCNC: 1.15 MMOL/L — SIGNIFICANT CHANGE UP (ref 1.15–1.33)
CALCIUM SERPL-MCNC: 8.6 MG/DL — SIGNIFICANT CHANGE UP (ref 8.4–10.5)
CHLORIDE BLDV-SCNC: 85 MMOL/L — LOW (ref 96–108)
CHLORIDE SERPL-SCNC: 85 MMOL/L — LOW (ref 96–108)
CO2 BLDV-SCNC: 23 MMOL/L — SIGNIFICANT CHANGE UP (ref 22–26)
CO2 SERPL-SCNC: 19 MMOL/L — LOW (ref 22–31)
COLOR SPEC: COLORLESS — SIGNIFICANT CHANGE UP
CREAT SERPL-MCNC: 0.89 MG/DL — SIGNIFICANT CHANGE UP (ref 0.5–1.3)
DIFF PNL FLD: ABNORMAL
EGFR: 90 ML/MIN/1.73M2 — SIGNIFICANT CHANGE UP
EOSINOPHIL # BLD AUTO: 0.07 K/UL — SIGNIFICANT CHANGE UP (ref 0–0.5)
EOSINOPHIL NFR BLD AUTO: 0.5 % — SIGNIFICANT CHANGE UP (ref 0–6)
EPI CELLS # UR: 0 /HPF — SIGNIFICANT CHANGE UP
GAS PNL BLDV: 115 MMOL/L — CRITICAL LOW (ref 136–145)
GAS PNL BLDV: SIGNIFICANT CHANGE UP
GLUCOSE BLDV-MCNC: 228 MG/DL — HIGH (ref 70–99)
GLUCOSE SERPL-MCNC: 222 MG/DL — HIGH (ref 70–99)
GLUCOSE UR QL: ABNORMAL
HCO3 BLDV-SCNC: 22 MMOL/L — SIGNIFICANT CHANGE UP (ref 22–29)
HCT VFR BLD CALC: 37 % — LOW (ref 39–50)
HCT VFR BLDA CALC: 40 % — SIGNIFICANT CHANGE UP (ref 39–51)
HGB BLD CALC-MCNC: 13.3 G/DL — SIGNIFICANT CHANGE UP (ref 12.6–17.4)
HGB BLD-MCNC: 12.9 G/DL — LOW (ref 13–17)
HYALINE CASTS # UR AUTO: 1 /LPF — SIGNIFICANT CHANGE UP (ref 0–2)
IMM GRANULOCYTES NFR BLD AUTO: 0.7 % — SIGNIFICANT CHANGE UP (ref 0–0.9)
INR BLD: 1.02 RATIO — SIGNIFICANT CHANGE UP (ref 0.88–1.16)
KETONES UR-MCNC: SIGNIFICANT CHANGE UP
LACTATE BLDV-MCNC: 2.2 MMOL/L — HIGH (ref 0.5–2)
LEUKOCYTE ESTERASE UR-ACNC: ABNORMAL
LIDOCAIN IGE QN: 55 U/L — SIGNIFICANT CHANGE UP (ref 7–60)
LYMPHOCYTES # BLD AUTO: 1.45 K/UL — SIGNIFICANT CHANGE UP (ref 1–3.3)
LYMPHOCYTES # BLD AUTO: 10 % — LOW (ref 13–44)
MCHC RBC-ENTMCNC: 28.4 PG — SIGNIFICANT CHANGE UP (ref 27–34)
MCHC RBC-ENTMCNC: 34.9 GM/DL — SIGNIFICANT CHANGE UP (ref 32–36)
MCV RBC AUTO: 81.5 FL — SIGNIFICANT CHANGE UP (ref 80–100)
MONOCYTES # BLD AUTO: 0.79 K/UL — SIGNIFICANT CHANGE UP (ref 0–0.9)
MONOCYTES NFR BLD AUTO: 5.4 % — SIGNIFICANT CHANGE UP (ref 2–14)
NEUTROPHILS # BLD AUTO: 12.07 K/UL — HIGH (ref 1.8–7.4)
NEUTROPHILS NFR BLD AUTO: 83.2 % — HIGH (ref 43–77)
NITRITE UR-MCNC: NEGATIVE — SIGNIFICANT CHANGE UP
NRBC # BLD: 0 /100 WBCS — SIGNIFICANT CHANGE UP (ref 0–0)
PCO2 BLDV: 38 MMHG — LOW (ref 42–55)
PH BLDV: 7.37 — SIGNIFICANT CHANGE UP (ref 7.32–7.43)
PH UR: 6 — SIGNIFICANT CHANGE UP (ref 5–8)
PLATELET # BLD AUTO: 219 K/UL — SIGNIFICANT CHANGE UP (ref 150–400)
PO2 BLDV: 48 MMHG — HIGH (ref 25–45)
POTASSIUM BLDV-SCNC: 4 MMOL/L — SIGNIFICANT CHANGE UP (ref 3.5–5.1)
POTASSIUM SERPL-MCNC: 4 MMOL/L — SIGNIFICANT CHANGE UP (ref 3.5–5.3)
POTASSIUM SERPL-SCNC: 4 MMOL/L — SIGNIFICANT CHANGE UP (ref 3.5–5.3)
PROT SERPL-MCNC: 6.4 G/DL — SIGNIFICANT CHANGE UP (ref 6–8.3)
PROT UR-MCNC: ABNORMAL
PROTHROM AB SERPL-ACNC: 11.7 SEC — SIGNIFICANT CHANGE UP (ref 10.5–13.4)
RBC # BLD: 4.54 M/UL — SIGNIFICANT CHANGE UP (ref 4.2–5.8)
RBC # FLD: 12.1 % — SIGNIFICANT CHANGE UP (ref 10.3–14.5)
RBC CASTS # UR COMP ASSIST: 717 /HPF — HIGH (ref 0–4)
RH IG SCN BLD-IMP: POSITIVE — SIGNIFICANT CHANGE UP
RH IG SCN BLD-IMP: POSITIVE — SIGNIFICANT CHANGE UP
SAO2 % BLDV: 84 % — SIGNIFICANT CHANGE UP (ref 67–88)
SODIUM SERPL-SCNC: 117 MMOL/L — CRITICAL LOW (ref 135–145)
SP GR SPEC: 1.02 — SIGNIFICANT CHANGE UP (ref 1.01–1.02)
UROBILINOGEN FLD QL: NEGATIVE — SIGNIFICANT CHANGE UP
WBC # BLD: 14.51 K/UL — HIGH (ref 3.8–10.5)
WBC # FLD AUTO: 14.51 K/UL — HIGH (ref 3.8–10.5)
WBC UR QL: 9 /HPF — HIGH (ref 0–5)

## 2023-02-06 PROCEDURE — 55700: CPT | Mod: 22

## 2023-02-06 PROCEDURE — 76377 3D RENDER W/INTRP POSTPROCES: CPT | Mod: 26

## 2023-02-06 PROCEDURE — 76942 ECHO GUIDE FOR BIOPSY: CPT | Mod: 26,59

## 2023-02-06 PROCEDURE — 99285 EMERGENCY DEPT VISIT HI MDM: CPT | Mod: GC

## 2023-02-06 RX ORDER — ONDANSETRON 8 MG/1
4 TABLET, FILM COATED ORAL ONCE
Refills: 0 | Status: COMPLETED | OUTPATIENT
Start: 2023-02-06 | End: 2023-02-06

## 2023-02-06 RX ORDER — SODIUM CHLORIDE 9 MG/ML
1000 INJECTION INTRAMUSCULAR; INTRAVENOUS; SUBCUTANEOUS ONCE
Refills: 0 | Status: COMPLETED | OUTPATIENT
Start: 2023-02-06 | End: 2023-02-06

## 2023-02-06 RX ORDER — ACETAMINOPHEN 500 MG
1000 TABLET ORAL ONCE
Refills: 0 | Status: COMPLETED | OUTPATIENT
Start: 2023-02-06 | End: 2023-02-06

## 2023-02-06 RX ORDER — SODIUM CHLORIDE 9 MG/ML
1000 INJECTION INTRAMUSCULAR; INTRAVENOUS; SUBCUTANEOUS ONCE
Refills: 0 | Status: DISCONTINUED | OUTPATIENT
Start: 2023-02-06 | End: 2023-02-06

## 2023-02-06 RX ORDER — CIPROFLOXACIN HYDROCHLORIDE 500 MG/1
500 TABLET, FILM COATED ORAL
Refills: 0 | Status: COMPLETED | OUTPATIENT
Start: 2023-02-06

## 2023-02-06 RX ADMIN — CIPROFLOXACIN 0 MG: 500 TABLET, FILM COATED ORAL at 00:00

## 2023-02-06 RX ADMIN — Medication 400 MILLIGRAM(S): at 21:43

## 2023-02-06 RX ADMIN — ONDANSETRON 4 MILLIGRAM(S): 8 TABLET, FILM COATED ORAL at 21:42

## 2023-02-06 RX ADMIN — SODIUM CHLORIDE 1000 MILLILITER(S): 9 INJECTION INTRAMUSCULAR; INTRAVENOUS; SUBCUTANEOUS at 21:42

## 2023-02-06 NOTE — ED PROVIDER NOTE - NS ED ROS FT
GENERAL: +chills   EYES: No change in vision  HEENT: No trouble swallowing or speaking  CARDIAC: No chest pain  PULMONARY: No cough or SOB  GI: No nausea, vomiting   : No changes in urination  SKIN: No rashes  NEURO: No headache, no numbness  MSK: No joint pain

## 2023-02-06 NOTE — ED PROVIDER NOTE - NSICDXPASTMEDICALHX_GEN_ALL_CORE_FT
PAST MEDICAL HISTORY:  BPH without urinary obstruction     CVA (cerebrovascular accident) 2014    DM (diabetes mellitus) type II    HLD (hyperlipidemia)     HTN (hypertension)     Spinal stenosis, lumbosacral region

## 2023-02-06 NOTE — ED PROVIDER NOTE - PROGRESS NOTE DETAILS
Attending Ramon:  pt required meds for ct scan as has allergy to iv dye, reviewed ct did not see gross abnormality, seen on my read, discussed w/ rads resident who also did not see anything acute.

## 2023-02-06 NOTE — ED ADULT NURSE NOTE - ED STAT RN HANDOFF DETAILS 2
hand off given to oncoming RN's Francy Navarrete and Avril Naranjo. Pt continues to await bed upstairs

## 2023-02-06 NOTE — ED PROVIDER NOTE - OBJECTIVE STATEMENT
72-year-old male history of diabetes, hypertension, hyperlipidemia, BPH presenting with episode of vomiting and chills.  Patient states he had a prostate biopsy done today for BPH by Dr. Ramos of [facility].  States prior to arrival he felt diaphoretic, vomited twice.  Denies any blood in the vomit. Endorsing one episode of blood tinged stool in morning. Denies any chest pain, shortness of breath, abdominal pain.  Denies taking any medications prior to arrival. States he was prescribed Cipro to take after the procedure but has not taken anything yet. Denies any medications OTC. Denies any sick contact or recent travel. 72-year-old male history of diabetes, hypertension, hyperlipidemia, BPH presenting with episode of vomiting and chills.  Patient states he had a prostate biopsy done today for BPH by Dr. Rmaos of [facility].  States prior to arrival he felt diaphoretic, vomited twice.  Denies any blood in the vomit. Endorsing one episode of blood tinged stool in morning. Wife states that she believes patient has had difficulty with word finding and completing sentences today - but denies any focal neurological deficits. Denies any chest pain, shortness of breath, abdominal pain.  Denies taking any medications prior to arrival. States he was prescribed Cipro to take after the procedure but has not taken anything yet. Denies any medications OTC. Denies any sick contact or recent travel. 72-year-old male history of diabetes, hypertension, hyperlipidemia, BPH presenting with episode of vomiting and chills.  Patient states he had a prostate biopsy done today for BPH by Dr. Deondre Ramos of [facility].  States prior to arrival he felt diaphoretic, vomited twice.  Denies any blood in the vomit. Endorsing one episode of blood tinged stool in morning. Wife states that she believes patient has had difficulty with word finding and completing sentences today - but denies any focal neurological deficits. Denies any chest pain, shortness of breath, abdominal pain.  Denies taking any medications prior to arrival. States he was prescribed Cipro to take after the procedure but has not taken anything yet. Denies any medications OTC. Denies any sick contact or recent travel.

## 2023-02-06 NOTE — ED PROVIDER NOTE - CHIEF COMPLAINT
The patient is a 72y Male complaining of  The patient is a 72y Male complaining of vomiting and chills

## 2023-02-06 NOTE — ED PROVIDER NOTE - CLINICAL SUMMARY MEDICAL DECISION MAKING FREE TEXT BOX
72M w prostate cancer s/p biopsy today p/w chills, AMS, nausea, vomiting, blood in stool x 1 after procedure. No cough, runny nose, rash. On exam, aox2, nontoxic appearing. CVS: tachycardic. Lungs CTAB. Abd soft and nontender. Differentials include but are not limited to: sepsis, hematoma, post op pain, electrolyte abnormalities, UTI. Will evaluate w sepsis workup, CT a/p Statement Selected

## 2023-02-06 NOTE — ED ADULT NURSE NOTE - NSIMPLEMENTINTERV_GEN_ALL_ED
Implemented All Fall with Harm Risk Interventions:  [place] to call system. Call bell, personal items and telephone within reach. Instruct patient to call for assistance. Room bathroom lighting operational. Non-slip footwear when patient is off stretcher. Physically safe environment: no spills, clutter or unnecessary equipment. Stretcher in lowest position, wheels locked, appropriate side rails in place. Provide visual cue, wrist band, yellow gown, etc. Monitor gait and stability. Monitor for mental status changes and reorient to person, place, and time. Review medications for side effects contributing to fall risk. Reinforce activity limits and safety measures with patient and family. Provide visual clues: red socks.

## 2023-02-06 NOTE — ED ADULT NURSE NOTE - OBJECTIVE STATEMENT
73 y/o male PMH DM, HTN, HLD, BPH, prostate cancer presents to ED from home via EMS c/o diaphoresis, n/v since 5 pm. Pt had prostate biopsy this morning. States he was prescribed Cipro to take after the procedure but has not taken anything yet. Around 5pm per wife, pt began feeling nauseous, vomiting, sweating. She also states he wasn't answering her questions appropriately, "was having difficulty word finding." Upon arrival, reporting SOB and nausea. Denying chest pain, abdominal pain. Pt is A&O x 3, slow to answer. Breathing tachynpenic - placed on 2L for comfort as pt states he has SOB (saturating well on room air).+significant diaphoresis. Arrived with reed to leg bag - changed to larger drainage bag. UA and culture sent to lab. Sinus tach on CM. Gross motor and neuro intact. 20G iV placed in L wrist by EMS, 20G arrow placed in R upper arm by resident. Safety and comfort provided. Call bell within reach.

## 2023-02-06 NOTE — ED PROVIDER NOTE - ATTENDING CONTRIBUTION TO CARE
I, Joey Godfrey, performed a history and physical exam of the patient and discussed their management with the resident, student, and/or fellow. I reviewed the note and agree with the documented findings and plan of care. I was present and available for all procedures.

## 2023-02-07 ENCOUNTER — NON-APPOINTMENT (OUTPATIENT)
Age: 73
End: 2023-02-07

## 2023-02-07 DIAGNOSIS — I10 ESSENTIAL (PRIMARY) HYPERTENSION: ICD-10-CM

## 2023-02-07 DIAGNOSIS — E87.1 HYPO-OSMOLALITY AND HYPONATREMIA: ICD-10-CM

## 2023-02-07 DIAGNOSIS — A41.9 SEPSIS, UNSPECIFIED ORGANISM: ICD-10-CM

## 2023-02-07 DIAGNOSIS — R11.2 NAUSEA WITH VOMITING, UNSPECIFIED: ICD-10-CM

## 2023-02-07 DIAGNOSIS — R97.20 ELEVATED PROSTATE SPECIFIC ANTIGEN [PSA]: ICD-10-CM

## 2023-02-07 DIAGNOSIS — Z29.9 ENCOUNTER FOR PROPHYLACTIC MEASURES, UNSPECIFIED: ICD-10-CM

## 2023-02-07 DIAGNOSIS — E11.9 TYPE 2 DIABETES MELLITUS WITHOUT COMPLICATIONS: ICD-10-CM

## 2023-02-07 LAB
ANION GAP SERPL CALC-SCNC: 12 MMOL/L — SIGNIFICANT CHANGE UP (ref 5–17)
ANION GAP SERPL CALC-SCNC: 12 MMOL/L — SIGNIFICANT CHANGE UP (ref 5–17)
BASE EXCESS BLDV CALC-SCNC: -4.8 MMOL/L — LOW (ref -2–3)
BUN SERPL-MCNC: 14 MG/DL — SIGNIFICANT CHANGE UP (ref 7–23)
BUN SERPL-MCNC: 16 MG/DL — SIGNIFICANT CHANGE UP (ref 7–23)
CA-I SERPL-SCNC: 1.09 MMOL/L — LOW (ref 1.15–1.33)
CALCIUM SERPL-MCNC: 8.1 MG/DL — LOW (ref 8.4–10.5)
CALCIUM SERPL-MCNC: 9 MG/DL — SIGNIFICANT CHANGE UP (ref 8.4–10.5)
CHLORIDE BLDV-SCNC: 88 MMOL/L — LOW (ref 96–108)
CHLORIDE SERPL-SCNC: 88 MMOL/L — LOW (ref 96–108)
CHLORIDE SERPL-SCNC: 97 MMOL/L — SIGNIFICANT CHANGE UP (ref 96–108)
CO2 BLDV-SCNC: 22 MMOL/L — SIGNIFICANT CHANGE UP (ref 22–26)
CO2 SERPL-SCNC: 19 MMOL/L — LOW (ref 22–31)
CO2 SERPL-SCNC: 21 MMOL/L — LOW (ref 22–31)
CREAT ?TM UR-MCNC: 9 MG/DL — SIGNIFICANT CHANGE UP
CREAT SERPL-MCNC: 0.87 MG/DL — SIGNIFICANT CHANGE UP (ref 0.5–1.3)
CREAT SERPL-MCNC: 0.94 MG/DL — SIGNIFICANT CHANGE UP (ref 0.5–1.3)
EGFR: 85 ML/MIN/1.73M2 — SIGNIFICANT CHANGE UP
EGFR: 91 ML/MIN/1.73M2 — SIGNIFICANT CHANGE UP
FLUAV AG NPH QL: SIGNIFICANT CHANGE UP
FLUBV AG NPH QL: SIGNIFICANT CHANGE UP
GAS PNL BLDV: 116 MMOL/L — CRITICAL LOW (ref 136–145)
GAS PNL BLDV: SIGNIFICANT CHANGE UP
GAS PNL BLDV: SIGNIFICANT CHANGE UP
GLUCOSE BLDC GLUCOMTR-MCNC: 171 MG/DL — HIGH (ref 70–99)
GLUCOSE BLDC GLUCOMTR-MCNC: 198 MG/DL — HIGH (ref 70–99)
GLUCOSE BLDC GLUCOMTR-MCNC: 203 MG/DL — HIGH (ref 70–99)
GLUCOSE BLDV-MCNC: 173 MG/DL — HIGH (ref 70–99)
GLUCOSE SERPL-MCNC: 176 MG/DL — HIGH (ref 70–99)
GLUCOSE SERPL-MCNC: 224 MG/DL — HIGH (ref 70–99)
HCO3 BLDV-SCNC: 20 MMOL/L — LOW (ref 22–29)
HCT VFR BLDA CALC: 39 % — SIGNIFICANT CHANGE UP (ref 39–51)
HGB BLD CALC-MCNC: 12.9 G/DL — SIGNIFICANT CHANGE UP (ref 12.6–17.4)
LACTATE BLDV-MCNC: 1.2 MMOL/L — SIGNIFICANT CHANGE UP (ref 0.5–2)
OSMOLALITY SERPL: 313 MOSMOL/KG — HIGH (ref 280–301)
OSMOLALITY UR: 146 MOS/KG — LOW (ref 300–900)
PCO2 BLDV: 38 MMHG — LOW (ref 42–55)
PH BLDV: 7.34 — SIGNIFICANT CHANGE UP (ref 7.32–7.43)
PO2 BLDV: 59 MMHG — HIGH (ref 25–45)
POTASSIUM BLDV-SCNC: 3.8 MMOL/L — SIGNIFICANT CHANGE UP (ref 3.5–5.1)
POTASSIUM SERPL-MCNC: 3.9 MMOL/L — SIGNIFICANT CHANGE UP (ref 3.5–5.3)
POTASSIUM SERPL-MCNC: 4.2 MMOL/L — SIGNIFICANT CHANGE UP (ref 3.5–5.3)
POTASSIUM SERPL-SCNC: 3.9 MMOL/L — SIGNIFICANT CHANGE UP (ref 3.5–5.3)
POTASSIUM SERPL-SCNC: 4.2 MMOL/L — SIGNIFICANT CHANGE UP (ref 3.5–5.3)
POTASSIUM UR-SCNC: 8 MMOL/L — SIGNIFICANT CHANGE UP
PROT ?TM UR-MCNC: <7 MG/DL — SIGNIFICANT CHANGE UP (ref 0–12)
PROT/CREAT UR-RTO: SIGNIFICANT CHANGE UP (ref 0–0.2)
RSV RNA NPH QL NAA+NON-PROBE: SIGNIFICANT CHANGE UP
SAO2 % BLDV: 91.3 % — HIGH (ref 67–88)
SARS-COV-2 RNA SPEC QL NAA+PROBE: SIGNIFICANT CHANGE UP
SODIUM SERPL-SCNC: 119 MMOL/L — CRITICAL LOW (ref 135–145)
SODIUM SERPL-SCNC: 130 MMOL/L — LOW (ref 135–145)
SODIUM UR-SCNC: 24 MMOL/L — SIGNIFICANT CHANGE UP
TROPONIN T, HIGH SENSITIVITY RESULT: 58 NG/L — HIGH (ref 0–51)
UUN UR-MCNC: 105 MG/DL — SIGNIFICANT CHANGE UP

## 2023-02-07 PROCEDURE — 74177 CT ABD & PELVIS W/CONTRAST: CPT | Mod: 26,MA

## 2023-02-07 PROCEDURE — 99222 1ST HOSP IP/OBS MODERATE 55: CPT | Mod: GC

## 2023-02-07 PROCEDURE — 71275 CT ANGIOGRAPHY CHEST: CPT | Mod: 26,MA

## 2023-02-07 PROCEDURE — 99223 1ST HOSP IP/OBS HIGH 75: CPT | Mod: GC,AI

## 2023-02-07 PROCEDURE — 70450 CT HEAD/BRAIN W/O DYE: CPT | Mod: 26,MA

## 2023-02-07 RX ORDER — DEXTROSE 50 % IN WATER 50 %
12.5 SYRINGE (ML) INTRAVENOUS ONCE
Refills: 0 | Status: DISCONTINUED | OUTPATIENT
Start: 2023-02-07 | End: 2023-02-09

## 2023-02-07 RX ORDER — METFORMIN HYDROCHLORIDE 850 MG/1
1 TABLET ORAL
Qty: 0 | Refills: 0 | DISCHARGE

## 2023-02-07 RX ORDER — LISINOPRIL 2.5 MG/1
1 TABLET ORAL
Qty: 0 | Refills: 0 | DISCHARGE

## 2023-02-07 RX ORDER — TAMSULOSIN HYDROCHLORIDE 0.4 MG/1
1 CAPSULE ORAL
Qty: 0 | Refills: 0 | DISCHARGE

## 2023-02-07 RX ORDER — SODIUM CHLORIDE 9 MG/ML
1000 INJECTION, SOLUTION INTRAVENOUS
Refills: 0 | Status: DISCONTINUED | OUTPATIENT
Start: 2023-02-07 | End: 2023-02-09

## 2023-02-07 RX ORDER — PIPERACILLIN AND TAZOBACTAM 4; .5 G/20ML; G/20ML
3.38 INJECTION, POWDER, LYOPHILIZED, FOR SOLUTION INTRAVENOUS ONCE
Refills: 0 | Status: COMPLETED | OUTPATIENT
Start: 2023-02-07 | End: 2023-02-07

## 2023-02-07 RX ORDER — SODIUM CHLORIDE 9 MG/ML
1000 INJECTION, SOLUTION INTRAVENOUS
Refills: 0 | Status: DISCONTINUED | OUTPATIENT
Start: 2023-02-07 | End: 2023-02-08

## 2023-02-07 RX ORDER — LISINOPRIL 2.5 MG/1
40 TABLET ORAL DAILY
Refills: 0 | Status: DISCONTINUED | OUTPATIENT
Start: 2023-02-07 | End: 2023-02-09

## 2023-02-07 RX ORDER — MEROPENEM 1 G/30ML
1000 INJECTION INTRAVENOUS EVERY 8 HOURS
Refills: 0 | Status: DISCONTINUED | OUTPATIENT
Start: 2023-02-07 | End: 2023-02-09

## 2023-02-07 RX ORDER — UBIDECARENONE 100 MG
1 CAPSULE ORAL
Qty: 0 | Refills: 0 | DISCHARGE

## 2023-02-07 RX ORDER — CLOTRIMAZOLE AND BETAMETHASONE DIPROPIONATE 10; .5 MG/G; MG/G
1 CREAM TOPICAL
Qty: 0 | Refills: 0 | DISCHARGE

## 2023-02-07 RX ORDER — INSULIN LISPRO 100/ML
VIAL (ML) SUBCUTANEOUS
Refills: 0 | Status: DISCONTINUED | OUTPATIENT
Start: 2023-02-07 | End: 2023-02-09

## 2023-02-07 RX ORDER — DEXTROSE 50 % IN WATER 50 %
25 SYRINGE (ML) INTRAVENOUS ONCE
Refills: 0 | Status: DISCONTINUED | OUTPATIENT
Start: 2023-02-07 | End: 2023-02-09

## 2023-02-07 RX ORDER — MEROPENEM 1 G/30ML
1000 INJECTION INTRAVENOUS ONCE
Refills: 0 | Status: COMPLETED | OUTPATIENT
Start: 2023-02-07 | End: 2023-02-07

## 2023-02-07 RX ORDER — INSULIN LISPRO 100/ML
VIAL (ML) SUBCUTANEOUS AT BEDTIME
Refills: 0 | Status: DISCONTINUED | OUTPATIENT
Start: 2023-02-07 | End: 2023-02-09

## 2023-02-07 RX ORDER — VANCOMYCIN HCL 1 G
1000 VIAL (EA) INTRAVENOUS ONCE
Refills: 0 | Status: COMPLETED | OUTPATIENT
Start: 2023-02-07 | End: 2023-02-07

## 2023-02-07 RX ORDER — IPRATROPIUM BROMIDE 21 MCG
2 AEROSOL, SPRAY (ML) NASAL
Qty: 0 | Refills: 0 | DISCHARGE

## 2023-02-07 RX ORDER — DEXTROSE 50 % IN WATER 50 %
15 SYRINGE (ML) INTRAVENOUS ONCE
Refills: 0 | Status: DISCONTINUED | OUTPATIENT
Start: 2023-02-07 | End: 2023-02-09

## 2023-02-07 RX ORDER — GLUCAGON INJECTION, SOLUTION 0.5 MG/.1ML
1 INJECTION, SOLUTION SUBCUTANEOUS ONCE
Refills: 0 | Status: DISCONTINUED | OUTPATIENT
Start: 2023-02-07 | End: 2023-02-09

## 2023-02-07 RX ORDER — MEROPENEM 1 G/30ML
INJECTION INTRAVENOUS
Refills: 0 | Status: DISCONTINUED | OUTPATIENT
Start: 2023-02-07 | End: 2023-02-09

## 2023-02-07 RX ORDER — MEROPENEM 1 G/30ML
INJECTION INTRAVENOUS
Refills: 0 | Status: DISCONTINUED | OUTPATIENT
Start: 2023-02-07 | End: 2023-02-07

## 2023-02-07 RX ORDER — KETOCONAZOLE 20 MG/G
1 AEROSOL, FOAM TOPICAL
Qty: 0 | Refills: 0 | DISCHARGE

## 2023-02-07 RX ORDER — DIPHENHYDRAMINE HCL 50 MG
50 CAPSULE ORAL ONCE
Refills: 0 | Status: COMPLETED | OUTPATIENT
Start: 2023-02-07 | End: 2023-02-07

## 2023-02-07 RX ORDER — CLOPIDOGREL BISULFATE 75 MG/1
1 TABLET, FILM COATED ORAL
Qty: 0 | Refills: 0 | DISCHARGE

## 2023-02-07 RX ORDER — DIPHENHYDRAMINE HCL 50 MG
25 CAPSULE ORAL ONCE
Refills: 0 | Status: COMPLETED | OUTPATIENT
Start: 2023-02-07 | End: 2023-02-07

## 2023-02-07 RX ORDER — ATORVASTATIN CALCIUM 80 MG/1
40 TABLET, FILM COATED ORAL AT BEDTIME
Refills: 0 | Status: DISCONTINUED | OUTPATIENT
Start: 2023-02-07 | End: 2023-02-09

## 2023-02-07 RX ORDER — CLOPIDOGREL BISULFATE 75 MG/1
75 TABLET, FILM COATED ORAL DAILY
Refills: 0 | Status: DISCONTINUED | OUTPATIENT
Start: 2023-02-07 | End: 2023-02-09

## 2023-02-07 RX ADMIN — Medication 2: at 18:03

## 2023-02-07 RX ADMIN — MEROPENEM 100 MILLIGRAM(S): 1 INJECTION INTRAVENOUS at 12:19

## 2023-02-07 RX ADMIN — Medication 250 MILLIGRAM(S): at 08:25

## 2023-02-07 RX ADMIN — ATORVASTATIN CALCIUM 40 MILLIGRAM(S): 80 TABLET, FILM COATED ORAL at 21:29

## 2023-02-07 RX ADMIN — LISINOPRIL 40 MILLIGRAM(S): 2.5 TABLET ORAL at 13:35

## 2023-02-07 RX ADMIN — PIPERACILLIN AND TAZOBACTAM 200 GRAM(S): 4; .5 INJECTION, POWDER, LYOPHILIZED, FOR SOLUTION INTRAVENOUS at 06:58

## 2023-02-07 RX ADMIN — SODIUM CHLORIDE 100 MILLILITER(S): 9 INJECTION, SOLUTION INTRAVENOUS at 15:56

## 2023-02-07 RX ADMIN — Medication 25 MILLIGRAM(S): at 07:40

## 2023-02-07 RX ADMIN — MEROPENEM 100 MILLIGRAM(S): 1 INJECTION INTRAVENOUS at 21:29

## 2023-02-07 RX ADMIN — Medication 40 MILLIGRAM(S): at 00:08

## 2023-02-07 RX ADMIN — Medication 50 MILLIGRAM(S): at 04:10

## 2023-02-07 RX ADMIN — Medication 2: at 12:17

## 2023-02-07 RX ADMIN — CLOPIDOGREL BISULFATE 75 MILLIGRAM(S): 75 TABLET, FILM COATED ORAL at 14:13

## 2023-02-07 NOTE — H&P ADULT - PROBLEM SELECTOR PLAN 3
Called patient and left a message to call the office at [phone].    Had 5 episodes of n/v prior to coming to ED. Resolved. May have been due to pain fro prostate biopsy. May have been after operation.   - continue to monitor  - if nausea, can do Zofran 4mg q8h PRN

## 2023-02-07 NOTE — PATIENT PROFILE ADULT - FALL HARM RISK - HARM RISK INTERVENTIONS

## 2023-02-07 NOTE — H&P ADULT - PROBLEM SELECTOR PLAN 1
Underwent prostate biopsy yesterday 2/6. Developed nausea & vomiting after. Also experienced chills. Currently has a leukocytosis of 14.5. Is not febrile or hypotensive. Symptoms may be in s/o prostate biopsy leading to bacterial infiltration and infection. Would want to cover for anaerobes and gram negatives. Patient apparently had an adverse reaction to Zosyn with hives on face needing benadryl and steroids  - start Meropenem (2/7-); can continue until BCx come back  - follow-up BCx and UCx  - trend WBC and fever curve

## 2023-02-07 NOTE — CONSULT NOTE ADULT - SUBJECTIVE AND OBJECTIVE BOX
Urology PA Consult Note    HPI: 72 year old male with PMH of HTN, DM type II, HLD, CVA (), BPH, elevated PSA (13.8), s/p transperineal prostate biopsy by Dr. Ramos yesterday presents c/o vomiting and chills. Patient states yesterday after the procedure voided 3 times in the office although ended up requiring reed placement. While waiting for wife to pick him up, patient with chills and diaphoresis. Per wife, patient was c/o he was hungry, she took him to get something to eat although subsequently started vomiting. Denies fevers. Patient wife states while home, patient vomited again. Had a small soft blood movement although noted a streak of blood in the toilet, no blood on toilet paper after wiping. Wife reports  was having word finding difficulty while home that appeared intermittent, now improved while in the hospital. Patient prescribed Cipro after procedure although has not taken it. Patient c/o SOB, placed on 2L NC while in ED, improving. Urology consulted. Afebrile, hypertensive. Pending CT scan. Patient takes plavix for CVA hx, last dose 5 days ago. WBC 14.5, Na 119    PAST MEDICAL & SURGICAL HISTORY:  HTN (hypertension)  DM (diabetes mellitus)  type II  HLD (hyperlipidemia)  CVA (cerebrovascular accident)    Spinal stenosis, lumbosacral region  BPH without urinary obstruction  transperineal prostate biopsy 23  No significant past surgical history    MEDICATIONS  (STANDING):  diphenhydrAMINE Injectable 25 milliGRAM(s) IV Push once  vancomycin  IVPB. 1000 milliGRAM(s) IV Intermittent once    FAMILY HISTORY:  FH: type 2 diabetes (Father, Mother)  FHx: hypertension (Mother)    Allergies  aspirin (Hives)  contrast allergy (Unknown)  Gadolinium (Hives; Rash)  shellfish (Unknown)    SOCIAL HISTORY:   ETOH: denies  Tobacco: denies  Drugs: denies    REVIEW OF SYSTEMS: Pertinent positives and negatives as stated in HPI, otherwise negative    VITAL SIGNS  T(C): 36.9 (23 @ 04:05), Max: 37.2 (23 @ 21:58)  HR: 83 (23 @ 04:05)  BP: 143/83 (23 @ 04:05)  SpO2: 100% (23 @ 04:05)  Wt(kg): --    PHYSICAL EXAM  Gen: NAD  Pulm: Nonlabored breathing, on supplemental O2 via nasal cannula  CV: RRR  Abd: softly distended, non tender  : Uncircumcised, no lesions. Minimal dried blood at urethral meatus. Reed in place with orange clear urine. Testes descended bilaterally.  Testes and epididymis nontender bilaterally.  Ecchymosis of inferior scrotum extending anteriorly, no palpable hematoma  Rectal: No visible blood or bleeding external anus. Perineal region with visible puncture marks, no ecchymosis, tenderness, bleeding, edema or palpable  Ext: No edema present b/l    LABS:   @ 02:25    WBC --    / Hct --    / SCr 0.87      @ 22:12    WBC 14.51 / Hct 37.0  / SCr 0.89         119<LL>  |  88<L>  |  16  ----------------------------<  176<H>  3.9   |  19<L>  |  0.87    Ca    8.1<L>      2023 02:25    TPro  6.4  /  Alb  3.8  /  TBili  0.8  /  DBili  x   /  AST  28  /  ALT  22  /  AlkPhos  51      PT/INR - ( 2023 22:12 )   PT: 11.7 sec;   INR: 1.02 ratio         PTT - ( 2023 22:12 )  PTT:29.1 sec  Urinalysis Basic - ( 2023 22:39 )    Color: Colorless / Appearance: Clear / S.020 / pH: x  Gluc: x / Ketone: Trace  / Bili: Negative / Urobili: Negative   Blood: x / Protein: 30 mg/dL / Nitrite: Negative   Leuk Esterase: Small / RBC: 717 /hpf / WBC 9 /HPF   Sq Epi: x / Non Sq Epi: 0 /hpf / Bacteria: Negative      RADIOLOGY:  CT head- No acute intracranial hemorrhage, mass effect, or CT evidence of an acute   transcortical infarct.    CT chest/abd/pelvis-  CHEST:  LUNGS AND LARGE AIRWAYS: Patent central airways. No pulmonary nodules,   masses or consolidation. Dependent and bibasilar subsegmental atelectasis.  PLEURA: No pleural effusion.  VESSELS: Limited evaluation of the subsegmental pulmonary arteries in the   lung bases secondary to motion artifact. No pulmonary embolism in the   main, lobar, segmental or upper lobe subsegmental pulmonary arteries.   Atherosclerotic changes.  HEART: Heart size is normal. No pericardial effusion. Coronary artery   calcifications.  MEDIASTINUM AND DUNG: No lymphadenopathy.  CHEST WALL AND LOWER NECK: 1.5 cm right thyroid lobe nodule versus   artifact.    ABDOMEN AND PELVIS:  LIVER: Within normal limits.  BILE DUCTS: Normal caliber.  GALLBLADDER: Cholelithiasis.  SPLEEN: Within normal limits.  PANCREAS: Within normal limits.  ADRENALS: Within normal limits.  KIDNEYS/URETERS: No hydronephrosis. Indeterminate 2.1 cm left renal upper   pole attenuation lesion measuring slightly higher than fluid density, but   likely a cyst. Subcentimeter bilateral renal hypoattenuating lesions   which are too small to characterize. Nonspecific bilateral perinephric   fat stranding.    BLADDER: Reed catheter balloon within the urinary bladder.  REPRODUCTIVE ORGANS: Prostate is enlarged measuring up to 5.4 cm. No   adjacent rim-enhancing fluid collection to suggest an abscess.    BOWEL: No bowel obstruction or inflammation. Appendix is normal.  PERITONEUM: No ascites.  VESSELS: Atherosclerotic changes.  RETROPERITONEUM/LYMPH NODES: No lymphadenopathy. Mild left pelvic   inflammatory change which could be postprocedural in nature.  ABDOMINAL WALL: Small fat-containing left inguinal hernia.  BONES: Degenerative changes of the spine.    IMPRESSION:    CT CHEST:  Limited evaluation of the subsegmental pulmonary arteries in the lung   bases secondary to motion artifact. No pulmonary embolism in the main,   lobar, segmental or upper lobe subsegmental pulmonary arteries.    CT ABDOMEN/PELVIS:  No bowel obstruction or inflammation.
[facility] DIVISION OF KIDNEY DISEASES AND HYPERTENSION -- [phone]  -- INITIAL CONSULT NOTE  --------------------------------------------------------------------------------  HPI:  Pt. is a 72 y.o. M w/ PMHx of HTN, DM2, HLD, CVA (2014), BPH, elevated PSA (13.8), s/p transperineal prostate biopsy by Dr. Ramos yesterday presents c/o vomiting and chills. Nephrology consulted for overcorrected hyponatremia. Pt. denies having issues with sodium in the past and was eating ok on day of procedure. After the procedure (no anesthesia given) he had slurred speech, dysphasia, vomiting and chills. Also had Corbett placed after procedure but wife noticed decreased UOP. Pt. was nauseous vomiting and unable to tolerate PO intake. In the ER SNa was 117 which rapidly corrected to 130 after 1L NS. Pt states after fluids were given his neurological symptoms resolved. No on thiazide diuretic at home.       PAST HISTORY  --------------------------------------------------------------------------------  PAST MEDICAL & SURGICAL HISTORY:  HTN (hypertension)      DM (diabetes mellitus)  type II      HLD (hyperlipidemia)      CVA (cerebrovascular accident)  2014      Spinal stenosis, lumbosacral region      BPH without urinary obstruction      No significant past surgical history        FAMILY HISTORY:  FH: type 2 diabetes (Father, Mother)    FHx: hypertension (Mother)      PAST SOCIAL HISTORY:  Emigrated from [place] at 9 years old. No toxic habits disclosed.   Retired .     ALLERGIES & MEDICATIONS  --------------------------------------------------------------------------------  Allergies    [This allergen will not trigger allergy alert] contrast allergy (Unknown)  [This allergen will not trigger allergy alert] gadolinium (Unknown)  [This allergen will not trigger allergy alert] Gadolinium (Hives; Rash)  [This allergen will not trigger allergy alert] Iodinated Contrast Media (Rash)  aspirin (Hives)  shellfish (Unknown)  Zosyn (Hives)    Intolerances      Standing Inpatient Medications  atorvastatin 40 milliGRAM(s) Oral at bedtime  clopidogrel Tablet 75 milliGRAM(s) Oral daily  dextrose 5%. 1000 milliLiter(s) IV Continuous <Continuous>  dextrose 5%. 1000 milliLiter(s) IV Continuous <Continuous>  dextrose 5%. 1000 milliLiter(s) IV Continuous <Continuous>  dextrose 50% Injectable 25 Gram(s) IV Push once  dextrose 50% Injectable 12.5 Gram(s) IV Push once  dextrose 50% Injectable 25 Gram(s) IV Push once  glucagon  Injectable 1 milliGRAM(s) IntraMuscular once  insulin lispro (ADMELOG) corrective regimen sliding scale   SubCutaneous three times a day before meals  insulin lispro (ADMELOG) corrective regimen sliding scale   SubCutaneous at bedtime  lisinopril 40 milliGRAM(s) Oral daily  meropenem  IVPB      meropenem  IVPB 1000 milliGRAM(s) IV Intermittent every 8 hours    PRN Inpatient Medications  dextrose Oral Gel 15 Gram(s) Oral once PRN      REVIEW OF SYSTEMS  --------------------------------------------------------------------------------  As per HPI    VITALS/PHYSICAL EXAM  --------------------------------------------------------------------------------  T(C): 36.3 (02-07-23 @ 11:20), Max: 37.2 (02-06-23 @ 21:58)  HR: 85 (02-07-23 @ 13:34) (74 - 102)  BP: 123/61 (02-07-23 @ 13:34) (115/83 - 176/82)  RR: 18 (02-07-23 @ 13:34) (18 - 24)  SpO2: 97% (02-07-23 @ 13:34) (96% - 100%)  Wt(kg): --  Height (cm): 170.2 (02-06-23 @ 20:45)  Weight (kg): 70.8 (02-06-23 @ 20:45)  BMI (kg/m2): 24.4 (02-06-23 @ 20:45)  BSA (m2): 1.82 (02-06-23 @ 20:45)      02-07-23 @ 07:01  -  02-07-23 @ 15:11  --------------------------------------------------------  IN: 0 mL / OUT: 800 mL / NET: -800 mL      Physical Exam:  	Gen: NAD  	HEENT: MMM  	Pulm: CTA B/L  	CV: S1S2  	Abd: Soft, +BS, Corbett +   	Ext: No LE edema B/L  	Neuro: Awake, ano asterixis   	Skin: Warm and dry  	Vascular access: peripheral     LABS/STUDIES  --------------------------------------------------------------------------------              12.9   14.51 >-----------<  219      [02-06-23 @ 22:12]              37.0     130  |  97  |  14  ----------------------------<  224      [02-07-23 @ 12:09]  4.2   |  21  |  0.94        Ca     9.0     [02-07-23 @ 12:09]    TPro  6.4  /  Alb  3.8  /  TBili  0.8  /  DBili  x   /  AST  28  /  ALT  22  /  AlkPhos  51  [02-06-23 @ 22:12]    PT/INR: PT 11.7 , INR 1.02       [02-06-23 @ 22:12]  PTT: 29.1       [02-06-23 @ 22:12]    Serum Osmolality 313      [02-07-23 @ 12:09]    Creatinine Trend:  SCr 0.94 [02-07 @ 12:09]  SCr 0.87 [02-07 @ 02:25]  SCr 0.89 [02-06 @ 22:12]    Urinalysis - [02-06-23 @ 22:39]      Color Colorless / Appearance Clear / SG 1.020 / pH 6.0      Gluc 1000 mg/dL / Ketone Trace  / Bili Negative / Urobili Negative       Blood Large / Protein 30 mg/dL / Leuk Est Small / Nitrite Negative       / WBC 9 / Hyaline 1 / Gran  / Sq Epi  / Non Sq Epi 0 / Bacteria Negative    Urine Creatinine 9      [02-07-23 @ 08:44]  Urine Protein <7      [02-07-23 @ 08:44]  Urine Sodium 24      [02-07-23 @ 08:44]  Urine Potassium 8      [02-07-23 @ 08:44]  Urine Osmolality 146      [02-07-23 @ 08:44]

## 2023-02-07 NOTE — H&P ADULT - ATTENDING COMMENTS
73 Y/O M w/  T2DM, HTN, HLD, BPH presenting for nausea/vomting, chills after his prostate biopsy, found to be hyponatremic at 119. Now overcorrected to 131 after 1L of NS. Started on D5W, monitor.     Empiric Meropenem pending cultures.

## 2023-02-07 NOTE — H&P ADULT - NSHPLABSRESULTS_GEN_ALL_CORE
LABS:                           12.9   14.51 )-----------( 219      ( 2023 22:12 )             37.0     02-07    119<LL>  |  88<L>  |  16  ----------------------------<  176<H>  3.9   |  19<L>  |  0.87    Ca    8.1<L>      2023 02:25    TPro  6.4  /  Alb  3.8  /  TBili  0.8  /  DBili  x   /  AST  28  /  ALT  22  /  AlkPhos  51  02-06        PT/INR - ( 2023 22:12 )   PT: 11.7 sec;   INR: 1.02 ratio         PTT - ( 2023 22:12 )  PTT:29.1 sec      Urinalysis Basic - ( 2023 22:39 )    Color: Colorless / Appearance: Clear / S.020 / pH: x  Gluc: x / Ketone: Trace  / Bili: Negative / Urobili: Negative   Blood: x / Protein: 30 mg/dL / Nitrite: Negative   Leuk Esterase: Small / RBC: 717 /hpf / WBC 9 /HPF   Sq Epi: x / Non Sq Epi: 0 /hpf / Bacteria: Negative        LIVER FUNCTIONS - ( 2023 22:12 )  Alb: 3.8 g/dL / Pro: 6.4 g/dL / ALK PHOS: 51 U/L / ALT: 22 U/L / AST: 28 U/L / GGT: x             Blood Gas Profile w/Lytes - Venous: Performed In Lab (23 @ 00:11)  Blood Gas Profile w/Lytes - Venous: Performed In Lab (23 @ 19:38)    Blood Gas Profile w/Lytes - Venous: Performed In Lab (23 @ 00:11)  Blood Gas Profile w/Lytes - Venous: Performed In Lab (23 @ 19:38)    I&O's Summary         / Troponin T, High Sensitivity Result: 58 ng/L (23 @ 00:21)  Troponin T, High Sensitivity Result: 58 ng/L (23 @ 22:12)      CAPILLARY BLOOD GLUCOSE                MICRO:

## 2023-02-07 NOTE — CONSULT NOTE ADULT - PROBLEM SELECTOR RECOMMENDATION 9
Pt. admitted with SNa of 117 which rapidly corrected to 130 after 1L NS. SOsm 313 ( obtained with SNA of 130) indicated hyper osmolar state. Unclear if Pt. received Sorbitol or Mannitol during prostate biopsy procedure. UOSm of 146 indicates that SNa will correct on its own. Would give D5W at 100cc/hr an titrate to achieve SNA of 125 by tonight( repeat BMP tonight). Then tomorrow (2/8) will stop and aim for SNa of 130s. Given rapid resolution of neurological symptoms suspect that this is acute hyponatremia however as no documentation will have to treat as chronic hyponatremia (>48 hours). Avoid thiazide diuretics. Avoid NSAIDs.       If you have any questions, please feel free to contact me  Deondre Menjivar  Nephrology Fellow  [phone]; Prefer [employer] TEAMS  (After 5pm or on weekends please page the on-call fellow)

## 2023-02-07 NOTE — H&P ADULT - ASSESSMENT
Mr. Rogers is a 71 Y/O M w/  T2DM, HTN, HLD, BPH presenting for nausea/vomting, chills after his prostate biopsy, admitted for hyponatremia and nausea/vomiting.

## 2023-02-07 NOTE — PATIENT PROFILE ADULT - HAVE YOU HAD COVID IN THE LAST 60 DAYS?
GROUP THERAPY PROGRESS NOTE    Rl Tomlinson is participating in Recreational Therapy. Group time: 45 minutes    Goal orientation: social    Group therapy participation: active    Therapeutic interventions reviewed and discussed: Outdoor activities with peers. Impression of participation: Patient was escorted back to unit related to inability to follow guidelines for off unit groups. No

## 2023-02-07 NOTE — H&P ADULT - NSHPPHYSICALEXAM_GEN_ALL_CORE
VITALS:   T(C): 36.9 (02-07-23 @ 07:35), Max: 37.2 (02-06-23 @ 21:58)  HR: 75 (02-07-23 @ 09:00) (75 - 102)  BP: 139/67 (02-07-23 @ 09:00) (139/67 - 176/82)  RR: 18 (02-07-23 @ 09:00) (18 - 24)  SpO2: 99% (02-07-23 @ 09:00) (96% - 100%)    PHYSICAL EXAM:     GENERAL: NAD, lying in bed comfortably.  HEAD:  Atraumatic, normocephalic.  EYES: EOMI, PERRLA, conjunctiva and sclera clear.  ENT: Moist mucous membranes.  NECK: Supple, no JVD, trachea midline.  CHEST/LUNG: CTAB. No rales, rhonchi, wheezing, or rubs. Unlabored respirations.  HEART: RRR, no M/R/G, S1/S2  ABDOMEN: Soft, nontender, nondistended, no organomegaly. Normoactive bowel sounds.  EXTREMITIES:  2+ peripheral pulses b/l, brisk capillary refill. No clubbing, cyanosis, or edema.  Neurological:  AAOx3, no focal deficits.   SKIN: No rashes or lesions.  PSYCH: Normal affect and mood.

## 2023-02-07 NOTE — ED ADULT NURSE REASSESSMENT NOTE - NS ED NURSE REASSESS COMMENT FT1
Handoff received from Jackie HERNADEZ. Pt is well appearing, complaining of mild abd discomfort, and soft, VSS, routine neuro check completed. Corbett intact, draining clear yellow urine.  Resting comfortably, provided food tray, family at bedside.

## 2023-02-07 NOTE — ED ADULT NURSE REASSESSMENT NOTE - NS ED NURSE REASSESS COMMENT FT1
0735 Pt in ER gold rm 1 left. A&Ox4. wife at [facility], [facility]. Corbett cath draining clear yellow urine. IV intact x 2 RACF and R hand. Pt c/o itching. Hives noted on scalp, face and neck. No respiratory distress. CT with contrast was at 4am per pt. No reaction at that time and pt received pre meds prior to CT. Zosyn IVSS infusing. Zosyn discontinued. Dr notified. 0740 Benadryl IV given. 0800 Hives and itching resolved. 0830 Urology and admitting team eval pt. Pt TBA. No bed yet,

## 2023-02-07 NOTE — CONSULT NOTE ADULT - ASSESSMENT
Pt. is a 72 y.o. M w/ PMHx of HTN, DM2, HLD, CVA (2014), BPH, elevated PSA (13.8), s/p transperineal prostate biopsy by Dr. Ramos yesterday presents c/o vomiting and chills. Nephrology consulted for overcorrected hyponatremia.

## 2023-02-07 NOTE — H&P ADULT - PROBLEM SELECTOR PLAN 2
Came in with Na 117 (corrected 119) with repeat 119. S/p 1L fluids and maintenance. Unlikely to be hypovolemic hyponatremia as it wasn't responsive to fluids. Latest Na 131, overcorrection  - consult Nephrology   - D5W 100cc/hr for now

## 2023-02-07 NOTE — H&P ADULT - NSHPREVIEWOFSYSTEMS_GEN_ALL_CORE
REVIEW OF SYSTEMS:  CONSTITUTIONAL: No fever, chills, night sweats, or fatigue  EYES: No eye pain, visual disturbances, or discharge  ENMT:  No difficulty hearing, tinnitus, vertigo; No sinus or throat pain  NECK: No pain or stiffness  RESPIRATORY: No cough, wheezing, or hemoptysis; + SOB overnight but resolved now  CARDIOVASCULAR: No chest pain, palpitations, dizziness, or leg swelling  GASTROINTESTINAL: + abdominal tightness overnight. + nausea & vomiting yesterday; No diarrhea or constipation. No melena or hematochezia.  GENITOURINARY: No dysuria, frequency, hematuria, or incontinence  NEUROLOGICAL: + slurred speech yesterday, resolved today  SKIN: No itching, burning, rashes, or lesions   LYMPH NODES: No enlarged glands  ENDOCRINE: No heat or cold intolerance; No hair loss  MUSCULOSKELETAL: No joint pain or swelling; No muscle, back, or extremity pain  PSYCHIATRIC: No depression, anxiety, mood swings, or difficulty sleeping  HEME/LYMPH: No easy bruising, or bleeding gums  ALLERGY AND IMMUNOLOGIC: No hives or eczema

## 2023-02-07 NOTE — CONSULT NOTE ADULT - ATTENDING COMMENTS
I have seen this patient with the fellow and agree with their assessment and plan. I was physically present for significant portions of the evaluation and management (E/M) service provided.  I agree with the above history, physical, and plan which I have reviewed and edited where appropriate.    Lakhwinder Qureshi MD  Pager   Office     Contact me directly via [employer] Teams     (After 5 pm or on weekends please page the on-call fellow/attending, can check [email or web address] for schedule. Login is sarah cross, schedule under [facility] medicine, psych, derm)

## 2023-02-07 NOTE — CONSULT NOTE ADULT - ASSESSMENT
A/P: 72 year old male with PMH of HTN, DM type II, HLD, CVA (2014), BPH, elevated PSA (13.8), s/p transperineal prostate biopsy by Dr. Ramos p/w vomiting and chills. Corbett placed after procedure in the office. No post-op hematoma. Hyponatremia A/P: 72 year old male with PMH of HTN, DM type II, HLD, CVA (2014), BPH, elevated PSA (13.8), s/p transperineal prostate biopsy by Dr. Ramos p/w vomiting and chills. Reed placed after procedure in the office. No post-op hematoma. Hyponatremia    - The biopsy in the office of the prostate was uncomplicated, bleeding seen in stool likely 2/2 rectal ultrasound probe utilized during the procedure. There was minimal concern for biopsy needle going into the rectum during the biopsy. Post biopsy, patient went into retention, reed was placed in the office.   - Continue reed for retention  - Low concern for sepsis 2/2 protate biopsy as procedure was transperineal, however cannot exclude. Continue antimicrobials per primary teamn  - Continue plavix, patient's urine is clear and can safely restart plavix. If starts to develop significant hematuria, can contact urology service.  - Monitor for N/V  - Management of Hyponatremia to 119 per primary team  - Plan discussed with Dr. Richard Ayala [place] for Urology  [address] [place] [phone]

## 2023-02-07 NOTE — H&P ADULT - HISTORY OF PRESENT ILLNESS
Mr. Danielito Rogers is a 89 Y/O M w/ PMH  T2DM, HTN, HLD, BPH presenting with vomiting and chills that began after his prostate biopsy with Dr. Deondre Ramos of Urology on 02/06. Patient was picked up around 5PM after his prostate biopsy by family, and had an episode of vomiting. Mr. Danielito Rogers is a 71 Y/O M w/ PMH  T2DM, HTN, HLD, BPH presenting with vomiting and chills that began after his prostate biopsy with Dr. Deondre Ramos of Urology on 02/06. Patient was picked up around 5PM after his prostate biopsy by family, and had an episode of vomiting. He went home and then also had 4 more episodes of vomiting. He complained of chills during this time. Pt's wife also says his speech was slurred after she picked him up and tojk him home. He had a BM at home, which was covered with a streak of bright red blood. Wife brought patient to ER for his vomiting, chills and slurred speech.    In the ED, the patient received Vancomyin, Zosyn x1, 1L NS and underwent CT Head, A/P and Chest Angio, all of which were negative for any acute processes. Afebrile, nontachycardic and normotensive in ED.    When I saw the patient this AM, he was resting comfortably in bed. His speech is back to normal per his wife. Currently not experiencing any SOB, chest pain, dizziness, weakness, abdominal pain. Currently just experiencing some back pain, and R shoulder pain.

## 2023-02-08 ENCOUNTER — TRANSCRIPTION ENCOUNTER (OUTPATIENT)
Age: 73
End: 2023-02-08

## 2023-02-08 LAB
A1C WITH ESTIMATED AVERAGE GLUCOSE RESULT: 7.4 % — HIGH (ref 4–5.6)
ALBUMIN SERPL ELPH-MCNC: 3.4 G/DL — SIGNIFICANT CHANGE UP (ref 3.3–5)
ALP SERPL-CCNC: 43 U/L — SIGNIFICANT CHANGE UP (ref 40–120)
ALT FLD-CCNC: 18 U/L — SIGNIFICANT CHANGE UP (ref 10–45)
ANION GAP SERPL CALC-SCNC: 10 MMOL/L — SIGNIFICANT CHANGE UP (ref 5–17)
ANION GAP SERPL CALC-SCNC: 8 MMOL/L — SIGNIFICANT CHANGE UP (ref 5–17)
ANION GAP SERPL CALC-SCNC: 9 MMOL/L — SIGNIFICANT CHANGE UP (ref 5–17)
AST SERPL-CCNC: 19 U/L — SIGNIFICANT CHANGE UP (ref 10–40)
BILIRUB SERPL-MCNC: 0.2 MG/DL — SIGNIFICANT CHANGE UP (ref 0.2–1.2)
BUN SERPL-MCNC: 16 MG/DL — SIGNIFICANT CHANGE UP (ref 7–23)
BUN SERPL-MCNC: 17 MG/DL — SIGNIFICANT CHANGE UP (ref 7–23)
BUN SERPL-MCNC: 19 MG/DL — SIGNIFICANT CHANGE UP (ref 7–23)
CALCIUM SERPL-MCNC: 8.3 MG/DL — LOW (ref 8.4–10.5)
CALCIUM SERPL-MCNC: 8.6 MG/DL — SIGNIFICANT CHANGE UP (ref 8.4–10.5)
CALCIUM SERPL-MCNC: 8.6 MG/DL — SIGNIFICANT CHANGE UP (ref 8.4–10.5)
CHLORIDE SERPL-SCNC: 101 MMOL/L — SIGNIFICANT CHANGE UP (ref 96–108)
CHLORIDE SERPL-SCNC: 98 MMOL/L — SIGNIFICANT CHANGE UP (ref 96–108)
CHLORIDE SERPL-SCNC: 99 MMOL/L — SIGNIFICANT CHANGE UP (ref 96–108)
CO2 SERPL-SCNC: 22 MMOL/L — SIGNIFICANT CHANGE UP (ref 22–31)
CO2 SERPL-SCNC: 23 MMOL/L — SIGNIFICANT CHANGE UP (ref 22–31)
CO2 SERPL-SCNC: 24 MMOL/L — SIGNIFICANT CHANGE UP (ref 22–31)
CREAT SERPL-MCNC: 1.1 MG/DL — SIGNIFICANT CHANGE UP (ref 0.5–1.3)
CREAT SERPL-MCNC: 1.14 MG/DL — SIGNIFICANT CHANGE UP (ref 0.5–1.3)
CREAT SERPL-MCNC: 1.18 MG/DL — SIGNIFICANT CHANGE UP (ref 0.5–1.3)
EGFR: 65 ML/MIN/1.73M2 — SIGNIFICANT CHANGE UP
EGFR: 67 ML/MIN/1.73M2 — SIGNIFICANT CHANGE UP
EGFR: 70 ML/MIN/1.73M2 — SIGNIFICANT CHANGE UP
ESTIMATED AVERAGE GLUCOSE: 166 MG/DL — HIGH (ref 68–114)
GLUCOSE BLDC GLUCOMTR-MCNC: 177 MG/DL — HIGH (ref 70–99)
GLUCOSE BLDC GLUCOMTR-MCNC: 183 MG/DL — HIGH (ref 70–99)
GLUCOSE BLDC GLUCOMTR-MCNC: 232 MG/DL — HIGH (ref 70–99)
GLUCOSE BLDC GLUCOMTR-MCNC: 244 MG/DL — HIGH (ref 70–99)
GLUCOSE SERPL-MCNC: 241 MG/DL — HIGH (ref 70–99)
GLUCOSE SERPL-MCNC: 285 MG/DL — HIGH (ref 70–99)
GLUCOSE SERPL-MCNC: 294 MG/DL — HIGH (ref 70–99)
HCT VFR BLD CALC: 37.3 % — LOW (ref 39–50)
HCV AB S/CO SERPL IA: 0.05 S/CO — SIGNIFICANT CHANGE UP (ref 0–0.99)
HCV AB SERPL-IMP: SIGNIFICANT CHANGE UP
HGB BLD-MCNC: 12.7 G/DL — LOW (ref 13–17)
MAGNESIUM SERPL-MCNC: 1.8 MG/DL — SIGNIFICANT CHANGE UP (ref 1.6–2.6)
MCHC RBC-ENTMCNC: 28 PG — SIGNIFICANT CHANGE UP (ref 27–34)
MCHC RBC-ENTMCNC: 34 GM/DL — SIGNIFICANT CHANGE UP (ref 32–36)
MCV RBC AUTO: 82.2 FL — SIGNIFICANT CHANGE UP (ref 80–100)
NRBC # BLD: 0 /100 WBCS — SIGNIFICANT CHANGE UP (ref 0–0)
PHOSPHATE SERPL-MCNC: 3.1 MG/DL — SIGNIFICANT CHANGE UP (ref 2.5–4.5)
PLATELET # BLD AUTO: 224 K/UL — SIGNIFICANT CHANGE UP (ref 150–400)
POTASSIUM SERPL-MCNC: 3.9 MMOL/L — SIGNIFICANT CHANGE UP (ref 3.5–5.3)
POTASSIUM SERPL-MCNC: 3.9 MMOL/L — SIGNIFICANT CHANGE UP (ref 3.5–5.3)
POTASSIUM SERPL-MCNC: 4.1 MMOL/L — SIGNIFICANT CHANGE UP (ref 3.5–5.3)
POTASSIUM SERPL-SCNC: 3.9 MMOL/L — SIGNIFICANT CHANGE UP (ref 3.5–5.3)
POTASSIUM SERPL-SCNC: 3.9 MMOL/L — SIGNIFICANT CHANGE UP (ref 3.5–5.3)
POTASSIUM SERPL-SCNC: 4.1 MMOL/L — SIGNIFICANT CHANGE UP (ref 3.5–5.3)
PROT SERPL-MCNC: 5.9 G/DL — LOW (ref 6–8.3)
RBC # BLD: 4.54 M/UL — SIGNIFICANT CHANGE UP (ref 4.2–5.8)
RBC # FLD: 12.8 % — SIGNIFICANT CHANGE UP (ref 10.3–14.5)
SODIUM SERPL-SCNC: 130 MMOL/L — LOW (ref 135–145)
SODIUM SERPL-SCNC: 131 MMOL/L — LOW (ref 135–145)
SODIUM SERPL-SCNC: 133 MMOL/L — LOW (ref 135–145)
WBC # BLD: 10.78 K/UL — HIGH (ref 3.8–10.5)
WBC # FLD AUTO: 10.78 K/UL — HIGH (ref 3.8–10.5)

## 2023-02-08 PROCEDURE — 99233 SBSQ HOSP IP/OBS HIGH 50: CPT | Mod: GC

## 2023-02-08 PROCEDURE — 99231 SBSQ HOSP IP/OBS SF/LOW 25: CPT | Mod: GC

## 2023-02-08 PROCEDURE — 99232 SBSQ HOSP IP/OBS MODERATE 35: CPT | Mod: GC

## 2023-02-08 RX ORDER — SODIUM CHLORIDE 9 MG/ML
1000 INJECTION, SOLUTION INTRAVENOUS
Refills: 0 | Status: DISCONTINUED | OUTPATIENT
Start: 2023-02-08 | End: 2023-02-09

## 2023-02-08 RX ORDER — SODIUM CHLORIDE 9 MG/ML
500 INJECTION, SOLUTION INTRAVENOUS ONCE
Refills: 0 | Status: COMPLETED | OUTPATIENT
Start: 2023-02-08 | End: 2023-02-08

## 2023-02-08 RX ORDER — TAMSULOSIN HYDROCHLORIDE 0.4 MG/1
0.4 CAPSULE ORAL AT BEDTIME
Refills: 0 | Status: DISCONTINUED | OUTPATIENT
Start: 2023-02-08 | End: 2023-02-09

## 2023-02-08 RX ORDER — GLIMEPIRIDE 1 MG
1 TABLET ORAL
Qty: 0 | Refills: 0 | DISCHARGE

## 2023-02-08 RX ADMIN — Medication 4: at 09:15

## 2023-02-08 RX ADMIN — TAMSULOSIN HYDROCHLORIDE 0.4 MILLIGRAM(S): 0.4 CAPSULE ORAL at 21:26

## 2023-02-08 RX ADMIN — SODIUM CHLORIDE 150 MILLILITER(S): 9 INJECTION, SOLUTION INTRAVENOUS at 12:34

## 2023-02-08 RX ADMIN — Medication 4: at 17:46

## 2023-02-08 RX ADMIN — MEROPENEM 100 MILLIGRAM(S): 1 INJECTION INTRAVENOUS at 05:07

## 2023-02-08 RX ADMIN — SODIUM CHLORIDE 125 MILLILITER(S): 9 INJECTION, SOLUTION INTRAVENOUS at 04:37

## 2023-02-08 RX ADMIN — MEROPENEM 100 MILLIGRAM(S): 1 INJECTION INTRAVENOUS at 14:33

## 2023-02-08 RX ADMIN — CLOPIDOGREL BISULFATE 75 MILLIGRAM(S): 75 TABLET, FILM COATED ORAL at 12:33

## 2023-02-08 RX ADMIN — Medication 2: at 12:34

## 2023-02-08 RX ADMIN — ATORVASTATIN CALCIUM 40 MILLIGRAM(S): 80 TABLET, FILM COATED ORAL at 21:26

## 2023-02-08 RX ADMIN — LISINOPRIL 40 MILLIGRAM(S): 2.5 TABLET ORAL at 05:52

## 2023-02-08 RX ADMIN — SODIUM CHLORIDE 1000 MILLILITER(S): 9 INJECTION, SOLUTION INTRAVENOUS at 03:50

## 2023-02-08 RX ADMIN — MEROPENEM 100 MILLIGRAM(S): 1 INJECTION INTRAVENOUS at 21:33

## 2023-02-08 NOTE — PHYSICAL THERAPY INITIAL EVALUATION ADULT - GENERAL OBSERVATIONS, REHAB EVAL
Pt received semi-supine in bed, NAD, VSS, +IV, +Corbett. Pt agreeable and motivated to participate in PT. Pt spouse and children present for PT eval.

## 2023-02-08 NOTE — DISCHARGE NOTE PROVIDER - PROVIDER TOKENS
PROVIDER:[TOKEN:[[id number]],FOLLOWUP:[1 week],ESTABLISHEDPATIENT:[T]] PROVIDER:[TOKEN:[[id number]],FOLLOWUP:[1 week],ESTABLISHEDPATIENT:[T]],PROVIDER:[TOKEN:[[id number]],FOLLOWUP:[1 week],ESTABLISHEDPATIENT:[T]]

## 2023-02-08 NOTE — PHYSICAL THERAPY INITIAL EVALUATION ADULT - NSPTDISCHREC_GEN_A_CORE
if pt goes home, pt requires assist into the home and assist with functional mobility/stair negotiation/Home PT

## 2023-02-08 NOTE — PHYSICAL THERAPY INITIAL EVALUATION ADULT - NSPTDMEREC_GEN_A_CORE
pt owns cane; Pt will require rolling walker for discharge due to decreased balance and safety with transfers and ambulation/rolling walker

## 2023-02-08 NOTE — PROGRESS NOTE ADULT - PROBLEM SELECTOR PLAN 1
Pt. admitted with SNa of 117 which rapidly corrected to 130 after 1L NS. SOsm 313 ( obtained with SNA of 130) indicated hyper osmolar state. Unclear if Pt. received Sorbitol or Mannitol during prostate biopsy procedure. UOSm of 146 indicates that SNa will correct on its own. Would continue D5W to maintain SNA of 131-133 today. Then tomorrow (2/9) will stop. Must avoid overcorrection of SNa by more than 18 in a 48 hour period. BMP Q8H for now. Given rapid resolution of neurological symptoms suspect that this is acute hyponatremia however as no documentation will have to treat as chronic hyponatremia (>48 hours). Avoid thiazide diuretics. Avoid NSAIDs.       If you have any questions, please feel free to contact me  Deondre Menjivar  Nephrology Fellow  [phone]; Prefer [employer] TEAMS  (After 5pm or on weekends please page the on-call fellow).

## 2023-02-08 NOTE — DISCHARGE NOTE PROVIDER - CARE PROVIDER_API CALL
Markell Soto)  Medicine  [facility]  [address] [place] [address], [place] [address]  Phone: [phone]  Fax: [phone]  Established Patient  Follow Up Time: 1 week   Markell Soto)  Medicine  [facility]  [address] [facility] [address] [facility], [address]  [place] [address]  Phone: [phone]  Fax: [phone]  Established Patient  Follow Up Time: 1 week    Deondre Ramos)  Urology  [address], [address]  [place] [address]  Phone: [phone]  Fax: [phone]  Established Patient  Follow Up Time: 1 week

## 2023-02-08 NOTE — DISCHARGE NOTE PROVIDER - CARE PROVIDERS DIRECT ADDRESSES
,[email or web address]

## 2023-02-08 NOTE — PROGRESS NOTE ADULT - SUBJECTIVE AND OBJECTIVE BOX
Subjective    Patient seen and examined. States he feels well but has had some cramping in b/l calves. Otherwise, denies fevers/chills, perineal pain.     Objective    Vital signs  T(F): , Max: 98.4 (02-07-23 @ 15:53)  HR: 72 (02-08-23 @ 05:19)  BP: 122/60 (02-08-23 @ 05:19)  SpO2: 97% (02-08-23 @ 05:19)  Wt(kg): --    Output     02-07 @ 07:01  -  02-08 @ 07:00  --------------------------------------------------------  IN: 850 mL / OUT: 4400 mL / NET: -3550 mL        General: NAD  Abdomen: soft/non-tender/non-distended  : perineum soft without induration or swelling, pinpoint perineal biopsy site with small scab, no active bleeding or oozing  reed in place draining clear yellow urine     Labs      02-08 @ 07:13    WBC --    / Hct --    / SCr 1.10     02-08 @ 07:11    WBC 10.78 / Hct 37.3  / SCr --         Culture - Urine (02.06.23 @ 22:39)    Specimen Source: Clean Catch Clean Catch (Midstream)    Culture Results:   10,000 - 49,000 CFU/mL Escherichia coli    Culture - Blood (02.06.23 @ 19:30)    Specimen Source: .Blood Blood-Peripheral    Culture Results:   No growth to date.    < from: CT Abdomen and Pelvis w/ IV Cont (02.07.23 @ 05:56) >  KIDNEYS/URETERS: No hydronephrosis. Indeterminate 2.1 cm left renal upper   pole attenuation lesion measuring slightly higher than fluid density, but   likely a cyst. Subcentimeter bilateral renal hypoattenuating lesions   which are too small to characterize. Nonspecific bilateral perinephric   fat stranding.    BLADDER: Reed catheter balloon within the urinary bladder.  REPRODUCTIVE ORGANS: Prostate is enlarged measuring up to 5.4 cm. No   adjacent rim-enhancing fluid collection to suggest an abscess.    < end of copied text >   [FreeTextEntry1] : A: 59yo for annual GYN exam, menopause, abdominal bloating, osteoporosis\par \par P: GYN exam done\par     pap smear done\par     safe sex practices\par     pain and bleeding precautions\par     referral fro pelvic sonogram\par     encourage GI consultation\par     encourage healthy diet and exercise\par     f/u after imaging or PRN

## 2023-02-08 NOTE — PROGRESS NOTE ADULT - SUBJECTIVE AND OBJECTIVE BOX
[facility] DIVISION OF KIDNEY DISEASES AND HYPERTENSION -- FOLLOW UP NOTE  --------------------------------------------------------------------------------  HPI:  Pt. is a 72 y.o. M w/ PMHx of HTN, DM2, HLD, CVA (2014), BPH, elevated PSA (13.8), s/p transperineal prostate biopsy by Dr. Ramos yesterday presents c/o vomiting and chills. Nephrology consulted for overcorrected hyponatremia. Pt. denies having issues with sodium in the past and was eating ok on day of procedure. After the procedure (no anesthesia given) he had slurred speech, dysphasia, vomiting and chills. Also had Corbett placed after procedure but wife noticed decreased UOP. Pt. was nauseous vomiting and unable to tolerate PO intake. In the ER SNa was 117 which rapidly corrected to 130 after 1L NS. Pt states after fluids were given his neurological symptoms resolved. No on thiazide diuretic at home.     Pt/. seen this AM states over all feels better. No H/A, blurry vision, dysphasia or fogginess of mind. Pt. with some muscle cramps in thighs. S Na trended to 133 this AM.    PAST HISTORY  --------------------------------------------------------------------------------  No significant changes to PMH, PSH, FHx, SHx, unless otherwise noted    ALLERGIES & MEDICATIONS  --------------------------------------------------------------------------------  Allergies    [This allergen will not trigger allergy alert] contrast allergy (Unknown)  [This allergen will not trigger allergy alert] gadolinium (Unknown)  [This allergen will not trigger allergy alert] Gadolinium (Hives; Rash)  [This allergen will not trigger allergy alert] Iodinated Contrast Media (Rash)  aspirin (Hives)  shellfish (Unknown)  Zosyn (Hives)    Intolerances      Standing Inpatient Medications  atorvastatin 40 milliGRAM(s) Oral at bedtime  clopidogrel Tablet 75 milliGRAM(s) Oral daily  dextrose 5%. 1000 milliLiter(s) IV Continuous <Continuous>  dextrose 5%. 1000 milliLiter(s) IV Continuous <Continuous>  dextrose 5%. 1000 milliLiter(s) IV Continuous <Continuous>  dextrose 50% Injectable 25 Gram(s) IV Push once  dextrose 50% Injectable 12.5 Gram(s) IV Push once  dextrose 50% Injectable 25 Gram(s) IV Push once  glucagon  Injectable 1 milliGRAM(s) IntraMuscular once  insulin lispro (ADMELOG) corrective regimen sliding scale   SubCutaneous three times a day before meals  insulin lispro (ADMELOG) corrective regimen sliding scale   SubCutaneous at bedtime  lisinopril 40 milliGRAM(s) Oral daily  meropenem  IVPB      meropenem  IVPB 1000 milliGRAM(s) IV Intermittent every 8 hours  tamsulosin 0.4 milliGRAM(s) Oral at bedtime    PRN Inpatient Medications  dextrose Oral Gel 15 Gram(s) Oral once PRN      REVIEW OF SYSTEMS  --------------------------------------------------------------------------------  As pre HPI    VITALS/PHYSICAL EXAM  --------------------------------------------------------------------------------  T(C): 36.9 (02-08-23 @ 05:19), Max: 36.9 (02-07-23 @ 15:53)  HR: 80 (02-08-23 @ 10:50) (72 - 85)  BP: 147/78 (02-08-23 @ 10:50) (112/68 - 147/78)  RR: 18 (02-08-23 @ 05:19) (18 - 18)  SpO2: 96% (02-08-23 @ 10:50) (96% - 97%)  Wt(kg): --  Height (cm): 170.2 (02-06-23 @ 20:45)  Weight (kg): 70.8 (02-06-23 @ 20:45)  BMI (kg/m2): 24.4 (02-06-23 @ 20:45)  BSA (m2): 1.82 (02-06-23 @ 20:45)      02-07-23 @ 07:01  -  02-08-23 @ 07:00  --------------------------------------------------------  IN: 850 mL / OUT: 4400 mL / NET: -3550 mL        Physical Exam:  	Gen: NAD  	HEENT: MMM  	Pulm: CTA B/L  	CV: S1S2  	Abd: Soft, +BS, Corbett +   	Ext: No LE edema B/L  	Neuro: Awake, ano asterixis   	Skin: Warm and dry  	Vascular access: peripheral       LABS/STUDIES  --------------------------------------------------------------------------------              12.7   10.78 >-----------<  224      [02-08-23 @ 07:11]              37.3     131  |  99  |  16  ----------------------------<  285      [02-08-23 @ 07:13]  3.9   |  24  |  1.10        Ca     8.3     [02-08-23 @ 07:13]      Mg     1.8     [02-08-23 @ 07:13]      Phos  3.1     [02-08-23 @ 07:13]    TPro  5.9  /  Alb  3.4  /  TBili  0.2  /  DBili  x   /  AST  19  /  ALT  18  /  AlkPhos  43  [02-08-23 @ 07:13]    PT/INR: PT 11.7 , INR 1.02       [02-06-23 @ 22:12]  PTT: 29.1       [02-06-23 @ 22:12]    Serum Osmolality 313      [02-07-23 @ 12:09]    Creatinine Trend:  SCr 1.10 [02-08 @ 07:13]  SCr 1.14 [02-08 @ 01:01]  SCr 0.94 [02-07 @ 12:09]  SCr 0.87 [02-07 @ 02:25]  SCr 0.89 [02-06 @ 22:12]    Urinalysis - [02-06-23 @ 22:39]      Color Colorless / Appearance Clear / SG 1.020 / pH 6.0      Gluc 1000 mg/dL / Ketone Trace  / Bili Negative / Urobili Negative       Blood Large / Protein 30 mg/dL / Leuk Est Small / Nitrite Negative       / WBC 9 / Hyaline 1 / Gran  / Sq Epi  / Non Sq Epi 0 / Bacteria Negative    Urine Creatinine 9      [02-07-23 @ 08:44]  Urine Protein <7      [02-07-23 @ 08:44]  Urine Sodium 24      [02-07-23 @ 08:44]  Urine Urea Nitrogen 105      [02-07-23 @ 08:44]  Urine Potassium 8      [02-07-23 @ 08:44]  Urine Osmolality 146      [02-07-23 @ 08:44]

## 2023-02-08 NOTE — PHYSICAL THERAPY INITIAL EVALUATION ADULT - BALANCE DISTURBANCE, SYSTEM IMPAIRMENT CONTRIBUTE, REHAB EVAL
pt c/o slip and fall on leaves , multiple abrasions to forehead, nose, chin, b/l knees musculoskeletal

## 2023-02-08 NOTE — DISCHARGE NOTE PROVIDER - HOSPITAL COURSE
HPI:  Mr. Danielito Rogers is a 71 Y/O M w/ PMH  T2DM, HTN, HLD, BPH presenting with vomiting and chills that began after his prostate biopsy with Dr. Deondre Ramos of Urology on 02/06. Patient was picked up around 5PM after his prostate biopsy by family, and had an episode of vomiting. He went home and then also had 4 more episodes of vomiting. He complained of chills during this time. Pt's wife also says his speech was slurred after she picked him up and tojk him home. He had a BM at home, which was covered with a streak of bright red blood. Wife brought patient to ER for his vomiting, chills and slurred speech.    In the ED, the patient received Vancomyin, Zosyn x1, 1L NS and underwent CT Head, A/P and Chest Angio, all of which were negative for any acute processes. Afebrile, nontachycardic and normotensive in ED.    Hospital Course:  Patient admitted for possible sepsis in s/o leukocytosis, and recent prostate biopsy. He remained afebrile and without other signs of sepsis. He was started on antibiotics empirically in case of bacterial seeding from biopsy.    He was noted to be hyponatremic, was given fluids, and then sodium overcorrected. Subsequently, he was started on fluids to help decrease sodium levels to a safe levels to prevent overcorrection.     Overall, patient remained stable clinically and symptomatically.    On 2/___, patient cleared for discharge as he remained stable clinically and symptomatically.     HPI:  Mr. Danielito Rogers is a 71 Y/O M w/ PMH  T2DM, HTN, HLD, BPH presenting with vomiting and chills that began after his prostate biopsy with Dr. Deondre Ramos of Urology on 02/06. Patient was picked up around 5PM after his prostate biopsy by family, and had an episode of vomiting. He went home and then also had 4 more episodes of vomiting. He complained of chills during this time. Pt's wife also says his speech was slurred after she picked him up and tojk him home. He had a BM at home, which was covered with a streak of bright red blood. Wife brought patient to ER for his vomiting, chills and slurred speech.    In the ED, the patient received Vancomyin, Zosyn x1, 1L NS and underwent CT Head, A/P and Chest Angio, all of which were negative for any acute processes. Afebrile, nontachycardic and normotensive in ED.    Hospital Course:  Patient admitted for possible sepsis in s/o leukocytosis, and recent prostate biopsy. He remained afebrile and without other signs of sepsis. He was started on antibiotics empirically in case of bacterial seeding from biopsy.    He was noted to be hyponatremic, was given fluids, and then sodium overcorrected. Subsequently, he was started on fluids to help decrease sodium levels to a safe levels to prevent overcorrection. Nephrology assessed him and determined that his overcorrection was only mild and did not need any further correction. Given the degree of overcorrwection with fluids, they determined that his sodium would resolve on itys own    Overall, patient remained stable clinically and symptomatically.    On 2/9, patient was cleared by the team for discharge as he remained stable clinically and asymptomatic.

## 2023-02-08 NOTE — PHYSICAL THERAPY INITIAL EVALUATION ADULT - PLANNED THERAPY INTERVENTIONS, PT EVAL
Goal: In 2-weeks pt will be able to ascend and descend 1 flight of stairs using L handrail and cane independently in order to return to home and get to bed and bathroom safely./balance training/gait training/strengthening/transfer training

## 2023-02-08 NOTE — DISCHARGE NOTE PROVIDER - NSDCCPTREATMENT_GEN_ALL_CORE_FT
PRINCIPAL PROCEDURE  Procedure: CT abdomen pelvis  Findings and Treatment:   No bowel obstruction or inflammation.      SECONDARY PROCEDURE  Procedure: CT head wo con  Findings and Treatment: No acute intracranial hemorrhage, mass effect, or CT evidence of an acute   transcortical infarct.       PRINCIPAL PROCEDURE  Procedure: CT abdomen pelvis  Findings and Treatment:   No bowel obstruction or inflammation.      SECONDARY PROCEDURE  Procedure: CT head wo con  Findings and Treatment: No acute intracranial hemorrhage, mass effect, or CT evidence of an acute transcortical infarct.

## 2023-02-08 NOTE — PHYSICAL THERAPY INITIAL EVALUATION ADULT - SITTING BALANCE: DYNAMIC
Add Pregnancy And Lactation Warning To Medication Counseling?: No Pregnancy And Lactation Warning Text: This medication is Pregnancy Category B and is considered safe during pregnancy. It is unknown if this medication is excreted in breast milk. Enbrel Dosing: 50 mg SC twice weekly for 3 months then once a week there after Enbrel Monitoring Guidelines: A yearly test for tuberculosis is required while taking Enbrel. Diagnosis (Required): Psoriasis Detail Level: Zone normal balance

## 2023-02-08 NOTE — DISCHARGE NOTE PROVIDER - NSDCMRMEDTOKEN_GEN_ALL_CORE_FT
atorvastatin 40 mg oral tablet: 1 tab(s) orally once a day (at bedtime)  lisinopril 40 mg oral tablet: 1 tab(s) orally once a day  metFORMIN 1000 mg oral tablet: 1 tab(s) orally 2 times a day  Multiple Vitamins oral capsule: 1 cap(s) orally once a day last dose 8/23/21  Plavix 75 mg oral tablet: 1 tab(s) orally once a day  tamsulosin 0.4 mg oral capsule: 1 cap(s) orally once a day   atorvastatin 40 mg oral tablet: 1 tab(s) orally once a day (at bedtime)  lisinopril 40 mg oral tablet: 1 tab(s) orally once a day  metFORMIN 1000 mg oral tablet: 1 tab(s) orally 2 times a day  Multiple Vitamins oral capsule: 1 cap(s) orally once a day last dose 8/23/21  Plavix 75 mg oral tablet: 1 tab(s) orally once a day  rolling walker: 1 unit(s)  prn   tamsulosin 0.4 mg oral capsule: 1 cap(s) orally once a day

## 2023-02-08 NOTE — PHYSICAL THERAPY INITIAL EVALUATION ADULT - ADDITIONAL COMMENTS
Pt lives with spouse and children in private house with 13 steps to the second floor (bedroom, bathroom) with L handrail and 3 steps into the house. Prior to admission, pt ambulated independently with cane.

## 2023-02-08 NOTE — PHYSICAL THERAPY INITIAL EVALUATION ADULT - STRENGTHENING, PT EVAL
Goal: Pt will increase strength >half a grade  t/o extremities to improve safety of transfers and ambulation in 2-weeks.

## 2023-02-08 NOTE — PROGRESS NOTE ADULT - PROBLEM SELECTOR PLAN 2
Came in with Na 117 (corrected 119) with repeat 119. S/p 1L fluids and maintenance. Unlikely to be hypovolemic hyponatremia as it wasn't responsive to fluids. Latest Na 131, overcorrection  - consult Nephrology   - D5W 100cc/hr for now Came in with Na 117 (corrected 119) with repeat 119. S/p 1L fluids and maintenance. Unlikely to be hypovolemic hyponatremia as it wasn't responsive to fluids. Latest Na 131, overcorrection  - consult Nephrology   - D5W 150cc/hr for now  - avoid Na above 134

## 2023-02-08 NOTE — PROGRESS NOTE ADULT - SUBJECTIVE AND OBJECTIVE BOX
Internal Medicine   Cristino San| PGY-1    OVERNIGHT EVENTS:      SUBJECTIVE:       MEDICATIONS  (STANDING):  atorvastatin 40 milliGRAM(s) Oral at bedtime  clopidogrel Tablet 75 milliGRAM(s) Oral daily  dextrose 5%. 1000 milliLiter(s) (50 mL/Hr) IV Continuous <Continuous>  dextrose 5%. 1000 milliLiter(s) (100 mL/Hr) IV Continuous <Continuous>  dextrose 5%. 1000 milliLiter(s) (125 mL/Hr) IV Continuous <Continuous>  dextrose 50% Injectable 25 Gram(s) IV Push once  dextrose 50% Injectable 12.5 Gram(s) IV Push once  dextrose 50% Injectable 25 Gram(s) IV Push once  glucagon  Injectable 1 milliGRAM(s) IntraMuscular once  insulin lispro (ADMELOG) corrective regimen sliding scale   SubCutaneous three times a day before meals  insulin lispro (ADMELOG) corrective regimen sliding scale   SubCutaneous at bedtime  lisinopril 40 milliGRAM(s) Oral daily  meropenem  IVPB      meropenem  IVPB 1000 milliGRAM(s) IV Intermittent every 8 hours    MEDICATIONS  (PRN):  dextrose Oral Gel 15 Gram(s) Oral once PRN Blood Glucose LESS THAN 70 milliGRAM(s)/deciliter        T(F): 98.4 (02-08-23 @ 05:19), Max: 98.5 (02-07-23 @ 07:35)  HR: 72 (02-08-23 @ 05:19) (72 - 86)  BP: 122/60 (02-08-23 @ 05:19) (112/68 - 145/74)  BP(mean): --  RR: 18 (02-08-23 @ 05:19) (18 - 20)  SpO2: 97% (02-08-23 @ 05:19) (96% - 100%)    PHYSICAL EXAM:     GENERAL: NAD, lying in bed comfortably.  HEAD:  Atraumatic, normocephalic.  EYES: EOMI, PERRLA, conjunctiva and sclera clear, no nystagmus noted.  ENT: Moist mucous membranes,   NECK: Supple. No JVD. Trachea midline.  CHEST/LUNG: CTAB. No rales, rhonchi, wheezing, or rubs. Unlabored respirations.  HEART: RRR, no M/R/G, normal S1/S2.  ABDOMEN: Soft, nontender, nondistended, no organomegaly. Normoactive bowel sounds.  EXTREMITIES:  2+ peripheral pulses b/l, brisk capillary refill. No clubbing, cyanosis, or edema.  MSK: No gross deformities noted.   NEURO:  AAOx3, no focal deficits.   SKIN: No rashes or lesions.  PSYCH: Normal mood, affect.    TELEMETRY:    LABS:                        12.9   14.51 )-----------( 219      ( 06 Feb 2023 22:12 )             37.0     02-08    133<L>  |  101  |  19  ----------------------------<  241<H>  3.9   |  22  |  1.14    Ca    8.6      08 Feb 2023 01:01    TPro  6.4  /  Alb  3.8  /  TBili  0.8  /  DBili  x   /  AST  28  /  ALT  22  /  AlkPhos  51  02-06        PT/INR - ( 06 Feb 2023 22:12 )   PT: 11.7 sec;   INR: 1.02 ratio         PTT - ( 06 Feb 2023 22:12 )  PTT:29.1 sec    Creatinine Trend: 1.14<--, 0.94<--, 0.87<--, 0.89<--  I&O's Summary    07 Feb 2023 07:01  -  08 Feb 2023 07:00  --------------------------------------------------------  IN: 850 mL / OUT: 4400 mL / NET: -3550 mL      BNP    RADIOLOGY & ADDITIONAL STUDIES:             Internal Medicine   Cristino San| PGY-1    OVERNIGHT EVENTS:  No acute event overnight    SUBJECTIVE:   Feels well this AM  Denies headache, dizziness, weakness    MEDICATIONS  (STANDING):  atorvastatin 40 milliGRAM(s) Oral at bedtime  clopidogrel Tablet 75 milliGRAM(s) Oral daily  dextrose 5%. 1000 milliLiter(s) (50 mL/Hr) IV Continuous <Continuous>  dextrose 5%. 1000 milliLiter(s) (100 mL/Hr) IV Continuous <Continuous>  dextrose 5%. 1000 milliLiter(s) (125 mL/Hr) IV Continuous <Continuous>  dextrose 50% Injectable 25 Gram(s) IV Push once  dextrose 50% Injectable 12.5 Gram(s) IV Push once  dextrose 50% Injectable 25 Gram(s) IV Push once  glucagon  Injectable 1 milliGRAM(s) IntraMuscular once  insulin lispro (ADMELOG) corrective regimen sliding scale   SubCutaneous three times a day before meals  insulin lispro (ADMELOG) corrective regimen sliding scale   SubCutaneous at bedtime  lisinopril 40 milliGRAM(s) Oral daily  meropenem  IVPB      meropenem  IVPB 1000 milliGRAM(s) IV Intermittent every 8 hours    MEDICATIONS  (PRN):  dextrose Oral Gel 15 Gram(s) Oral once PRN Blood Glucose LESS THAN 70 milliGRAM(s)/deciliter        T(F): 98.4 (02-08-23 @ 05:19), Max: 98.5 (02-07-23 @ 07:35)  HR: 72 (02-08-23 @ 05:19) (72 - 86)  BP: 122/60 (02-08-23 @ 05:19) (112/68 - 145/74)  BP(mean): --  RR: 18 (02-08-23 @ 05:19) (18 - 20)  SpO2: 97% (02-08-23 @ 05:19) (96% - 100%)    PHYSICAL EXAM:     GENERAL: NAD, lying in bed comfortably.  HEAD:  Atraumatic, normocephalic.  EYES: EOMI, PERRLA, conjunctiva and sclera clear, no nystagmus noted.  ENT: Moist mucous membranes,   NECK: Supple. No JVD. Trachea midline.  CHEST/LUNG: CTAB. No rales, rhonchi, wheezing, or rubs. Unlabored respirations.  HEART: RRR, no M/R/G, normal S1/S2.  ABDOMEN: Soft, nontender, nondistended, no organomegaly. Normoactive bowel sounds.  EXTREMITIES:  2+ peripheral pulses b/l, brisk capillary refill. No clubbing, cyanosis, or edema.  MSK: No gross deformities noted.   NEURO:  AAOx3, no focal deficits.   SKIN: No rashes or lesions.  PSYCH: Normal mood, affect.    TELEMETRY:    LABS:                        12.9   14.51 )-----------( 219      ( 06 Feb 2023 22:12 )             37.0     02-08    133<L>  |  101  |  19  ----------------------------<  241<H>  3.9   |  22  |  1.14    Ca    8.6      08 Feb 2023 01:01    TPro  6.4  /  Alb  3.8  /  TBili  0.8  /  DBili  x   /  AST  28  /  ALT  22  /  AlkPhos  51  02-06        PT/INR - ( 06 Feb 2023 22:12 )   PT: 11.7 sec;   INR: 1.02 ratio         PTT - ( 06 Feb 2023 22:12 )  PTT:29.1 sec    Creatinine Trend: 1.14<--, 0.94<--, 0.87<--, 0.89<--  I&O's Summary    07 Feb 2023 07:01  -  08 Feb 2023 07:00  --------------------------------------------------------  IN: 850 mL / OUT: 4400 mL / NET: -3550 mL      BNP    RADIOLOGY & ADDITIONAL STUDIES:

## 2023-02-08 NOTE — PROGRESS NOTE ADULT - ASSESSMENT
A/P: 72 year old male with PMH of HTN, DM type II, HLD, CVA (2014), BPH, elevated PSA (13.8), s/p transperineal prostate biopsy by Dr. Ramos p/w vomiting and chills. Reed placed after procedure in the office. No post-op hematoma. Hyponatremia improving today to 131 from 119 with IVF management by nephrology.   Afebrile with stable vitals, perineal exam not concerning with CT evidence of no perineal/prostate abscess.   Blood cultures no growth to date, urine culture with 10-49k Ecoli.     - The biopsy in the office of the prostate was uncomplicated, bleeding seen in stool likely 2/2 rectal ultrasound probe utilized during the procedure. There was minimal concern for biopsy needle going into the rectum during the biopsy. Post biopsy, patient went into retention, reed was placed in the office.   - Low concern for sepsis 2/2 protate biopsy   - F/u blood cultures and urine E.coli sensitivities   - Continue plavix in setting of clear urine  - Continue tamsulosin home medication   - May perform trial of void prior to discharge  - Management of Hyponatremia per primary team and nephrology  - Plan discussed with Dr. Richard Ayala [place] for [facility]  [address] [place] [address] [phone]   A/P: 72 year old male with PMH of HTN, DM type II, HLD, CVA (2014), BPH, elevated PSA (13.8), s/p transperineal prostate biopsy by Dr. Ramos p/w vomiting and chills. Ered placed after procedure in the office. No post-op hematoma. Hyponatremia improving today to 131 from 119 with IVF management by nephrology.   Afebrile with stable vitals, perineal exam not concerning with CT evidence of no perineal/prostate abscess.   Blood cultures no growth to date, urine culture with 10-49k Ecoli.     - The biopsy in the office of the prostate was uncomplicated, bleeding seen in stool likely 2/2 rectal ultrasound probe utilized during the procedure. There was minimal concern for biopsy needle going into the rectum during the biopsy. Post biopsy, patient went into retention, reed was placed in the office.   - Low concern for sepsis 2/2 protate biopsy   - F/u blood cultures and urine E.coli sensitivities   - Continue plavix in setting of clear urine  - Please restart tamsulosin home medication   - May perform trial of void prior to discharge  - Management of Hyponatremia per primary team and nephrology  - Plan discussed with Dr. Richard Ayala [place] for Urology  [address] [place] [address] [phone]   A/P: 72 year old male with PMH of HTN, DM type II, HLD, CVA (2014), BPH, elevated PSA (13.8), s/p transperineal prostate biopsy by Dr. Ramos p/w vomiting and chills. Reed placed after procedure in the office. No post-op hematoma. Hyponatremia improving today to 131 from 119 with IVF management by nephrology.   Afebrile with stable vitals, perineal exam not concerning with CT evidence of no perineal/prostate abscess.   Blood cultures no growth to date, urine culture with 10-49k Ecoli.     - The biopsy in the office of the prostate was uncomplicated, bleeding seen in stool likely 2/2 rectal ultrasound probe utilized during the procedure. There was minimal concern for biopsy needle going into the rectum during the biopsy. Post biopsy, patient went into retention, reed was placed in the office.   - Low concern for sepsis 2/2 protate biopsy   - F/u blood cultures and urine E.coli sensitivities   - Continue plavix in setting of clear urine  - Please restart tamsulosin home medication   - May perform trial of void prior to discharge  - Management of Hyponatremia per primary team and nephrology  - Patient to follow up with Dr. Ramos in 1 week to evaluate PVR and discuss prostate biopsy results   - Please call urology with further questions    Plan discussed with Dr. Ramos  [facility]  [address] [place] [phone]

## 2023-02-08 NOTE — DISCHARGE NOTE PROVIDER - NSFOLLOWUPCLINICS_GEN_ALL_ED_FT
[facility] [place]  Internal Medicine  [address] [place] [address]  Phone: [phone]  Fax: [phone]  Follow Up Time: 2 weeks

## 2023-02-08 NOTE — PROGRESS NOTE ADULT - PROBLEM SELECTOR PLAN 3
Had 5 episodes of n/v prior to coming to ED. Resolved. May have been due to pain fro prostate biopsy. May have been after operation.   - continue to monitor  - if nausea, can do Zofran 4mg q8h PRN

## 2023-02-08 NOTE — DISCHARGE NOTE PROVIDER - NSDCFUSCHEDAPPT_GEN_ALL_CORE_FT
Deondre Ramos  [facility]  [facility]  Scheduled Appointment: 02/09/2023

## 2023-02-08 NOTE — DISCHARGE NOTE PROVIDER - NSDCCPCAREPLAN_GEN_ALL_CORE_FT
PRINCIPAL DISCHARGE DIAGNOSIS  Diagnosis: Hyponatremia  Assessment and Plan of Treatment:   Hyponatremia occurs when the amount of sodium (salt) in your blood is lower than normal. Sodium is an electrolyte (mineral) that helps your muscles, heart, and digestive system work properly. It helps control blood pressure and fluid balance.  DISCHARGE INSTRUCTIONS:  Follow up with your healthcare provider as directed: You may need to return for more tests. Write down your questions so you remember to ask them during your visits.  Nutrition: You may need to increase your intake of sodium. Foods that are high in sodium include milk, packaged snacks such as pretzels, or processed meats (miranda, sausage, and ham). Ask your dietitian to help you create a meal plan that is right for you.  Liquids: Follow your healthcare provider's advice if you need to limit the amount of liquid you drink. Ask how much liquid to drink each day and which liquids are best for you. You may be asked to drink liquids that have water, sugar, and salt, such as juices, milk, or sports drinks. These liquids help your body hold in fluid and prevent dehydration.  Contact your healthcare provider if:   •You have muscle cramps or twitching.  •You feel very weak or tired.  •You have nausea or are vomiting.  •You have questions or concerns about your condition or care.  Seek care immediately or call 911 if:   •You have a seizure.  •You have an irregular heartbeat.  •You have trouble breathing.  •You cannot move your arms and legs.  •You are confused or cannot think clearly.        SECONDARY DISCHARGE DIAGNOSES  Diagnosis: Nausea & vomiting  Assessment and Plan of Treatment: Nausea is feeling that you have an upset stomach and that you are about to vomit. Vomiting is when food in your stomach forcefully comes out of your mouth. Vomiting can make you feel weak. If you vomit, or if you are not able to drink enough fluids, you may not have enough water in your body (get dehydrated). If you do not have enough water in your body, you may:  •Feel tired.   •Feel thirsty.   •Have a dry mouth.  •Have cracked lips.  •Pee (urinate) less often.  Older adults and people with other diseases or a weak body defense system (immune system) are at higher risk for not having enough water in the body. If you feel like you may vomit or you vomit, it is important to follow instructions from your doctor about how to take care of yourself.  Follow these instructions at home:  Watch your symptoms for any changes. Tell your doctor about them.  Eating and drinking   A bottle of clear fruit juice and glass of water.   A sign showing that a person should not drink alcohol.   •Take an ORS (oral rehydration solution). This is a drink that is sold at pharmacies and stores.  •Drink clear fluids in small amounts as you are able, such as:  •Water.  •Ice chips  •Fruit juice that has water added (diluted fruit juice).  •Low-calorie sports drinks.  •Eat bland, easy-to-digest foods in small amounts as you are able, such as:  •Bananas.  •Applesauce.  •Rice.  •Low-fat (lean) meats.  •Toast.  •Crackers.  •Avoid drinking fluids that have a lot of sugar or caffeine in them. This includes energy drinks, sports drinks, and soda.  •Avoid alcohol.  •Avoid spicy or fatty foods.  General instructions   •Take over-the-counter and prescription medicines only as told by your doctor.  •Drink enough fluid to keep your pee (urine) pale yellow.  •Wash your hands often with soap and water for at least 20 seconds. If you cannot use soap and water, use hand .  •Make sure that everyone in your home washes their hands well and often.  •Rest at home     PRINCIPAL DISCHARGE DIAGNOSIS  Diagnosis: Hyponatremia  Assessment and Plan of Treatment: We found that your sodioum was severely low when you came in, which was consistent wioth the symptoms you were experiencing. We obtained blood and urine studies to determine the cause of the low sodium. We gtave you boliuses of sodium chloride to correct this, which worked very well. We recommend continmuing to stay hydrated and eating nutrient rich, moderate sodium, foods. We recommend following up wityh your PCP for further care.

## 2023-02-08 NOTE — PHYSICAL THERAPY INITIAL EVALUATION ADULT - BALANCE TRAINING, PT EVAL
Goal: Pt will improve balance one half grade to improve performance and safety of transfers and ambulation in 2-weeks.

## 2023-02-08 NOTE — PHYSICAL THERAPY INITIAL EVALUATION ADULT - PERTINENT HX OF CURRENT PROBLEM, REHAB EVAL
73 yo M PMH T2DM, HTN, HLD, BPH presenting with vomiting and chills that began after his prostate biopsy with Dr. Deondre Ramos of Urology on 02/06. Pt had an episode of vomiting after prostate biopsy; went home then also had 4 episodes of vomiting and chills. Pt's wife says speech was slurred after she picked him up and took him home; had a BM at home covered with a streak of bright red blood. Wife brought pt to ER for his vomiting, chills and slurred speech. In ED, pt received Vancomyin, Zosyn x1, 1L NS and underwent CT Head, A/P and Chest Angio, all of which were negative for any acute processes. Afebrile, nontachycardic and normotensive in ED. CT CHEST: No pulmonary embolism in the main, lobar, segmental or upper lobe subsegmental pulmonary arteries. CT ABDOMEN/PELVIS: No bowel obstruction or inflammation. Urology consult (02/07/23): biopsy of prostate uncomplicated, bleeding seen in stool likely 2/2 rectal ultrasound probe utilized during the procedure. Minimal concern for biopsy needle going into the rectum during the biopsy. Post biopsy, patient went into retention, reed was placed in the office. Nephrology consult (02/07/23): for overcorrected hyponatremia.

## 2023-02-09 ENCOUNTER — APPOINTMENT (OUTPATIENT)
Dept: UROLOGY | Facility: CLINIC | Age: 73
End: 2023-02-09

## 2023-02-09 ENCOUNTER — TRANSCRIPTION ENCOUNTER (OUTPATIENT)
Age: 73
End: 2023-02-09

## 2023-02-09 VITALS
DIASTOLIC BLOOD PRESSURE: 80 MMHG | TEMPERATURE: 98 F | OXYGEN SATURATION: 96 % | SYSTOLIC BLOOD PRESSURE: 150 MMHG | HEART RATE: 83 BPM | RESPIRATION RATE: 19 BRPM

## 2023-02-09 LAB
ANION GAP SERPL CALC-SCNC: 10 MMOL/L — SIGNIFICANT CHANGE UP (ref 5–17)
BUN SERPL-MCNC: 14 MG/DL — SIGNIFICANT CHANGE UP (ref 7–23)
CALCIUM SERPL-MCNC: 8.5 MG/DL — SIGNIFICANT CHANGE UP (ref 8.4–10.5)
CHLORIDE SERPL-SCNC: 100 MMOL/L — SIGNIFICANT CHANGE UP (ref 96–108)
CO2 SERPL-SCNC: 24 MMOL/L — SIGNIFICANT CHANGE UP (ref 22–31)
CREAT SERPL-MCNC: 1.08 MG/DL — SIGNIFICANT CHANGE UP (ref 0.5–1.3)
EGFR: 72 ML/MIN/1.73M2 — SIGNIFICANT CHANGE UP
GLUCOSE BLDC GLUCOMTR-MCNC: 182 MG/DL — HIGH (ref 70–99)
GLUCOSE BLDC GLUCOMTR-MCNC: 246 MG/DL — HIGH (ref 70–99)
GLUCOSE SERPL-MCNC: 182 MG/DL — HIGH (ref 70–99)
POTASSIUM SERPL-MCNC: 3.9 MMOL/L — SIGNIFICANT CHANGE UP (ref 3.5–5.3)
POTASSIUM SERPL-SCNC: 3.9 MMOL/L — SIGNIFICANT CHANGE UP (ref 3.5–5.3)
SODIUM SERPL-SCNC: 134 MMOL/L — LOW (ref 135–145)

## 2023-02-09 PROCEDURE — 70450 CT HEAD/BRAIN W/O DYE: CPT | Mod: MA

## 2023-02-09 PROCEDURE — 97162 PT EVAL MOD COMPLEX 30 MIN: CPT

## 2023-02-09 PROCEDURE — 85025 COMPLETE CBC W/AUTO DIFF WBC: CPT

## 2023-02-09 PROCEDURE — 87186 SC STD MICRODIL/AGAR DIL: CPT

## 2023-02-09 PROCEDURE — 80048 BASIC METABOLIC PNL TOTAL CA: CPT

## 2023-02-09 PROCEDURE — 97110 THERAPEUTIC EXERCISES: CPT

## 2023-02-09 PROCEDURE — 82962 GLUCOSE BLOOD TEST: CPT

## 2023-02-09 PROCEDURE — 71275 CT ANGIOGRAPHY CHEST: CPT | Mod: MA

## 2023-02-09 PROCEDURE — 82570 ASSAY OF URINE CREATININE: CPT

## 2023-02-09 PROCEDURE — 85018 HEMOGLOBIN: CPT

## 2023-02-09 PROCEDURE — 84540 ASSAY OF URINE/UREA-N: CPT

## 2023-02-09 PROCEDURE — 84132 ASSAY OF SERUM POTASSIUM: CPT

## 2023-02-09 PROCEDURE — 82330 ASSAY OF CALCIUM: CPT

## 2023-02-09 PROCEDURE — 87040 BLOOD CULTURE FOR BACTERIA: CPT

## 2023-02-09 PROCEDURE — 96374 THER/PROPH/DIAG INJ IV PUSH: CPT

## 2023-02-09 PROCEDURE — 86850 RBC ANTIBODY SCREEN: CPT

## 2023-02-09 PROCEDURE — 84484 ASSAY OF TROPONIN QUANT: CPT

## 2023-02-09 PROCEDURE — 80053 COMPREHEN METABOLIC PANEL: CPT

## 2023-02-09 PROCEDURE — 87637 SARSCOV2&INF A&B&RSV AMP PRB: CPT

## 2023-02-09 PROCEDURE — 74177 CT ABD & PELVIS W/CONTRAST: CPT | Mod: MA

## 2023-02-09 PROCEDURE — 83735 ASSAY OF MAGNESIUM: CPT

## 2023-02-09 PROCEDURE — 85014 HEMATOCRIT: CPT

## 2023-02-09 PROCEDURE — 55700: CPT

## 2023-02-09 PROCEDURE — 82435 ASSAY OF BLOOD CHLORIDE: CPT

## 2023-02-09 PROCEDURE — 99231 SBSQ HOSP IP/OBS SF/LOW 25: CPT | Mod: GC

## 2023-02-09 PROCEDURE — 82803 BLOOD GASES ANY COMBINATION: CPT

## 2023-02-09 PROCEDURE — 97530 THERAPEUTIC ACTIVITIES: CPT

## 2023-02-09 PROCEDURE — 84295 ASSAY OF SERUM SODIUM: CPT

## 2023-02-09 PROCEDURE — 83036 HEMOGLOBIN GLYCOSYLATED A1C: CPT

## 2023-02-09 PROCEDURE — 84156 ASSAY OF PROTEIN URINE: CPT

## 2023-02-09 PROCEDURE — 99239 HOSP IP/OBS DSCHRG MGMT >30: CPT

## 2023-02-09 PROCEDURE — 87086 URINE CULTURE/COLONY COUNT: CPT

## 2023-02-09 PROCEDURE — 84300 ASSAY OF URINE SODIUM: CPT

## 2023-02-09 PROCEDURE — 83935 ASSAY OF URINE OSMOLALITY: CPT

## 2023-02-09 PROCEDURE — 85027 COMPLETE CBC AUTOMATED: CPT

## 2023-02-09 PROCEDURE — 86803 HEPATITIS C AB TEST: CPT

## 2023-02-09 PROCEDURE — 81001 URINALYSIS AUTO W/SCOPE: CPT

## 2023-02-09 PROCEDURE — 96375 TX/PRO/DX INJ NEW DRUG ADDON: CPT

## 2023-02-09 PROCEDURE — 99285 EMERGENCY DEPT VISIT HI MDM: CPT

## 2023-02-09 PROCEDURE — 85730 THROMBOPLASTIN TIME PARTIAL: CPT

## 2023-02-09 PROCEDURE — 83690 ASSAY OF LIPASE: CPT

## 2023-02-09 PROCEDURE — 84133 ASSAY OF URINE POTASSIUM: CPT

## 2023-02-09 PROCEDURE — 86900 BLOOD TYPING SEROLOGIC ABO: CPT

## 2023-02-09 PROCEDURE — 82947 ASSAY GLUCOSE BLOOD QUANT: CPT

## 2023-02-09 PROCEDURE — 86901 BLOOD TYPING SEROLOGIC RH(D): CPT

## 2023-02-09 PROCEDURE — 85610 PROTHROMBIN TIME: CPT

## 2023-02-09 PROCEDURE — 83605 ASSAY OF LACTIC ACID: CPT

## 2023-02-09 PROCEDURE — 84100 ASSAY OF PHOSPHORUS: CPT

## 2023-02-09 PROCEDURE — 83930 ASSAY OF BLOOD OSMOLALITY: CPT

## 2023-02-09 PROCEDURE — 76942 ECHO GUIDE FOR BIOPSY: CPT | Mod: 59

## 2023-02-09 PROCEDURE — 36415 COLL VENOUS BLD VENIPUNCTURE: CPT

## 2023-02-09 RX ORDER — DESMOPRESSIN ACETATE 0.1 MG/1
2 TABLET ORAL ONCE
Refills: 0 | Status: COMPLETED | OUTPATIENT
Start: 2023-02-09 | End: 2023-02-09

## 2023-02-09 RX ADMIN — Medication 4: at 12:24

## 2023-02-09 RX ADMIN — LISINOPRIL 40 MILLIGRAM(S): 2.5 TABLET ORAL at 05:25

## 2023-02-09 RX ADMIN — DESMOPRESSIN ACETATE 2 MICROGRAM(S): 0.1 TABLET ORAL at 05:24

## 2023-02-09 RX ADMIN — Medication 2: at 08:52

## 2023-02-09 RX ADMIN — CLOPIDOGREL BISULFATE 75 MILLIGRAM(S): 75 TABLET, FILM COATED ORAL at 11:07

## 2023-02-09 RX ADMIN — MEROPENEM 100 MILLIGRAM(S): 1 INJECTION INTRAVENOUS at 05:24

## 2023-02-09 NOTE — CHART NOTE - NSCHARTNOTEFT_GEN_A_CORE
The physical therapist examined the patient and determined he would need a rolling walker for discharge due to decreased balance and safety with transfers and ambulation. I and the attending agree with this recommendation for the patient's enhanced mobility and welfare and to decrease the risk of hospitalization from falls.

## 2023-02-09 NOTE — PROGRESS NOTE ADULT - SUBJECTIVE AND OBJECTIVE BOX
[facility] DIVISION OF KIDNEY DISEASES AND HYPERTENSION -- FOLLOW UP NOTE  --------------------------------------------------------------------------------  HPI:  Pt. is a 72 y.o. M w/ PMHx of HTN, DM2, HLD, CVA (2014), BPH, elevated PSA (13.8), s/p transperineal prostate biopsy by Dr. Ramos yesterday presents c/o vomiting and chills. Nephrology consulted for overcorrected hyponatremia. Pt. denies having issues with sodium in the past and was eating ok on day of procedure. After the procedure (no anesthesia given) he had slurred speech, dysphasia, vomiting and chills. Also had Corbett placed after procedure but wife noticed decreased UOP. Pt. was nauseous vomiting and unable to tolerate PO intake. In the ER SNa was 117 which rapidly corrected to 130 after 1L NS. Pt states after fluids were given his neurological symptoms resolved. No on thiazide diuretic at home.     Pt/. seen this AM states over all feels better. no neuro symptoms. SNa trended to 134, given DDAVP overnight. Now off D5W.        PAST HISTORY  --------------------------------------------------------------------------------  No significant changes to PMH, PSH, FHx, SHx, unless otherwise noted    ALLERGIES & MEDICATIONS  --------------------------------------------------------------------------------  Allergies    [This allergen will not trigger allergy alert] contrast allergy (Unknown)  [This allergen will not trigger allergy alert] gadolinium (Unknown)  [This allergen will not trigger allergy alert] Gadolinium (Hives; Rash)  [This allergen will not trigger allergy alert] Iodinated Contrast Media (Rash)  aspirin (Hives)  shellfish (Unknown)  Zosyn (Hives)    Intolerances      Standing Inpatient Medications  atorvastatin 40 milliGRAM(s) Oral at bedtime  clopidogrel Tablet 75 milliGRAM(s) Oral daily  dextrose 5%. 1000 milliLiter(s) IV Continuous <Continuous>  dextrose 5%. 1000 milliLiter(s) IV Continuous <Continuous>  dextrose 50% Injectable 25 Gram(s) IV Push once  dextrose 50% Injectable 12.5 Gram(s) IV Push once  dextrose 50% Injectable 25 Gram(s) IV Push once  glucagon  Injectable 1 milliGRAM(s) IntraMuscular once  insulin lispro (ADMELOG) corrective regimen sliding scale   SubCutaneous three times a day before meals  insulin lispro (ADMELOG) corrective regimen sliding scale   SubCutaneous at bedtime  lisinopril 40 milliGRAM(s) Oral daily  tamsulosin 0.4 milliGRAM(s) Oral at bedtime    PRN Inpatient Medications  dextrose Oral Gel 15 Gram(s) Oral once PRN      REVIEW OF SYSTEMS  --------------------------------------------------------------------------------  As per HPI    VITALS/PHYSICAL EXAM  --------------------------------------------------------------------------------  T(C): 36.8 (02-09-23 @ 06:32), Max: 37 (02-08-23 @ 20:27)  HR: 70 (02-09-23 @ 06:32) (70 - 80)  BP: 154/73 (02-09-23 @ 06:32) (135/68 - 154/73)  RR: 18 (02-09-23 @ 06:32) (18 - 18)  SpO2: 96% (02-09-23 @ 06:32) (95% - 96%)  Wt(kg): --        02-08-23 @ 07:01  -  02-09-23 @ 07:00  --------------------------------------------------------  IN: 1300 mL / OUT: 4300 mL / NET: -3000 mL        Physical Exam:  	Gen: NAD  	HEENT: MMM  	Pulm: CTA B/L  	CV: S1S2  	Abd: Soft, +BS, Corbett +   	Ext: No LE edema B/L  	Neuro: Awake, ano asterixis   	Skin: Warm and dry  	Vascular access: peripheral     LABS/STUDIES  --------------------------------------------------------------------------------              12.7   10.78 >-----------<  224      [02-08-23 @ 07:11]              37.3     134  |  100  |  14  ----------------------------<  182      [02-09-23 @ 02:28]  3.9   |  24  |  1.08        Ca     8.5     [02-09-23 @ 02:28]      Mg     1.8     [02-08-23 @ 07:13]      Phos  3.1     [02-08-23 @ 07:13]    TPro  5.9  /  Alb  3.4  /  TBili  0.2  /  DBili  x   /  AST  19  /  ALT  18  /  AlkPhos  43  [02-08-23 @ 07:13]        Serum Osmolality 313      [02-07-23 @ 12:09]    Creatinine Trend:  SCr 1.08 [02-09 @ 02:28]  SCr 1.18 [02-08 @ 16:18]  SCr 1.10 [02-08 @ 07:13]  SCr 1.14 [02-08 @ 01:01]  SCr 0.94 [02-07 @ 12:09]    Urinalysis - [02-06-23 @ 22:39]      Color Colorless / Appearance Clear / SG 1.020 / pH 6.0      Gluc 1000 mg/dL / Ketone Trace  / Bili Negative / Urobili Negative       Blood Large / Protein 30 mg/dL / Leuk Est Small / Nitrite Negative       / WBC 9 / Hyaline 1 / Gran  / Sq Epi  / Non Sq Epi 0 / Bacteria Negative    Urine Creatinine 9      [02-07-23 @ 08:44]  Urine Protein <7      [02-07-23 @ 08:44]  Urine Sodium 24      [02-07-23 @ 08:44]  Urine Urea Nitrogen 105      [02-07-23 @ 08:44]  Urine Potassium 8      [02-07-23 @ 08:44]  Urine Osmolality 146      [02-07-23 @ 08:44]      HCV 0.05, Nonreact      [02-08-23 @ 07:19]

## 2023-02-09 NOTE — PROGRESS NOTE ADULT - PROBLEM SELECTOR PLAN 5
On Lisinopril 40mg daily  - c/w Lisinopril Diet: CCB  DVT PPx: Lovenox 40mg daily  Ethics: full code  Dispo: home with home PT

## 2023-02-09 NOTE — PROGRESS NOTE ADULT - SUBJECTIVE AND OBJECTIVE BOX
PROGRESS NOTE:     Patient is a 72y old  Male who presents with a chief complaint of vomiting (08 Feb 2023 14:11)      SUBJECTIVE / OVERNIGHT EVENTS:  No headache, weakness, or vision changes    MEDICATIONS  (STANDING):  atorvastatin 40 milliGRAM(s) Oral at bedtime  clopidogrel Tablet 75 milliGRAM(s) Oral daily  dextrose 5%. 1000 milliLiter(s) (50 mL/Hr) IV Continuous <Continuous>  dextrose 5%. 1000 milliLiter(s) (100 mL/Hr) IV Continuous <Continuous>  dextrose 50% Injectable 25 Gram(s) IV Push once  dextrose 50% Injectable 12.5 Gram(s) IV Push once  dextrose 50% Injectable 25 Gram(s) IV Push once  glucagon  Injectable 1 milliGRAM(s) IntraMuscular once  insulin lispro (ADMELOG) corrective regimen sliding scale   SubCutaneous three times a day before meals  insulin lispro (ADMELOG) corrective regimen sliding scale   SubCutaneous at bedtime  lisinopril 40 milliGRAM(s) Oral daily  meropenem  IVPB      meropenem  IVPB 1000 milliGRAM(s) IV Intermittent every 8 hours  tamsulosin 0.4 milliGRAM(s) Oral at bedtime    MEDICATIONS  (PRN):  dextrose Oral Gel 15 Gram(s) Oral once PRN Blood Glucose LESS THAN 70 milliGRAM(s)/deciliter      CAPILLARY BLOOD GLUCOSE      POCT Blood Glucose.: 182 mg/dL (09 Feb 2023 08:21)  POCT Blood Glucose.: 177 mg/dL (08 Feb 2023 21:56)  POCT Blood Glucose.: 232 mg/dL (08 Feb 2023 17:01)  POCT Blood Glucose.: 183 mg/dL (08 Feb 2023 12:31)    I&O's Summary    08 Feb 2023 07:01  -  09 Feb 2023 07:00  --------------------------------------------------------  IN: 1300 mL / OUT: 4300 mL / NET: -3000 mL        PHYSICAL EXAM:  Vital Signs Last 24 Hrs  T(C): 36.8 (09 Feb 2023 06:32), Max: 37 (08 Feb 2023 20:27)  T(F): 98.2 (09 Feb 2023 06:32), Max: 98.6 (08 Feb 2023 20:27)  HR: 70 (09 Feb 2023 06:32) (70 - 80)  BP: 154/73 (09 Feb 2023 06:32) (135/68 - 154/73)  BP(mean): --  RR: 18 (09 Feb 2023 06:32) (18 - 18)  SpO2: 96% (09 Feb 2023 06:32) (95% - 96%)    Parameters below as of 09 Feb 2023 06:32  Patient On (Oxygen Delivery Method): room air        CONSTITUTIONAL: NAD, well-developed  RESPIRATORY: Normal respiratory effort; lungs are clear to auscultation bilaterally  CARDIOVASCULAR: Regular rate and rhythm, normal S1 and S2, no murmur/rub/gallop; No lower extremity edema; Peripheral pulses are 2+ bilaterally  ABDOMEN: Nontender to palpation, normoactive bowel sounds, no rebound/guarding; No hepatosplenomegaly  MUSCLOSKELETAL: no clubbing or cyanosis of digits; no joint swelling or tenderness to palpation  NEURO: CN 2-12 grossly intact, moves all limbs spontaneously  PSYCH: A+O to person, place, and time; affect appropriate    LABS:                        12.7   10.78 )-----------( 224      ( 08 Feb 2023 07:11 )             37.3     02-09    134<L>  |  100  |  14  ----------------------------<  182<H>  3.9   |  24  |  1.08    Ca    8.5      09 Feb 2023 02:28  Phos  3.1     02-08  Mg     1.8     02-08    TPro  5.9<L>  /  Alb  3.4  /  TBili  0.2  /  DBili  x   /  AST  19  /  ALT  18  /  AlkPhos  43  02-08              Culture - Urine (collected 06 Feb 2023 22:39)  Source: Clean Catch Clean Catch (Midstream)  Preliminary Report (07 Feb 2023 22:32):    10,000 - 49,000 CFU/mL Escherichia coli    Culture - Blood (collected 06 Feb 2023 19:38)  Source: .Blood Blood-Peripheral  Preliminary Report (08 Feb 2023 02:02):    No growth to date.    Culture - Blood (collected 06 Feb 2023 19:30)  Source: .Blood Blood-Peripheral  Preliminary Report (08 Feb 2023 02:02):    No growth to date.        RADIOLOGY & ADDITIONAL TESTS:  Results Reviewed: Y  Imaging Personally Reviewed: Y    COORDINATION OF CARE:  Care Discussed with Consultants/Other Providers [Y/N]: Y  Prior or Outpatient Records Reviewed [Y/N]: MARCO Burton PGY1  Can be reached on MS teams   PROGRESS NOTE:   Patient is a 72y old  Male who presents with a chief complaint of vomiting    SUBJECTIVE / OVERNIGHT EVENTS:  No headache, weakness, or vision changes    MEDICATIONS  (STANDING):  atorvastatin 40 milliGRAM(s) Oral at bedtime  clopidogrel Tablet 75 milliGRAM(s) Oral daily  dextrose 5%. 1000 milliLiter(s) (50 mL/Hr) IV Continuous <Continuous>  dextrose 5%. 1000 milliLiter(s) (100 mL/Hr) IV Continuous <Continuous>  dextrose 50% Injectable 25 Gram(s) IV Push once  dextrose 50% Injectable 12.5 Gram(s) IV Push once  dextrose 50% Injectable 25 Gram(s) IV Push once  glucagon  Injectable 1 milliGRAM(s) IntraMuscular once  insulin lispro (ADMELOG) corrective regimen sliding scale   SubCutaneous three times a day before meals  insulin lispro (ADMELOG) corrective regimen sliding scale   SubCutaneous at bedtime  lisinopril 40 milliGRAM(s) Oral daily  meropenem  IVPB      meropenem  IVPB 1000 milliGRAM(s) IV Intermittent every 8 hours  tamsulosin 0.4 milliGRAM(s) Oral at bedtime    MEDICATIONS  (PRN):  dextrose Oral Gel 15 Gram(s) Oral once PRN Blood Glucose LESS THAN 70 milliGRAM(s)/deciliter    CAPILLARY BLOOD GLUCOSE  POCT Blood Glucose.: 182 mg/dL (09 Feb 2023 08:21)  POCT Blood Glucose.: 177 mg/dL (08 Feb 2023 21:56)  POCT Blood Glucose.: 232 mg/dL (08 Feb 2023 17:01)  POCT Blood Glucose.: 183 mg/dL (08 Feb 2023 12:31)    I&O's Summary  08 Feb 2023 07:01  -  09 Feb 2023 07:00  --------------------------------------------------------  IN: 1300 mL / OUT: 4300 mL / NET: -3000 mL    Vital Signs Last 24 Hrs  T(C): 36.8 (09 Feb 2023 06:32), Max: 37 (08 Feb 2023 20:27)  T(F): 98.2 (09 Feb 2023 06:32), Max: 98.6 (08 Feb 2023 20:27)  HR: 70 (09 Feb 2023 06:32) (70 - 80)  BP: 154/73 (09 Feb 2023 06:32) (135/68 - 154/73)  RR: 18 (09 Feb 2023 06:32) (18 - 18)  SpO2: 96% (09 Feb 2023 06:32) (95% - on room air    PHYSICAL EXAM:  GENERAL: NAD, lying in bed comfortably.  HEAD:  Atraumatic, normocephalic.  EYES: EOMI, PERRLA, conjunctiva and sclera clear, no nystagmus noted.  ENT: Moist mucous membranes,   CHEST/LUNG: CTAB. No rales, rhonchi, wheezing, or rubs. Unlabored respirations.  HEART: RRR, no M/R/G, normal S1/S2.  ABDOMEN: Soft, nontender, nondistended, no organomegaly. Normoactive bowel sounds.  EXTREMITIES:  2+ peripheral pulses b/l, brisk capillary refill. No clubbing, cyanosis, or edema.  MSK: No gross deformities noted.   NEURO:  AAOx3, no focal deficits.     LABS:             12.7   10.78 )-----------( 224      ( 08 Feb 2023 07:11 )             37.3     134<L>  |  100  |  14  ----------------------------<  182<H>  3.9   |  24  |  1.08    Ca    8.5      09 Feb 2023 02:28  Phos  3.1     02-08  Mg     1.8     02-08    TPro  5.9<L>  /  Alb  3.4  /  TBili  0.2  /  DBili  x   /  AST  19  /  ALT  18  /  AlkPhos  43  02-08    Osmolality, Random Urine (02.07.23 @ 08:44)    Osmolality, Random Urine: 146 mos/kg  Sodium, Random Urine (02.07.23 @ 08:44)    Sodium, Random Urine: 24    Culture - Urine (collected 06 Feb 2023 22:39)  Preliminary Report (07 Feb 2023 22:32):    10,000 - 49,000 CFU/mL Escherichia coli    Culture - Blood (collected 06 Feb 2023 19:38)  Preliminary Report (08 Feb 2023 02:02):    No growth to date.    Culture - Blood (collected 06 Feb 2023 19:30)  Preliminary Report (08 Feb 2023 02:02):    No growth to date.    COORDINATION OF CARE:  Care Discussed with Consultants/Other Providers [Y/N]: Y  Prior or Outpatient Records Reviewed [Y/N]: MARCO Burton PGY1  Can be reached on MS teams

## 2023-02-09 NOTE — PROGRESS NOTE ADULT - PROBLEM SELECTOR PLAN 3
Had 5 episodes of n/v prior to coming to ED. Resolved. May have been due to pain fro prostate biopsy. May have been after operation.   - continue to monitor  - if nausea, can do Zofran 4mg q8h PRN On Metformin 1000mg daily outpatient  - A1C 7.4  - on ISS, 16U given over last 24hrs  - on consistent carb diet

## 2023-02-09 NOTE — PROGRESS NOTE ADULT - PROBLEM SELECTOR PLAN 4
On Metformin 1000mg daily outpatient  - check A1c  - sliding scale insulin  - Consistent Carb diet On Lisinopril 40mg daily  - c/w Lisinopril

## 2023-02-09 NOTE — PHARMACOTHERAPY INTERVENTION NOTE - COMMENTS
Spoke to patient about indication, directions, and side effect profile of medications. Informed patient of anticoagulant use in hospital, formulary changes, and other changes made in hospital. Inpatient medication list provided.     atorvastatin 40 mg oral tablet: 1 tab(s) orally once a day (at bedtime)  lisinopril 40 mg oral tablet: 1 tab(s) orally once a day  Plavix 75 mg oral tablet: 1 tab(s) orally once a day  tamsulosin 0.4 mg oral capsule: 1 cap(s) orally once a day    -----------------  Counseled pt that he should follow up with primary care upon discharge. Notified pt that he was on insulin sliding scale instead of using oral metformin and glimepiride, but he may be going back to taking oral medications instead of insulin.     Addi (Harpreet Schaeffer  Pharmacist - 6 MON/ 6 TOW   Available on [employer] Teams

## 2023-02-09 NOTE — PROGRESS NOTE ADULT - PROBLEM SELECTOR PLAN 1
Pt. admitted with SNa of 117 which rapidly corrected to 130 after 1L NS. SOsm 313 ( obtained with SNA of 130) indicated hyper osmolar state. Unclear if Pt. received Sorbitol or Mannitol during prostate biopsy procedure. UOSm of 146 indicates that SNa will correct on its own. Last SNa 134. Given DDAVP overnight. Now off D5W. SNa will correct on its own/. No objections to discharge today.    Avoid thiazide diuretics. Avoid NSAIDs.       If you have any questions, please feel free to contact me  Deondre Menjivar  Nephrology Fellow  [phone]; Prefer [employer] TEAMS  (After 5pm or on weekends please page the on-call fellow).

## 2023-02-09 NOTE — DISCHARGE NOTE NURSING/CASE MANAGEMENT/SOCIAL WORK - PATIENT PORTAL LINK FT
You can access the FollowMyHealth Patient Portal offered by [facility] by registering at the following website: [email or web address]. By joining [facility]’s FollowMyHealth portal, you will also be able to view your health information using other applications (apps) compatible with our system.

## 2023-02-09 NOTE — PROGRESS NOTE ADULT - PROBLEM SELECTOR PLAN 2
Came in with Na 117 (corrected 119) with repeat 119. S/p 1L fluids and maintenance. Unlikely to be hypovolemic hyponatremia as it wasn't responsive to fluids. Latest Na 131, overcorrection  - consult Nephrology   - D5W 150cc/hr for now  - avoid Na above 134 - afebrile and WBC WNL; no signs of sepsis  - BCx NGTD, UCx with < [id number] CFU E. coli  - will discontinue meropenem

## 2023-02-09 NOTE — PROGRESS NOTE ADULT - ATTENDING COMMENTS
I have seen this patient with the fellow and agree with their assessment and plan. I was physically present for significant portions of the evaluation and management (E/M) service provided.  I agree with the above history, physical, and plan which I have reviewed and edited where appropriate.    Na 134 and stable  ok to dc home today with PCP seda  nephrology will sign off    Lakhwinder Qureshi MD  Pager   Office     Contact me directly via [employer] Teams       I shall be away tomorrow, Dr Allyson Vickers is covering the service  For weekend coverage, please call Dr Skip Sales or Dr Adelina Camp
keep Na 130-132 range today with D5W  if Na tomorrow 130-135, ok to dc on nothing except brianna Qureshi MD  Pager   Office     Contact me directly via [employer] Teams     (After 5 pm or on weekends please page the on-call fellow/attending, can check [email or web address] for schedule. Login is sarah cross, schedule under [facility] medicine, psych, derm)
Agree with assessment and plan.   Await biopsy results  Appreciate medical management of hyponatremia
73 Y/O M w/  T2DM, HTN, HLD, BPH presenting for nausea/vomting, chills after his prostate biopsy, found to be hyponatremic at 119.     Sodium overcorrected, now on D5, monitor.     Voiding trial today.
73 Y/O M w/  T2DM, HTN, HLD, BPH presenting for nausea/vomting, chills after his prostate biopsy, found to be hyponatremic at 119.     Now resolved, passed voiding trial. D/c w f/u. D/c time 40 mins.

## 2023-02-09 NOTE — DISCHARGE NOTE NURSING/CASE MANAGEMENT/SOCIAL WORK - NSDCPEFALRISK_GEN_ALL_CORE
For information on Fall & Injury Prevention, visit: [email or web address] OR  [email or web address] OR  https://www.cdc.gov/steadi/patient.html

## 2023-02-09 NOTE — PROGRESS NOTE ADULT - PROBLEM SELECTOR PLAN 1
Underwent prostate biopsy yesterday 2/6. Developed nausea & vomiting after. Also experienced chills. Currently has a leukocytosis of 14.5. Is not febrile or hypotensive. Symptoms may be in s/o prostate biopsy leading to bacterial infiltration and infection. Would want to cover for anaerobes and gram negatives. Patient apparently had an adverse reaction to Zosyn with hives on face needing benadryl and steroids  - start Meropenem (2/7-); can continue until BCx come back  - follow-up BCx and UCx  - trend WBC and fever curve Came in with Na 117 (corrected 119) with repeat 119. S/p 1L fluids and maintenance.   - last Na 134  - per nephro, given response to fluids, Na will likely resolve on its own

## 2023-02-09 NOTE — PROGRESS NOTE ADULT - PROBLEM SELECTOR PLAN 6
Diet: CCB  DVT PPx: can do Lovenox 40mg daily  Ethics: full code  Dispo: pending PT
Diet: CCB  DVT PPx: can do Lovenox 40mg daily  Ethics: full code  Dispo: pending PT

## 2023-02-10 ENCOUNTER — NON-APPOINTMENT (OUTPATIENT)
Age: 73
End: 2023-02-10

## 2023-02-10 LAB
-  AMIKACIN: SIGNIFICANT CHANGE UP
-  AMOXICILLIN/CLAVULANIC ACID: SIGNIFICANT CHANGE UP
-  AMPICILLIN/SULBACTAM: SIGNIFICANT CHANGE UP
-  AMPICILLIN: SIGNIFICANT CHANGE UP
-  AZTREONAM: SIGNIFICANT CHANGE UP
-  CEFAZOLIN: SIGNIFICANT CHANGE UP
-  CEFEPIME: SIGNIFICANT CHANGE UP
-  CEFOXITIN: SIGNIFICANT CHANGE UP
-  CEFTRIAXONE: SIGNIFICANT CHANGE UP
-  CEFUROXIME: SIGNIFICANT CHANGE UP
-  CIPROFLOXACIN: SIGNIFICANT CHANGE UP
-  ERTAPENEM: SIGNIFICANT CHANGE UP
-  GENTAMICIN: SIGNIFICANT CHANGE UP
-  IMIPENEM: SIGNIFICANT CHANGE UP
-  LEVOFLOXACIN: SIGNIFICANT CHANGE UP
-  MEROPENEM: SIGNIFICANT CHANGE UP
-  NITROFURANTOIN: SIGNIFICANT CHANGE UP
-  PIPERACILLIN/TAZOBACTAM: SIGNIFICANT CHANGE UP
-  TOBRAMYCIN: SIGNIFICANT CHANGE UP
-  TRIMETHOPRIM/SULFAMETHOXAZOLE: SIGNIFICANT CHANGE UP
CULTURE RESULTS: SIGNIFICANT CHANGE UP
METHOD TYPE: SIGNIFICANT CHANGE UP
ORGANISM # SPEC MICROSCOPIC CNT: SIGNIFICANT CHANGE UP
ORGANISM # SPEC MICROSCOPIC CNT: SIGNIFICANT CHANGE UP
PROSTATE BIOPSY: NORMAL
SPECIMEN SOURCE: SIGNIFICANT CHANGE UP

## 2023-02-12 LAB
CULTURE RESULTS: SIGNIFICANT CHANGE UP
CULTURE RESULTS: SIGNIFICANT CHANGE UP
SPECIMEN SOURCE: SIGNIFICANT CHANGE UP
SPECIMEN SOURCE: SIGNIFICANT CHANGE UP

## 2023-02-15 ENCOUNTER — APPOINTMENT (OUTPATIENT)
Dept: INTERNAL MEDICINE | Facility: CLINIC | Age: 73
End: 2023-02-15
Payer: MEDICARE

## 2023-02-15 VITALS
HEIGHT: 66 IN | WEIGHT: 152 LBS | HEART RATE: 85 BPM | TEMPERATURE: 97.3 F | SYSTOLIC BLOOD PRESSURE: 141 MMHG | OXYGEN SATURATION: 98 % | DIASTOLIC BLOOD PRESSURE: 74 MMHG | BODY MASS INDEX: 24.43 KG/M2

## 2023-02-15 DIAGNOSIS — R20.2 PARESTHESIA OF SKIN: ICD-10-CM

## 2023-02-15 PROCEDURE — 99496 TRANSJ CARE MGMT HIGH F2F 7D: CPT

## 2023-02-15 NOTE — HISTORY OF PRESENT ILLNESS
[Post-hospitalization from ___ Hospital] : Post-hospitalization from [unfilled] Hospital [Admitted on: ___] : The patient was admitted on [unfilled] [Discharged on ___] : discharged on [unfilled] [Discharge Summary] : discharge summary [Pertinent Labs] : pertinent labs [Radiology Findings] : radiology findings [Discharge Med List] : discharge medication list [Med Reconciliation] : medication reconciliation has been completed [Patient Contacted By: ____] : and contacted by [unfilled] [FreeTextEntry2] : Presented with nausea/vomiting, confusion after prostate biopsy. Found to be hyponatremic, corrected in hospital and then discharged with instructions to follow-up with me. Feels well except for intermittent paresthesia in the RUE but no weakness.

## 2023-02-15 NOTE — ASSESSMENT
[FreeTextEntry1] : \par Hyponatremia: check sodium level today\par \par Paresthesias: no weakness on exam; suspect may have been electrolyte related but will refer to neurology for EMG testing and further evaluation

## 2023-02-15 NOTE — HEALTH RISK ASSESSMENT
[Intercurrent hospitalizations] : was admitted to the hospital  [No] : No [1 or 2 (0 pts)] : 1 or 2 (0 points) [Never (0 pts)] : Never (0 points) [No falls in past year] : Patient reported no falls in the past year [0] : 2) Feeling down, depressed, or hopeless: Not at all (0) [PHQ-2 Negative - No further assessment needed] : PHQ-2 Negative - No further assessment needed [Audit-CScore] : 0 [[id number]] : 0 [Change in mental status noted] : No change in mental status noted [Language] : denies difficulty with language [Behavior] : denies difficulty with behavior [Learning/Retaining New Information] : denies difficulty learning/retaining new information [Handling Complex Tasks] : denies difficulty handling complex tasks [Reasoning] : denies difficulty with reasoning [Spatial Ability and Orientation] : denies difficulty with spatial ability and orientation [None] : None [With Family] : lives with family [Retired] : retired [] :  [High Risk Behavior] : no high risk behavior [Feels Safe at Home] : Feels safe at home [Fully functional (bathing, dressing, toileting, transferring, walking, feeding)] : Fully functional (bathing, dressing, toileting, transferring, walking, feeding) [Fully functional (using the telephone, shopping, preparing meals, housekeeping, doing laundry, using] : Fully functional and needs no help or supervision to perform IADLs (using the telephone, shopping, preparing meals, housekeeping, doing laundry, using transportation, managing medications and managing finances) [Reports changes in hearing] : Reports no changes in hearing [Reports changes in vision] : Reports no changes in vision [Reports normal functional visual acuity (ie: able to read med bottle)] : Reports normal functional visual acuity [Reports changes in dental health] : Reports no changes in dental health [Smoke Detector] : smoke detector [Carbon Monoxide Detector] : carbon monoxide detector [Guns at Home] : no guns at home [Safety elements used in home] : safety elements used in home [Seat Belt] :  uses seat belt [Sunscreen] : uses sunscreen [Travel to Developing Areas] : does not  travel to developing areas [TB Exposure] : is not being exposed to tuberculosis [Caregiver Concerns] : does not have caregiver concerns [Reviewed no changes] : Reviewed, no changes [AdvancecareDate] : 2/23

## 2023-02-16 ENCOUNTER — APPOINTMENT (OUTPATIENT)
Dept: UROLOGY | Facility: CLINIC | Age: 73
End: 2023-02-16
Payer: MEDICARE

## 2023-02-16 VITALS
HEART RATE: 103 BPM | WEIGHT: 152 LBS | BODY MASS INDEX: 24.43 KG/M2 | DIASTOLIC BLOOD PRESSURE: 82 MMHG | HEIGHT: 66 IN | OXYGEN SATURATION: 95 % | SYSTOLIC BLOOD PRESSURE: 180 MMHG

## 2023-02-16 DIAGNOSIS — J30.9 ALLERGIC RHINITIS, UNSPECIFIED: ICD-10-CM

## 2023-02-16 DIAGNOSIS — R30.0 DYSURIA: ICD-10-CM

## 2023-02-16 DIAGNOSIS — E87.1 HYPO-OSMOLALITY AND HYPONATREMIA: ICD-10-CM

## 2023-02-16 LAB
ANION GAP SERPL CALC-SCNC: 15 MMOL/L
BUN SERPL-MCNC: 19 MG/DL
CALCIUM SERPL-MCNC: 9.4 MG/DL
CHLORIDE SERPL-SCNC: 100 MMOL/L
CO2 SERPL-SCNC: 21 MMOL/L
CREAT SERPL-MCNC: 1.07 MG/DL
EGFR: 73 ML/MIN/1.73M2
GLUCOSE SERPL-MCNC: 223 MG/DL
POTASSIUM SERPL-SCNC: 4.5 MMOL/L
SODIUM SERPL-SCNC: 136 MMOL/L

## 2023-02-16 PROCEDURE — 99214 OFFICE O/P EST MOD 30 MIN: CPT

## 2023-02-17 ENCOUNTER — NON-APPOINTMENT (OUTPATIENT)
Age: 73
End: 2023-02-17

## 2023-02-19 ENCOUNTER — RX RENEWAL (OUTPATIENT)
Age: 73
End: 2023-02-19

## 2023-02-19 LAB
APPEARANCE: CLEAR
BACTERIA UR CULT: ABNORMAL
BACTERIA: NEGATIVE
BILIRUBIN URINE: NEGATIVE
BLOOD URINE: ABNORMAL
COLOR: NORMAL
GLUCOSE QUALITATIVE U: ABNORMAL
HYALINE CASTS: 0 /LPF
KETONES URINE: NEGATIVE
LEUKOCYTE ESTERASE URINE: NEGATIVE
MICROSCOPIC-UA: NORMAL
NITRITE URINE: NEGATIVE
PH URINE: 6.5
PROTEIN URINE: NEGATIVE
RED BLOOD CELLS URINE: 1 /HPF
SPECIFIC GRAVITY URINE: 1.01
SQUAMOUS EPITHELIAL CELLS: 0 /HPF
UROBILINOGEN URINE: NORMAL
WHITE BLOOD CELLS URINE: 0 /HPF

## 2023-02-19 RX ORDER — SULFAMETHOXAZOLE AND TRIMETHOPRIM 800; 160 MG/1; MG/1
800-160 TABLET ORAL
Qty: 10 | Refills: 0 | Status: DISCONTINUED | COMMUNITY
Start: 2023-02-18 | End: 2023-02-19

## 2023-02-19 RX ORDER — BETAMETHASONE VALERATE 1 MG/G
0.1 CREAM TOPICAL TWICE DAILY
Qty: 45 | Refills: 5 | Status: ACTIVE | COMMUNITY
Start: 2022-02-08 | End: 1900-01-01

## 2023-02-21 NOTE — LETTER BODY
[FreeTextEntry1] : Betty Madison MD\par [address],\[place] [address]\par [phone]\par \par Dear Dr. Madison,\par \par Reason for visit: Prostate cancer. \par \par This is a 72 year old male with elevated PSA, status post prostate biopsy. The patient returns today to discuss the results of the biopsy. He was hospitalized after the procedure for hyponitremia.The etiology of his hyponatremia was unclear.  It may have been related to his fluid intake and emesis. He denies any fevers or chills. He denies any pain.Patient denies any changes in health. The past medical history and family history and social history are unchanged. All other review of systems are negative. Patient denies any changes in medications. Medication list was reconciled.\par \par On examination, the patient is in no acute distress. He is alert and oriented follows commands. He has normal mood and affect. He is normocephalic. Neck is supple. Oral no thrush Respirations are unlabored. His abdomen is soft and nontender. Bladder is nonpalpable. No CVA tenderness. Neurologically he is grossly intact. No peripheral edema. Skin without gross abnormality. He has normal male external genitalia. Normal meatus. Bilateral testes are descended intrascrotally and normal to palpation. On rectal examination, there is normal sphincter tone. The prostate is clinically benign without focal induration or nodularity.\par \par Prostate biopsy demonstrated evidence of Stephane 6 adenocarcinoma of the prostate involving 1 core in 15 biopsies..\par \par Post-void residual on bladder scan today was 100 cc. \par \par Assessment: Prostate cancer. \par \par I counseled the patient on its clinical significance. I discussed the risk of metastatic disease and the probability of organ confined disease. I discussed the treatment options available to him including expectant management, hormonal therapy, radiation, and surgery. The risks and benefits of each options were discussed in detail as well quality of life issues. I answered his questions. He wishes to consider his options. Risks and benefits were discussed. Alternatives were given. I answered the patient questions. The patient wishes to consider the procedure and discuss with his family. His PVR today was 100. I recommended that he begin a trial of Proscar. I discussed the potential side effects of the medication. I counseled the patient on its use and side effects. If the patient develops any side effects, the patient will discontinue the medication and contact me.   In terms of his previous hyponatremia,I encouraged him to obtain BMP today to ensure stability. The patient will follow-up as directed and will contact me with any questions or concerns or he has made a decision regarding the treatment of his prostate cancer.\par \par Plan: Consider options. Trial of Proscar. BMP. Follow-up in 3 months.

## 2023-02-27 LAB
MAGNESIUM SERPL-MCNC: 1.7 MG/DL
SODIUM SERPL-SCNC: 136 MMOL/L

## 2023-03-02 ENCOUNTER — APPOINTMENT (OUTPATIENT)
Dept: INTERNAL MEDICINE | Facility: CLINIC | Age: 73
End: 2023-03-02
Payer: MEDICARE

## 2023-03-02 VITALS
DIASTOLIC BLOOD PRESSURE: 74 MMHG | SYSTOLIC BLOOD PRESSURE: 139 MMHG | HEIGHT: 65 IN | OXYGEN SATURATION: 98 % | WEIGHT: 149 LBS | TEMPERATURE: 98.2 F | HEART RATE: 97 BPM | BODY MASS INDEX: 24.83 KG/M2

## 2023-03-02 DIAGNOSIS — M94.0 CHONDROCOSTAL JUNCTION SYNDROME [TIETZE]: ICD-10-CM

## 2023-03-02 PROCEDURE — 99213 OFFICE O/P EST LOW 20 MIN: CPT

## 2023-03-02 RX ORDER — MUPIROCIN 2 G/100G
2 CREAM TOPICAL TWICE DAILY
Qty: 1 | Refills: 0 | Status: COMPLETED | COMMUNITY
Start: 2023-03-02 | End: 2023-03-09

## 2023-03-02 RX ORDER — AMOXICILLIN AND CLAVULANATE POTASSIUM 875; 125 MG/1; MG/1
875-125 TABLET, COATED ORAL TWICE DAILY
Qty: 10 | Refills: 0 | Status: COMPLETED | COMMUNITY
Start: 2023-02-19 | End: 2023-03-02

## 2023-03-02 RX ORDER — DICLOFENAC SODIUM 1% 10 MG/G
1 GEL TOPICAL
Qty: 1 | Refills: 2 | Status: ACTIVE | COMMUNITY
Start: 2023-03-02 | End: 1900-01-01

## 2023-03-03 NOTE — HISTORY OF PRESENT ILLNESS
[FreeTextEntry8] : Presents with reproducible left sided chest wall pain for 2 days. Not related to exertion, no associated SOB, palpitations or radiation of sharp pain down the shoulder/arm or face. No fevers or chills. Pain is reproducible when he presses down with his hand over the rib cage. No trauma or falls.

## 2023-03-03 NOTE — PHYSICAL EXAM
[Normal] : soft, non-tender, non-distended, no masses palpated, no HSM and normal bowel sounds [de-identified] : L sided chest wall pain reproducible with palpation in between the ribs

## 2023-03-03 NOTE — ASSESSMENT
[FreeTextEntry1] : \par Suspect costochondritis given history and exam findings. Advised Voltaren gel topically for relief. May also apply heating pads for relief.

## 2023-03-06 ENCOUNTER — NON-APPOINTMENT (OUTPATIENT)
Age: 73
End: 2023-03-06

## 2023-03-06 PROBLEM — R30.0 DYSURIA: Status: ACTIVE | Noted: 2023-03-06

## 2023-03-11 DIAGNOSIS — K12.0 RECURRENT ORAL APHTHAE: ICD-10-CM

## 2023-03-11 LAB
APPEARANCE: ABNORMAL
BACTERIA UR CULT: ABNORMAL
BACTERIA: NEGATIVE
BILIRUBIN URINE: NEGATIVE
BLOOD URINE: ABNORMAL
COLOR: YELLOW
GLUCOSE QUALITATIVE U: ABNORMAL
HYALINE CASTS: 1 /LPF
KETONES URINE: NEGATIVE
LEUKOCYTE ESTERASE URINE: ABNORMAL
MICROSCOPIC-UA: NORMAL
NITRITE URINE: POSITIVE
PH URINE: 6.5
PROTEIN URINE: ABNORMAL
RED BLOOD CELLS URINE: 2 /HPF
SPECIFIC GRAVITY URINE: 1.01
SQUAMOUS EPITHELIAL CELLS: 0 /HPF
UROBILINOGEN URINE: NORMAL
WHITE BLOOD CELLS URINE: >720 /HPF

## 2023-03-20 ENCOUNTER — RX RENEWAL (OUTPATIENT)
Age: 73
End: 2023-03-20

## 2023-03-30 ENCOUNTER — APPOINTMENT (OUTPATIENT)
Dept: UROLOGY | Facility: CLINIC | Age: 73
End: 2023-03-30
Payer: MEDICARE

## 2023-03-30 DIAGNOSIS — N13.8 BENIGN PROSTATIC HYPERPLASIA WITH LOWER URINARY TRACT SYMPMS: ICD-10-CM

## 2023-03-30 DIAGNOSIS — N40.1 BENIGN PROSTATIC HYPERPLASIA WITH LOWER URINARY TRACT SYMPMS: ICD-10-CM

## 2023-03-30 PROCEDURE — 99214 OFFICE O/P EST MOD 30 MIN: CPT

## 2023-03-30 RX ORDER — CLOTRIMAZOLE AND BETAMETHASONE DIPROPIONATE 10; .5 MG/G; MG/G
1-0.05 CREAM TOPICAL TWICE DAILY
Qty: 1 | Refills: 0 | Status: ACTIVE | COMMUNITY
Start: 2023-03-30 | End: 1900-01-01

## 2023-04-01 LAB
APPEARANCE: CLEAR
BACTERIA UR CULT: NORMAL
BACTERIA: NEGATIVE
BILIRUBIN URINE: NEGATIVE
BLOOD URINE: NEGATIVE
COLOR: NORMAL
GLUCOSE QUALITATIVE U: ABNORMAL
HYALINE CASTS: 0 /LPF
KETONES URINE: NEGATIVE
LEUKOCYTE ESTERASE URINE: ABNORMAL
MICROSCOPIC-UA: NORMAL
NITRITE URINE: NEGATIVE
PH URINE: 6.5
PROTEIN URINE: NEGATIVE
RED BLOOD CELLS URINE: 1 /HPF
SPECIFIC GRAVITY URINE: 1.01
SQUAMOUS EPITHELIAL CELLS: 0 /HPF
UROBILINOGEN URINE: NORMAL
WHITE BLOOD CELLS URINE: 2 /HPF

## 2023-04-01 NOTE — LETTER BODY
[FreeTextEntry1] : Markell Soto MD\par [address]\[place] [address]\par [phone]\par \par Dear Dr. Soto,\par \par Reason for visit: Elevated PSA. Prostate cancer. \par \par This is a 72 year old male with elevated PSA, prostate cancer, BPH, and UTI. Prostate biopsy demonstrated evidence of Stephane 6 adenocarcinoma of the prostate involving 1 core in 15 biopsies. He was hospitalized after the procedure for hyponitremia. The etiology of his hyponatremia was unclear.  It may have been related to his fluid intake and emesis. He is here today for a follow up. Since he was last seen, his UTI was treated with antibiotics. He has balanitis. He has been taking Proscar regularly with no side effects or difficulties on the medication. He denies any fevers or chills. He denies any pain. Patient denies any changes in health. The past medical history and family history and social history are unchanged. All other review of systems are negative. Patient denies any changes in medications. Medication list was reconciled.\par \par On examination, the patient is in no acute distress. He is alert and oriented follows commands. He has normal mood and affect. He is normocephalic. Neck is supple. Oral no thrush Respirations are unlabored. His abdomen is soft and nontender. Bladder is nonpalpable. No CVA tenderness. Neurologically he is grossly intact. No peripheral edema. Skin without gross abnormality. He has normal male external genitalia. Normal meatus. Bilateral testes are descended intrascrotally and normal to palpation. On rectal examination, there is normal sphincter tone. The prostate is clinically benign without focal induration or nodularity. There is evidence of balanitis. \par \par Post-void residual on bladder scan today was 127 cc. \par \par Assessment: Prostate cancer. BPH. UTI. Balanitis. \par \par I counseled the patient. In terms of his UTI, it was treated with antibiotics. In terms of his BPH, his symptoms are stable on Proscar. I recommended he continue the medication. In terms of his balanitis, I recommended he begin using Clotrimazole Betamethasone ointment. I discussed the potential side effects of the medication. I counseled the patient on its use and side effects. If the patient develops any side effects, the patient will discontinue the medication and contact me. In terms of his prostate cancer, he will follow up as directed. The patient will follow-up as directed and will contact me with any questions or concerns or he has made a decision regarding the treatment of his prostate cancer.\par \par Plan: Continue Proscar. Trial of Clotrimazole Betamethasone. ointment. Follow up in May.

## 2023-05-18 ENCOUNTER — APPOINTMENT (OUTPATIENT)
Dept: UROLOGY | Facility: CLINIC | Age: 73
End: 2023-05-18
Payer: MEDICARE

## 2023-05-18 VITALS
SYSTOLIC BLOOD PRESSURE: 186 MMHG | HEART RATE: 70 BPM | DIASTOLIC BLOOD PRESSURE: 91 MMHG | WEIGHT: 148 LBS | HEIGHT: 65 IN | BODY MASS INDEX: 24.66 KG/M2 | RESPIRATION RATE: 16 BRPM

## 2023-05-18 DIAGNOSIS — H90.5 UNSPECIFIED SENSORINEURAL HEARING LOSS: ICD-10-CM

## 2023-05-18 DIAGNOSIS — Z00.00 ENCOUNTER FOR GENERAL ADULT MEDICAL EXAMINATION W/OUT ABNORMAL FINDINGS: ICD-10-CM

## 2023-05-18 DIAGNOSIS — Z71.89 OTHER SPECIFIED COUNSELING: ICD-10-CM

## 2023-05-18 PROCEDURE — 99214 OFFICE O/P EST MOD 30 MIN: CPT

## 2023-05-20 LAB
ANION GAP SERPL CALC-SCNC: 13 MMOL/L
BUN SERPL-MCNC: 17 MG/DL
CALCIUM SERPL-MCNC: 9.5 MG/DL
CHLORIDE SERPL-SCNC: 101 MMOL/L
CO2 SERPL-SCNC: 24 MMOL/L
CREAT SERPL-MCNC: 1.04 MG/DL
EGFR: 75 ML/MIN/1.73M2
GLUCOSE SERPL-MCNC: 210 MG/DL
POTASSIUM SERPL-SCNC: 4.5 MMOL/L
PSA FREE FLD-MCNC: 10 %
PSA FREE SERPL-MCNC: 0.73 NG/ML
PSA SERPL-MCNC: 7.03 NG/ML
SODIUM SERPL-SCNC: 139 MMOL/L

## 2023-05-21 NOTE — LETTER BODY
[FreeTextEntry1] : Markell Soto MD\par [address]\[place] [address]\par [phone]\par \par Dear Dr. Soto,\par \par Reason for visit: Elevated PSA. Prostate cancer. \par \par This is a 72 year old male with elevated PSA, prostate cancer, BPH, and UTI. Prostate biopsy demonstrated evidence of Stephane 6 adenocarcinoma of the prostate involving 1 core in 15 biopsies. He was hospitalized after the procedure for hyponitremia. The etiology of his hyponatremia was unclear.  It may have been related to his fluid intake and emesis. His previous UTI was treated with antibiotics. He had balanitis. He is here today for a follow up. Since he was last seen, he has been taking Proscar and Flomax BID regularly with no side effects or difficulties on the medication. He reports improved symptoms on medical therapy. He denies any fevers or chills. He denies any pain. Patient denies any changes in health. The past medical history and family history and social history are unchanged. All other review of systems are negative. Patient denies any changes in medications. Medication list was reconciled.\par \par On examination, the patient is in no acute distress. He is alert and oriented follows commands. He has normal mood and affect. He is normocephalic. Neck is supple. Oral no thrush Respirations are unlabored. His abdomen is soft and nontender. Bladder is nonpalpable. No CVA tenderness. Neurologically he is grossly intact. No peripheral edema. Skin without gross abnormality. He has normal male external genitalia. Normal meatus. Bilateral testes are descended intrascrotally and normal to palpation. On rectal examination, there is normal sphincter tone. The prostate is clinically benign without focal induration or nodularity. There is evidence of balanitis. \par \par Assessment: Prostate cancer on active surveillance. BPH. Balanitis. Hyponitremia. \par \par I counseled the patient. In terms of his BPH, his symptoms are stable on Proscar and Flomax BID. I recommended he continue the medications. In terms of his prostate cancer, he will continue with active surveillance. He will obtain a PSA today to ensure stability. In terms of his previous hyponitremia, he will obtain a BMP to ensure stability. Risks and alternatives were discussed. I answered the patient questions. The patient will follow-up as directed and will contact me with any questions or concerns.\par \par Plan: Continue Proscar and Flomax BID. PSA. BMP.

## 2023-05-26 ENCOUNTER — NON-APPOINTMENT (OUTPATIENT)
Age: 73
End: 2023-05-26

## 2023-06-30 ENCOUNTER — RX RENEWAL (OUTPATIENT)
Age: 73
End: 2023-06-30

## 2023-07-03 ENCOUNTER — APPOINTMENT (OUTPATIENT)
Dept: INTERNAL MEDICINE | Facility: CLINIC | Age: 73
End: 2023-07-03
Payer: MEDICARE

## 2023-07-03 VITALS
BODY MASS INDEX: 24.99 KG/M2 | HEART RATE: 80 BPM | SYSTOLIC BLOOD PRESSURE: 140 MMHG | OXYGEN SATURATION: 98 % | DIASTOLIC BLOOD PRESSURE: 70 MMHG | WEIGHT: 150 LBS | HEIGHT: 65 IN

## 2023-07-03 DIAGNOSIS — Z91.81 HISTORY OF FALLING: ICD-10-CM

## 2023-07-03 LAB — HBA1C MFR BLD HPLC: 7.3

## 2023-07-03 PROCEDURE — 99214 OFFICE O/P EST MOD 30 MIN: CPT | Mod: 25

## 2023-07-03 PROCEDURE — 83036 HEMOGLOBIN GLYCOSYLATED A1C: CPT | Mod: QW

## 2023-07-03 RX ORDER — PREDNISONE 50 MG/1
50 TABLET ORAL
Qty: 3 | Refills: 0 | Status: COMPLETED | COMMUNITY
Start: 2022-12-27 | End: 2022-12-31

## 2023-07-03 RX ORDER — CEFUROXIME AXETIL 500 MG/1
500 TABLET ORAL
Qty: 14 | Refills: 0 | Status: COMPLETED | COMMUNITY
Start: 2023-03-11 | End: 2023-03-18

## 2023-07-03 RX ORDER — UBIDECARENONE 200 MG
200 CAPSULE ORAL
Qty: 30 | Refills: 11 | Status: ACTIVE | COMMUNITY
Start: 2023-07-03

## 2023-07-05 LAB
ALBUMIN SERPL ELPH-MCNC: 4.5 G/DL
ALP BLD-CCNC: 61 U/L
ALT SERPL-CCNC: 20 U/L
ANION GAP SERPL CALC-SCNC: 12 MMOL/L
AST SERPL-CCNC: 31 U/L
BILIRUB SERPL-MCNC: 0.2 MG/DL
BUN SERPL-MCNC: 22 MG/DL
CALCIUM SERPL-MCNC: 9.5 MG/DL
CHLORIDE SERPL-SCNC: 104 MMOL/L
CHOLEST SERPL-MCNC: 159 MG/DL
CO2 SERPL-SCNC: 23 MMOL/L
CREAT SERPL-MCNC: 0.97 MG/DL
CREAT SPEC-SCNC: 67 MG/DL
CREAT SPEC-SCNC: 67 MG/DL
CREAT/PROT UR: 0.1 RATIO
EGFR: 82 ML/MIN/1.73M2
ESTIMATED AVERAGE GLUCOSE: 169 MG/DL
GLUCOSE SERPL-MCNC: 113 MG/DL
HBA1C MFR BLD HPLC: 7.5 %
HDLC SERPL-MCNC: 50 MG/DL
LDLC SERPL CALC-MCNC: 62 MG/DL
MICROALBUMIN 24H UR DL<=1MG/L-MCNC: <1.2 MG/DL
MICROALBUMIN/CREAT 24H UR-RTO: NORMAL MG/G
NONHDLC SERPL-MCNC: 110 MG/DL
POTASSIUM SERPL-SCNC: 5 MMOL/L
PROT SERPL-MCNC: 7.4 G/DL
PROT UR-MCNC: 8 MG/DL
SODIUM SERPL-SCNC: 138 MMOL/L
TRIGL SERPL-MCNC: 240 MG/DL

## 2023-07-05 NOTE — HEALTH RISK ASSESSMENT
[Name: ___] : Health Care Proxy's Name: [unfilled]  [Relationship: ___] : Relationship: [unfilled] [AdvancecareDate] : 07/23

## 2023-07-05 NOTE — HISTORY OF PRESENT ILLNESS
[de-identified] : 71 yo hosp post prostate bx\par CVA  plavix\par spinal stenosis, LBP PT  R sided\par DM  on oral hypoglycemics  glimeperide metformin\par EYE Oct 2022\par \par Discussion NO HYPOGLYCEMIA\par Lately 127   under    days ago  150  only once  When have RICE for dinner so stopped rice\par NEVER below 100\par No longer TINGLING\par \par CVA 2014 March  L mouth, arm, leg   Speech affected  rehab\par CANE BECAUSE STROKE Neurology [facility]\par \par

## 2023-07-11 ENCOUNTER — APPOINTMENT (OUTPATIENT)
Dept: INTERNAL MEDICINE | Facility: CLINIC | Age: 73
End: 2023-07-11

## 2023-07-18 ENCOUNTER — APPOINTMENT (OUTPATIENT)
Dept: INTERNAL MEDICINE | Facility: CLINIC | Age: 73
End: 2023-07-18

## 2023-07-21 DIAGNOSIS — R69 ILLNESS, UNSPECIFIED: ICD-10-CM

## 2023-07-21 RX ORDER — GLIMEPIRIDE 1 MG/1
1 TABLET ORAL
Qty: 180 | Refills: 3 | Status: DISCONTINUED | COMMUNITY
Start: 2018-05-30 | End: 2023-07-21

## 2023-07-24 ENCOUNTER — NON-APPOINTMENT (OUTPATIENT)
Age: 73
End: 2023-07-24

## 2023-07-30 NOTE — ED ADULT NURSE NOTE - BREATHING, MLM
Subjective:      Patient ID: Antwan Mahmood is a 1 y.o. male. Other    Franki Gonzalez presents to clinic with concern for foot abnormality. Mom reports that Franki Gonzalez has had increasingly noticeable turning out of his left foot. Review of Systems   All other systems reviewed and are negative. Objective:   Physical Exam  Constitutional:       General: He is active. He is not in acute distress. Appearance: Normal appearance. He is normal weight. He is not ill-appearing. HENT:      Head: Normocephalic. No cranial deformity. Nose: Nose normal.      Mouth/Throat:      Mouth: Mucous membranes are moist.   Eyes:      General: Lids are normal.         Right eye: No discharge. Left eye: No discharge. Conjunctiva/sclera: Conjunctivae normal.   Pulmonary:      Effort: Pulmonary effort is normal. No respiratory distress. Musculoskeletal:      Cervical back: Normal range of motion. Comments: Bilateral flat feet with weight bearing on inside of left foot   Skin:     Findings: No rash. Neurological:      Mental Status: He is alert and oriented for age. Assessment:       Diagnosis Orders   1. Pes planus of both feet              Plan:      Recommend orthotics +/- PT to help with foot strength. Discussed importance of foot support when wearing day to day shoes as well. Will refer to orthotics to consider inserts. Return to clinic if failure to improve, emergence of new symptoms, or further concerns.           Carmen Banks, 
Tachypnea

## 2023-08-28 ENCOUNTER — RX RENEWAL (OUTPATIENT)
Age: 73
End: 2023-08-28

## 2023-09-29 ENCOUNTER — RESULT CHARGE (OUTPATIENT)
Age: 73
End: 2023-09-29

## 2023-09-29 ENCOUNTER — APPOINTMENT (OUTPATIENT)
Dept: INTERNAL MEDICINE | Facility: CLINIC | Age: 73
End: 2023-09-29
Payer: MEDICARE

## 2023-09-29 ENCOUNTER — LABORATORY RESULT (OUTPATIENT)
Age: 73
End: 2023-09-29

## 2023-09-29 ENCOUNTER — NON-APPOINTMENT (OUTPATIENT)
Age: 73
End: 2023-09-29

## 2023-09-29 ENCOUNTER — OUTPATIENT (OUTPATIENT)
Dept: OUTPATIENT SERVICES | Facility: HOSPITAL | Age: 73
LOS: 1 days | End: 2023-09-29
Payer: MEDICARE

## 2023-09-29 VITALS
OXYGEN SATURATION: 98 % | HEIGHT: 65 IN | SYSTOLIC BLOOD PRESSURE: 170 MMHG | HEART RATE: 80 BPM | BODY MASS INDEX: 25.86 KG/M2 | WEIGHT: 155.2 LBS | DIASTOLIC BLOOD PRESSURE: 90 MMHG

## 2023-09-29 VITALS — SYSTOLIC BLOOD PRESSURE: 137 MMHG | DIASTOLIC BLOOD PRESSURE: 70 MMHG

## 2023-09-29 DIAGNOSIS — I10 ESSENTIAL (PRIMARY) HYPERTENSION: ICD-10-CM

## 2023-09-29 DIAGNOSIS — Z23 ENCOUNTER FOR IMMUNIZATION: ICD-10-CM

## 2023-09-29 DIAGNOSIS — M48.061 SPINAL STENOSIS, LUMBAR REGION WITHOUT NEUROGENIC CLAUDICATION: ICD-10-CM

## 2023-09-29 DIAGNOSIS — E61.2 MAGNESIUM DEFICIENCY: ICD-10-CM

## 2023-09-29 DIAGNOSIS — E04.1 NONTOXIC SINGLE THYROID NODULE: ICD-10-CM

## 2023-09-29 PROCEDURE — G0439: CPT

## 2023-09-29 PROCEDURE — G0008: CPT

## 2023-09-29 PROCEDURE — 90662 IIV NO PRSV INCREASED AG IM: CPT

## 2023-09-29 RX ORDER — SODIUM PHOSPHATE, DIBASIC AND SODIUM PHOSPHATE, MONOBASIC 7; 19 G/230ML; G/230ML
ENEMA RECTAL
Qty: 1 | Refills: 0 | Status: COMPLETED | COMMUNITY
Start: 2023-01-11 | End: 2023-09-29

## 2023-09-29 RX ORDER — SODIUM PHOSPHATE, DIBASIC AND SODIUM PHOSPHATE, MONOBASIC 7; 19 G/230ML; G/230ML
ENEMA RECTAL
Qty: 1 | Refills: 0 | Status: COMPLETED | COMMUNITY
Start: 2022-12-11 | End: 2023-09-29

## 2023-09-29 RX ORDER — CAMPHOR 0.45 %
25 GEL (GRAM) TOPICAL
Qty: 1 | Refills: 0 | Status: COMPLETED | COMMUNITY
Start: 2022-12-27 | End: 2023-09-29

## 2023-09-29 RX ORDER — SODIUM PHOSPHATE, DIBASIC AND SODIUM PHOSPHATE, MONOBASIC 7; 19 G/230ML; G/230ML
ENEMA RECTAL
Qty: 1 | Refills: 0 | Status: COMPLETED | COMMUNITY
Start: 2023-01-09 | End: 2023-09-29

## 2023-09-30 LAB
25(OH)D3 SERPL-MCNC: 28 NG/ML
ALBUMIN SERPL ELPH-MCNC: 4.8 G/DL
ALP BLD-CCNC: 55 U/L
ALT SERPL-CCNC: 25 U/L
ANION GAP SERPL CALC-SCNC: 14 MMOL/L
AST SERPL-CCNC: 24 U/L
BASOPHILS # BLD AUTO: 0.11 K/UL
BASOPHILS NFR BLD AUTO: 0.9 %
BILIRUB SERPL-MCNC: 0.2 MG/DL
BUN SERPL-MCNC: 17 MG/DL
CALCIUM SERPL-MCNC: 9.6 MG/DL
CHLORIDE SERPL-SCNC: 101 MMOL/L
CHOLEST SERPL-MCNC: 190 MG/DL
CO2 SERPL-SCNC: 25 MMOL/L
CREAT SERPL-MCNC: 1.08 MG/DL
EGFR: 72 ML/MIN/1.73M2
EOSINOPHIL # BLD AUTO: 0.84 K/UL
EOSINOPHIL NFR BLD AUTO: 7 %
ESTIMATED AVERAGE GLUCOSE: 166 MG/DL
GLUCOSE SERPL-MCNC: 67 MG/DL
HBA1C MFR BLD HPLC: 7.4 %
HCT VFR BLD CALC: 42.7 %
HDLC SERPL-MCNC: 59 MG/DL
HGB BLD-MCNC: 13.8 G/DL
LDLC SERPL CALC-MCNC: 99 MG/DL
LYMPHOCYTES # BLD AUTO: 4.32 K/UL
LYMPHOCYTES NFR BLD AUTO: 36 %
MAGNESIUM SERPL-MCNC: 1.8 MG/DL
MAN DIFF?: NORMAL
MCHC RBC-ENTMCNC: 28.4 PG
MCHC RBC-ENTMCNC: 32.3 GM/DL
MCV RBC AUTO: 87.9 FL
MONOCYTES # BLD AUTO: 0.42 K/UL
MONOCYTES NFR BLD AUTO: 3.5 %
NEUTROPHILS # BLD AUTO: 6.11 K/UL
NEUTROPHILS NFR BLD AUTO: 50.9 %
NONHDLC SERPL-MCNC: 132 MG/DL
PLATELET # BLD AUTO: 250 K/UL
POTASSIUM SERPL-SCNC: 4.1 MMOL/L
PROT SERPL-MCNC: 7.6 G/DL
RBC # BLD: 4.86 M/UL
RBC # FLD: 13.2 %
SODIUM SERPL-SCNC: 141 MMOL/L
TRIGL SERPL-MCNC: 190 MG/DL
VIT B12 SERPL-MCNC: 557 PG/ML
WBC # FLD AUTO: 12 K/UL

## 2023-10-03 DIAGNOSIS — C61 MALIGNANT NEOPLASM OF PROSTATE: ICD-10-CM

## 2023-10-03 DIAGNOSIS — E61.2 MAGNESIUM DEFICIENCY: ICD-10-CM

## 2023-10-03 DIAGNOSIS — Z00.00 ENCOUNTER FOR GENERAL ADULT MEDICAL EXAMINATION WITHOUT ABNORMAL FINDINGS: ICD-10-CM

## 2023-10-03 DIAGNOSIS — E55.9 VITAMIN D DEFICIENCY, UNSPECIFIED: ICD-10-CM

## 2023-10-03 DIAGNOSIS — M48.061 SPINAL STENOSIS, LUMBAR REGION WITHOUT NEUROGENIC CLAUDICATION: ICD-10-CM

## 2023-10-03 DIAGNOSIS — E11.9 TYPE 2 DIABETES MELLITUS WITHOUT COMPLICATIONS: ICD-10-CM

## 2023-10-03 DIAGNOSIS — I63.9 CEREBRAL INFARCTION, UNSPECIFIED: ICD-10-CM

## 2023-10-03 DIAGNOSIS — Z23 ENCOUNTER FOR IMMUNIZATION: ICD-10-CM

## 2023-10-12 ENCOUNTER — APPOINTMENT (OUTPATIENT)
Dept: OTOLARYNGOLOGY | Facility: CLINIC | Age: 73
End: 2023-10-12
Payer: MEDICARE

## 2023-10-12 VITALS
BODY MASS INDEX: 24.99 KG/M2 | TEMPERATURE: 98 F | HEART RATE: 83 BPM | DIASTOLIC BLOOD PRESSURE: 76 MMHG | SYSTOLIC BLOOD PRESSURE: 138 MMHG | HEIGHT: 65 IN | WEIGHT: 150 LBS

## 2023-10-12 DIAGNOSIS — H61.23 IMPACTED CERUMEN, BILATERAL: ICD-10-CM

## 2023-10-12 DIAGNOSIS — L29.9 PRURITUS, UNSPECIFIED: ICD-10-CM

## 2023-10-12 PROCEDURE — 69210 REMOVE IMPACTED EAR WAX UNI: CPT

## 2023-10-12 PROCEDURE — 99214 OFFICE O/P EST MOD 30 MIN: CPT | Mod: 25

## 2023-10-12 RX ORDER — MOMETASONE FUROATE 1 MG/G
0.1 CREAM TOPICAL
Qty: 1 | Refills: 0 | Status: ACTIVE | COMMUNITY
Start: 2023-10-12 | End: 1900-01-01

## 2023-10-29 NOTE — PHYSICAL EXAM
[Antalgic] : antalgic [Stooped] : stooped [de-identified] : Examination of the lumbar spine reveals no midline tenderness palpation, step-offs, or skin lesions. Decreased range of motion with respect to flexion, extension, lateral bending, and rotation. No tenderness to palpation of the sciatic notch. No tenderness palpation of the bilateral greater trochanters. No pain with passive internal/external rotation of the hips. No instability of bilateral lower extremities.  Negative GAL. Negative straight leg raise bilaterally. No bowstring. Negative femoral stretch. 5 out of 5 iliopsoas, hip abductors, hips adductors, quadriceps, hamstrings, gastrocsoleus, tibialis anterior, extensor hallucis longus, peroneals. Grossly intact sensation to light touch bilateral lower extremities. 1+ patellar and Achilles reflexes. Downgoing Babinski. No clonus. Intact proprioception. Palpable pulses. No skin lesion and no edema on the right and left lower extremities. [de-identified] : AP lateral lumbar x-rays with multilevel spondylosis without gross instability or aggressive lesions\par \par Updated lumbar spine MRI reveals significant stenosis from L3-5.  He also has severe foraminal stenosis L5-S1. 29-Oct-2023 10:44

## 2023-11-13 ENCOUNTER — APPOINTMENT (OUTPATIENT)
Dept: NEUROLOGY | Facility: CLINIC | Age: 73
End: 2023-11-13
Payer: MEDICARE

## 2023-11-13 VITALS
BODY MASS INDEX: 24.99 KG/M2 | SYSTOLIC BLOOD PRESSURE: 150 MMHG | WEIGHT: 150 LBS | DIASTOLIC BLOOD PRESSURE: 74 MMHG | HEIGHT: 65 IN | HEART RATE: 83 BPM

## 2023-11-13 PROCEDURE — 99205 OFFICE O/P NEW HI 60 MIN: CPT

## 2023-12-04 ENCOUNTER — APPOINTMENT (OUTPATIENT)
Dept: UROLOGY | Facility: CLINIC | Age: 73
End: 2023-12-04
Payer: MEDICARE

## 2023-12-04 DIAGNOSIS — C61 MALIGNANT NEOPLASM OF PROSTATE: ICD-10-CM

## 2023-12-04 PROCEDURE — 99214 OFFICE O/P EST MOD 30 MIN: CPT

## 2023-12-06 RX ORDER — ATORVASTATIN CALCIUM 40 MG/1
40 TABLET, FILM COATED ORAL DAILY
Qty: 90 | Refills: 3 | Status: ACTIVE | COMMUNITY
Start: 2017-09-14 | End: 1900-01-01

## 2023-12-09 LAB
ANION GAP SERPL CALC-SCNC: 12 MMOL/L
APPEARANCE: CLEAR
BACTERIA: NEGATIVE /HPF
BILIRUBIN URINE: NEGATIVE
BLOOD URINE: NEGATIVE
BUN SERPL-MCNC: 25 MG/DL
CALCIUM SERPL-MCNC: 9.6 MG/DL
CAST: 0 /LPF
CHLORIDE SERPL-SCNC: 101 MMOL/L
CO2 SERPL-SCNC: 25 MMOL/L
COLOR: YELLOW
CREAT SERPL-MCNC: 1.08 MG/DL
EGFR: 72 ML/MIN/1.73M2
EPITHELIAL CELLS: 0 /HPF
GLUCOSE QUALITATIVE U: >=1000 MG/DL
GLUCOSE SERPL-MCNC: 267 MG/DL
KETONES URINE: NEGATIVE MG/DL
LEUKOCYTE ESTERASE URINE: NEGATIVE
MICROSCOPIC-UA: NORMAL
NITRITE URINE: NEGATIVE
PH URINE: 6.5
POTASSIUM SERPL-SCNC: 5 MMOL/L
PROTEIN URINE: NEGATIVE MG/DL
PSA FREE FLD-MCNC: 12 %
PSA FREE SERPL-MCNC: 1.11 NG/ML
PSA SERPL-MCNC: 9.16 NG/ML
RED BLOOD CELLS URINE: 0 /HPF
SODIUM SERPL-SCNC: 137 MMOL/L
SPECIFIC GRAVITY URINE: 1.02
UROBILINOGEN URINE: 0.2 MG/DL
WHITE BLOOD CELLS URINE: 0 /HPF

## 2023-12-12 ENCOUNTER — NON-APPOINTMENT (OUTPATIENT)
Age: 73
End: 2023-12-12

## 2023-12-15 ENCOUNTER — APPOINTMENT (OUTPATIENT)
Dept: INTERNAL MEDICINE | Facility: CLINIC | Age: 73
End: 2023-12-15

## 2024-02-05 ENCOUNTER — RESULT CHARGE (OUTPATIENT)
Age: 74
End: 2024-02-05

## 2024-02-05 ENCOUNTER — APPOINTMENT (OUTPATIENT)
Dept: INTERNAL MEDICINE | Facility: CLINIC | Age: 74
End: 2024-02-05
Payer: MEDICARE

## 2024-02-05 ENCOUNTER — OUTPATIENT (OUTPATIENT)
Dept: OUTPATIENT SERVICES | Facility: HOSPITAL | Age: 74
LOS: 1 days | End: 2024-02-05
Payer: MEDICARE

## 2024-02-05 VITALS
HEART RATE: 74 BPM | HEIGHT: 65 IN | SYSTOLIC BLOOD PRESSURE: 142 MMHG | BODY MASS INDEX: 23.82 KG/M2 | OXYGEN SATURATION: 98 % | DIASTOLIC BLOOD PRESSURE: 80 MMHG | WEIGHT: 143 LBS

## 2024-02-05 VITALS — DIASTOLIC BLOOD PRESSURE: 58 MMHG | SYSTOLIC BLOOD PRESSURE: 132 MMHG

## 2024-02-05 DIAGNOSIS — I10 ESSENTIAL (PRIMARY) HYPERTENSION: ICD-10-CM

## 2024-02-05 PROCEDURE — 83036 HEMOGLOBIN GLYCOSYLATED A1C: CPT

## 2024-02-05 PROCEDURE — G0463: CPT

## 2024-02-05 PROCEDURE — 99214 OFFICE O/P EST MOD 30 MIN: CPT

## 2024-02-05 RX ORDER — EMPAGLIFLOZIN 25 MG/1
25 TABLET, FILM COATED ORAL DAILY
Qty: 90 | Refills: 3 | Status: ACTIVE | COMMUNITY
Start: 2024-02-05 | End: 1900-01-01

## 2024-02-05 RX ORDER — EMPAGLIFLOZIN 10 MG/1
10 TABLET, FILM COATED ORAL
Qty: 30 | Refills: 11 | Status: DISCONTINUED | COMMUNITY
Start: 2023-07-21 | End: 2024-02-05

## 2024-02-05 NOTE — PHYSICAL EXAM
[No Respiratory Distress] : no respiratory distress  [No Accessory Muscle Use] : no accessory muscle use [Clear to Auscultation] : lungs were clear to auscultation bilaterally [Normal Rate] : normal rate  [Regular Rhythm] : with a regular rhythm [Normal S1, S2] : normal S1 and S2 [No Edema] : there was no peripheral edema [Right Foot Was Examined] : Right foot ~C was examined [Left Foot Was Examined] : left foot ~C was examined [] : both feet [No Acute Distress] : no acute distress [Well-Appearing] : well-appearing [de-identified] : hypersensitive BL feet     [TWNoteComboBox4] : +2

## 2024-02-05 NOTE — HISTORY OF PRESENT ILLNESS
[FreeTextEntry1] : Management HTN [de-identified] : 72 yo prostate CA watchful  Tiona 6 resolved hyponatremia CVA 2014  L sided weakness Cane use spinal stenosis L3-S1 August 2022 MRI confirmed  most severe L4L5 R side   Got accupuncture and PT No red flag sxs nor signs  DM was taking glimeperide and no hJardiance  Plan was to stop glimeperide and start Jardiance  EYE FOOT SHINGRIX colon 2016, PCV 20 need Tdap need SHINGRIX  has not checked FS  200  normally was  neuropathy seeing neuromuscular specialist   Morning calf CRAMPING gone quickly when STANDS  AM BAGEL   or 2 pancake    PM RICE  2 scoop rice A1c 7.4--->9

## 2024-02-05 NOTE — ASSESSMENT
[FreeTextEntry1] : 1) DM appt was delayed add repaglinide w Breakfast and dinner increase Jardiance 25mg daily  2) SHINGRIX and Tdap   PHARMACY last Tdap 2013  3) HTN controlled 4) BPH   AM finasteride and tamsulosin QHS

## 2024-02-05 NOTE — HEALTH RISK ASSESSMENT
[No falls in past year] : Patient reported no falls in the past year [0] : 2) Feeling down, depressed, or hopeless: Not at all (0) [PHQ-2 Negative - No further assessment needed] : PHQ-2 Negative - No further assessment needed [[id number]] : 0

## 2024-02-05 NOTE — REVIEW OF SYSTEMS
[Fever] : no fever [Chest Pain] : no chest pain [Palpitations] : no palpitations [Shortness Of Breath] : no shortness of breath [Abdominal Pain] : no abdominal pain

## 2024-02-09 DIAGNOSIS — I63.9 CEREBRAL INFARCTION, UNSPECIFIED: ICD-10-CM

## 2024-02-15 DIAGNOSIS — E11.9 TYPE 2 DIABETES MELLITUS WITHOUT COMPLICATIONS: ICD-10-CM

## 2024-02-16 LAB
CHOLEST SERPL-MCNC: 156 MG/DL
HDLC SERPL-MCNC: 54 MG/DL
LDLC SERPL CALC-MCNC: 85 MG/DL
NONHDLC SERPL-MCNC: 102 MG/DL
TRIGL SERPL-MCNC: 92 MG/DL

## 2024-02-22 ENCOUNTER — APPOINTMENT (OUTPATIENT)
Dept: NEUROLOGY | Facility: CLINIC | Age: 74
End: 2024-02-22
Payer: MEDICARE

## 2024-02-22 VITALS
BODY MASS INDEX: 24.32 KG/M2 | DIASTOLIC BLOOD PRESSURE: 77 MMHG | WEIGHT: 146 LBS | HEART RATE: 80 BPM | HEIGHT: 65 IN | SYSTOLIC BLOOD PRESSURE: 150 MMHG

## 2024-02-22 DIAGNOSIS — M54.16 RADICULOPATHY, LUMBAR REGION: ICD-10-CM

## 2024-02-22 DIAGNOSIS — M48.07 SPINAL STENOSIS, LUMBOSACRAL REGION: ICD-10-CM

## 2024-02-22 PROCEDURE — 99214 OFFICE O/P EST MOD 30 MIN: CPT

## 2024-02-22 NOTE — PHYSICAL EXAM
[Person] : oriented to person [Fluency] : fluency intact [Time] : oriented to time [Place] : oriented to place [Comprehension] : comprehension intact [Cranial Nerves Optic (II)] : visual acuity intact bilaterally,  visual fields full to confrontation, pupils equal round and reactive to light [Past History] : adequate knowledge of personal past history [Cranial Nerves Oculomotor (III)] : extraocular motion intact [Cranial Nerves Trigeminal (V)] : facial sensation intact symmetrically [Cranial Nerves Facial (VII)] : face symmetrical [Cranial Nerves Glossopharyngeal (IX)] : tongue and palate midline [Cranial Nerves Vestibulocochlear (VIII)] : hearing was intact bilaterally [Cranial Nerves Accessory (XI - Cranial And Spinal)] : head turning and shoulder shrug symmetric [Cranial Nerves Hypoglossal (XII)] : there was no tongue deviation with protrusion [Motor Tone] : muscle tone was normal in all four extremities [Motor Strength] : muscle strength was normal in all four extremities [No Muscle Atrophy] : normal bulk in all four extremities [1+] : Ankle jerk right 1+ [2+] : Ankle jerk left 2+ [FreeTextEntry8] : Walks stooped over, uses cane, slight shuffle

## 2024-02-22 NOTE — HISTORY OF PRESENT ILLNESS
[FreeTextEntry1] : This is a 73M who comes in with paresthesias.  Sees Dr. Grant for history of stroke, but reported lower extremity numbness so was referred for further evaluation.  The patient reports tingling in both legs, from hips to feet, as well as numbness at the pads of his feet. Also, when standing up for a long time he gets low back pain radiating down both legs, alternates which side is more severe. Denies weakness, tripping, falling.  He went to PT for over 2 months, ended at the end of December 2023, but his symptoms persisted. PCP prescribed him gabapentin 100mg qhs, helps with sleep. He was tried on higher doses in the past but got dizziness. Takes Tylenol for severe pain and it helps, usually 1-2 times a week.  MRI L spine in 8/22 showed L2-3 mild b/l neuroforaminal stenosis, L3-4 with moderate left and severe right neuroforaminal stenosis, L4-5 severe bilateral neuroforaminal stenosis, L5-S1 moderate/severe bilateral neuroforaminal stenosis. Saw Dr. Zhu in 2021, was offered surgery but because glucose levels were elevated at that time his PCP advised against the surgery.

## 2024-02-22 NOTE — ASSESSMENT
[FreeTextEntry1] : This is a 73M with known multilevel lumbosacral radiculopathy who presents with persistent paresthesias and pain in his legs. Symptoms likely due to radiculopathy, but may also have some mild neuropathy as well. We discussed treatment option and the patient would rather avoid new medications due to existing polypharmacy. Rather, would like to see Ortho again to discuss surgical options.  - Referral to Ortho - EMG/NCS with me; b/l lower extremities to evaluate radiculopathy and possible neuropathy  F/u 3 months

## 2024-03-18 ENCOUNTER — OUTPATIENT (OUTPATIENT)
Dept: OUTPATIENT SERVICES | Facility: HOSPITAL | Age: 74
LOS: 1 days | End: 2024-03-18
Payer: MEDICARE

## 2024-03-18 ENCOUNTER — APPOINTMENT (OUTPATIENT)
Dept: INTERNAL MEDICINE | Facility: CLINIC | Age: 74
End: 2024-03-18
Payer: MEDICARE

## 2024-03-18 VITALS — SYSTOLIC BLOOD PRESSURE: 124 MMHG | DIASTOLIC BLOOD PRESSURE: 78 MMHG

## 2024-03-18 VITALS
DIASTOLIC BLOOD PRESSURE: 72 MMHG | HEIGHT: 65 IN | WEIGHT: 148 LBS | HEART RATE: 72 BPM | BODY MASS INDEX: 24.66 KG/M2 | OXYGEN SATURATION: 99 % | SYSTOLIC BLOOD PRESSURE: 150 MMHG

## 2024-03-18 DIAGNOSIS — Z13.39 ENCOUNTER FOR SCREENING EXAM FOR OTHER MENTAL HEALTH AND BEHAVIORAL DISORDERS: ICD-10-CM

## 2024-03-18 DIAGNOSIS — R25.2 CRAMP AND SPASM: ICD-10-CM

## 2024-03-18 DIAGNOSIS — M48.062 SPINAL STENOSIS, LUMBAR REGION WITH NEUROGENIC CLAUDICATION: ICD-10-CM

## 2024-03-18 DIAGNOSIS — E55.9 VITAMIN D DEFICIENCY, UNSPECIFIED: ICD-10-CM

## 2024-03-18 DIAGNOSIS — I10 ESSENTIAL (PRIMARY) HYPERTENSION: ICD-10-CM

## 2024-03-18 PROCEDURE — 99214 OFFICE O/P EST MOD 30 MIN: CPT

## 2024-03-18 PROCEDURE — G0463: CPT

## 2024-03-18 RX ORDER — GABAPENTIN 100 MG/1
100 CAPSULE ORAL
Qty: 30 | Refills: 1 | Status: ACTIVE | COMMUNITY
Start: 2024-02-05 | End: 1900-01-01

## 2024-03-18 NOTE — HISTORY OF PRESENT ILLNESS
[FreeTextEntry1] : Management of DM & HTN [de-identified] : 72 yo prostate CA, resolved hyponatremia CVA 2014 L weakness cane to walk spinal stenosis L3-S1    PT  accupuncture  stopped gliumeperide EYE June 2023 micr/Cr June 2023 Need FOOT exam colon 2022  We incr Jardiance from 10 to 25mg Also repaglinide w breakfast and with dinner  HbA1c 9.6%

## 2024-03-18 NOTE — PHYSICAL EXAM
[No Acute Distress] : no acute distress [Well Nourished] : well nourished [Well Developed] : well developed [Normal Sclera/Conjunctiva] : normal sclera/conjunctiva [Well-Appearing] : well-appearing [PERRL] : pupils equal round and reactive to light [EOMI] : extraocular movements intact [Normal Outer Ear/Nose] : the outer ears and nose were normal in appearance [Normal Oropharynx] : the oropharynx was normal [No JVD] : no jugular venous distention [No Lymphadenopathy] : no lymphadenopathy [Supple] : supple [Thyroid Normal, No Nodules] : the thyroid was normal and there were no nodules present [No Respiratory Distress] : no respiratory distress  [No Accessory Muscle Use] : no accessory muscle use [Clear to Auscultation] : lungs were clear to auscultation bilaterally [Normal Rate] : normal rate  [Regular Rhythm] : with a regular rhythm [Normal S1, S2] : normal S1 and S2 [No Murmur] : no murmur heard [No Abdominal Bruit] : a ~M bruit was not heard ~T in the abdomen [No Carotid Bruits] : no carotid bruits [No Varicosities] : no varicosities [Pedal Pulses Present] : the pedal pulses are present [No Edema] : there was no peripheral edema [No Extremity Clubbing/Cyanosis] : no extremity clubbing/cyanosis [No Palpable Aorta] : no palpable aorta [Non Tender] : non-tender [Soft] : abdomen soft [Non-distended] : non-distended [No Masses] : no abdominal mass palpated [No HSM] : no HSM [Normal Bowel Sounds] : normal bowel sounds [Normal Posterior Cervical Nodes] : no posterior cervical lymphadenopathy [Normal Anterior Cervical Nodes] : no anterior cervical lymphadenopathy [No CVA Tenderness] : no CVA  tenderness [No Spinal Tenderness] : no spinal tenderness [No Joint Swelling] : no joint swelling [Grossly Normal Strength/Tone] : grossly normal strength/tone [No Rash] : no rash [Coordination Grossly Intact] : coordination grossly intact [No Focal Deficits] : no focal deficits [Normal Gait] : normal gait [Deep Tendon Reflexes (DTR)] : deep tendon reflexes were 2+ and symmetric [Normal Affect] : the affect was normal [Normal Insight/Judgement] : insight and judgment were intact

## 2024-03-18 NOTE — ASSESSMENT
[FreeTextEntry1] : 1) lumbar radiculopathy   LOLA helps minimally 2) DM  Jardiance   metformin on repaglinide now WITHOUT HYPOGLYCEMIA Change 4 weeks HbA1c in 2 months Improvement of glucose levels at home 3) repeat BP   140/80 then 124/78   on only one medication no amlodipine at this time

## 2024-03-25 DIAGNOSIS — E55.9 VITAMIN D DEFICIENCY, UNSPECIFIED: ICD-10-CM

## 2024-03-25 DIAGNOSIS — M54.16 RADICULOPATHY, LUMBAR REGION: ICD-10-CM

## 2024-03-25 DIAGNOSIS — M48.062 SPINAL STENOSIS, LUMBAR REGION WITH NEUROGENIC CLAUDICATION: ICD-10-CM

## 2024-03-25 DIAGNOSIS — E11.9 TYPE 2 DIABETES MELLITUS WITHOUT COMPLICATIONS: ICD-10-CM

## 2024-04-22 ENCOUNTER — APPOINTMENT (OUTPATIENT)
Dept: NEUROLOGY | Facility: CLINIC | Age: 74
End: 2024-04-22

## 2024-04-23 NOTE — ADDENDUM
[FreeTextEntry1] : Entered by DEBBY CMAACHO, acting as scribe for Dr. Deondre Ramos.\par The documentation recorded by the scribe accurately reflects the service I personally performed and the decisions made by me. 
Not applicable

## 2024-05-23 ENCOUNTER — APPOINTMENT (OUTPATIENT)
Dept: NEUROLOGY | Facility: CLINIC | Age: 74
End: 2024-05-23

## 2024-05-31 LAB
ESTIMATED AVERAGE GLUCOSE: 137 MG/DL
HBA1C MFR BLD HPLC: 6.4 %

## 2024-06-03 ENCOUNTER — APPOINTMENT (OUTPATIENT)
Dept: INTERNAL MEDICINE | Facility: CLINIC | Age: 74
End: 2024-06-03
Payer: MEDICARE

## 2024-06-03 ENCOUNTER — OUTPATIENT (OUTPATIENT)
Dept: OUTPATIENT SERVICES | Facility: HOSPITAL | Age: 74
LOS: 1 days | End: 2024-06-03
Payer: MEDICARE

## 2024-06-03 VITALS
OXYGEN SATURATION: 98 % | HEIGHT: 63 IN | BODY MASS INDEX: 26.75 KG/M2 | SYSTOLIC BLOOD PRESSURE: 158 MMHG | DIASTOLIC BLOOD PRESSURE: 82 MMHG | WEIGHT: 151 LBS | HEART RATE: 99 BPM

## 2024-06-03 VITALS — DIASTOLIC BLOOD PRESSURE: 78 MMHG | SYSTOLIC BLOOD PRESSURE: 130 MMHG

## 2024-06-03 DIAGNOSIS — R97.20 ELEVATED PROSTATE, SPECIFIC ANTIGEN [PSA]: ICD-10-CM

## 2024-06-03 DIAGNOSIS — M54.16 RADICULOPATHY, LUMBAR REGION: ICD-10-CM

## 2024-06-03 DIAGNOSIS — E11.9 TYPE 2 DIABETES MELLITUS W/OUT COMPLICATIONS: ICD-10-CM

## 2024-06-03 DIAGNOSIS — R40.0 SOMNOLENCE: ICD-10-CM

## 2024-06-03 DIAGNOSIS — I10 ESSENTIAL (PRIMARY) HYPERTENSION: ICD-10-CM

## 2024-06-03 DIAGNOSIS — M22.2X2 PATELLOFEMORAL DISORDERS, LEFT KNEE: ICD-10-CM

## 2024-06-03 PROCEDURE — G0463: CPT

## 2024-06-03 PROCEDURE — 99214 OFFICE O/P EST MOD 30 MIN: CPT

## 2024-06-03 PROCEDURE — G2211 COMPLEX E/M VISIT ADD ON: CPT

## 2024-06-03 RX ORDER — REPAGLINIDE 0.5 MG/1
0.5 TABLET ORAL
Qty: 180 | Refills: 3 | Status: COMPLETED | COMMUNITY
Start: 2024-02-05 | End: 2024-06-03

## 2024-06-03 NOTE — PHYSICAL EXAM
[No Acute Distress] : no acute distress [Well-Appearing] : well-appearing [No Respiratory Distress] : no respiratory distress  [No Accessory Muscle Use] : no accessory muscle use [Clear to Auscultation] : lungs were clear to auscultation bilaterally [Normal Rate] : normal rate  [Regular Rhythm] : with a regular rhythm [Normal S1, S2] : normal S1 and S2 [No Murmur] : no murmur heard [No Abdominal Bruit] : a ~M bruit was not heard ~T in the abdomen [No Carotid Bruits] : no carotid bruits [No Joint Swelling] : no joint swelling [No Rash] : no rash [Right Foot Was Examined] : Right foot ~C was examined [Left Foot Was Examined] : left foot ~C was examined [] : both feet [de-identified] : motor L4-S1 5/5 [TWNoteComboBox4] : +2

## 2024-06-03 NOTE — ASSESSMENT
[FreeTextEntry1] : 1) nonprogressive paresthesia     Nonprogressive motor weakness   CVA    F/U Ortho Spine Anticipate PT 2) DM   HbA1c 6.4%    STOP repaglinide   and walk after B and D instead of oral agent cont Jardiance    Well Controlled DM 3) Peroxyl Mouth Wrinse for GINGIVITIS 4) Patellofemoral Syndrome: Quandraceps and Hamstring stretching and PT   KNEE PAIN 5) HTN Controlled

## 2024-06-03 NOTE — HISTORY OF PRESENT ILLNESS
[FreeTextEntry1] : DM   HTN [de-identified] : 72 yo  resolved low Na prostate CA CVA 2014 L weakness needs cane to walk   CLOPIDOGREL lumbar radiculopathy + S/P CVA  EYE June 2023  CPE  July HbA1c 6.4%  jardiance  lisinopril  repaglinide  metformin EMG pending Foot  Feb 2024  NEW  Repaglinide and NEW Jardiance  improved w new medications   repaglinide pre breakgfast and pre dinner  1) L knee pain when walk  1 month 2) gum inflammation  bleeding   dentist Q 6   was told [facility]

## 2024-06-05 DIAGNOSIS — Z08 ENCOUNTER FOR FOLLOW-UP EXAMINATION AFTER COMPLETED TREATMENT FOR MALIGNANT NEOPLASM: ICD-10-CM

## 2024-06-05 DIAGNOSIS — Z85.46 ENCOUNTER FOR FOLLOW-UP EXAMINATION AFTER COMPLETED TREATMENT FOR MALIGNANT NEOPLASM: ICD-10-CM

## 2024-06-05 LAB
ALBUMIN SERPL ELPH-MCNC: 4.4 G/DL
ALP BLD-CCNC: 48 U/L
ALT SERPL-CCNC: 26 U/L
ANION GAP SERPL CALC-SCNC: 12 MMOL/L
AST SERPL-CCNC: 23 U/L
BASOPHILS # BLD AUTO: 0.06 K/UL
BASOPHILS NFR BLD AUTO: 0.8 %
BILIRUB SERPL-MCNC: 0.3 MG/DL
BUN SERPL-MCNC: 22 MG/DL
CALCIUM SERPL-MCNC: 9.2 MG/DL
CHLORIDE SERPL-SCNC: 106 MMOL/L
CHOLEST SERPL-MCNC: 173 MG/DL
CO2 SERPL-SCNC: 23 MMOL/L
CREAT SERPL-MCNC: 1.15 MG/DL
CREAT SPEC-SCNC: 22 MG/DL
EGFR: 66 ML/MIN/1.73M2
EOSINOPHIL # BLD AUTO: 0.14 K/UL
EOSINOPHIL NFR BLD AUTO: 1.9 %
GLUCOSE SERPL-MCNC: 102 MG/DL
HCT VFR BLD CALC: 44.1 %
HDLC SERPL-MCNC: 52 MG/DL
HGB BLD-MCNC: 14.3 G/DL
IMM GRANULOCYTES NFR BLD AUTO: 0.3 %
LDLC SERPL CALC-MCNC: 95 MG/DL
LYMPHOCYTES # BLD AUTO: 2.22 K/UL
LYMPHOCYTES NFR BLD AUTO: 29.5 %
MAN DIFF?: NORMAL
MCHC RBC-ENTMCNC: 28.7 PG
MCHC RBC-ENTMCNC: 32.4 GM/DL
MCV RBC AUTO: 88.4 FL
MICROALBUMIN 24H UR DL<=1MG/L-MCNC: <1.2 MG/DL
MICROALBUMIN/CREAT 24H UR-RTO: NORMAL MG/G
MONOCYTES # BLD AUTO: 0.54 K/UL
MONOCYTES NFR BLD AUTO: 7.2 %
NEUTROPHILS # BLD AUTO: 4.55 K/UL
NEUTROPHILS NFR BLD AUTO: 60.3 %
NONHDLC SERPL-MCNC: 121 MG/DL
PLATELET # BLD AUTO: 259 K/UL
POTASSIUM SERPL-SCNC: 4.4 MMOL/L
PROT SERPL-MCNC: 7.2 G/DL
PSA SERPL-MCNC: 10.6 NG/ML
RBC # BLD: 4.99 M/UL
RBC # FLD: 14 %
SODIUM SERPL-SCNC: 141 MMOL/L
TRIGL SERPL-MCNC: 149 MG/DL
WBC # FLD AUTO: 7.53 K/UL

## 2024-06-06 ENCOUNTER — APPOINTMENT (OUTPATIENT)
Dept: UROLOGY | Facility: CLINIC | Age: 74
End: 2024-06-06
Payer: MEDICARE

## 2024-06-06 DIAGNOSIS — N48.1 BALANITIS: ICD-10-CM

## 2024-06-06 DIAGNOSIS — Z87.438 PERSONAL HISTORY OF OTHER DISEASES OF MALE GENITAL ORGANS: ICD-10-CM

## 2024-06-06 PROCEDURE — G2211 COMPLEX E/M VISIT ADD ON: CPT

## 2024-06-06 PROCEDURE — 99214 OFFICE O/P EST MOD 30 MIN: CPT

## 2024-06-06 RX ORDER — FINASTERIDE 5 MG/1
5 TABLET, FILM COATED ORAL DAILY
Qty: 90 | Refills: 3 | Status: ACTIVE | COMMUNITY
Start: 2023-02-16 | End: 1900-01-01

## 2024-06-06 RX ORDER — TAMSULOSIN HYDROCHLORIDE 0.4 MG/1
0.4 CAPSULE ORAL
Qty: 180 | Refills: 3 | Status: ACTIVE | COMMUNITY
Start: 2022-09-01 | End: 1900-01-01

## 2024-06-06 RX ORDER — CLOTRIMAZOLE AND BETAMETHASONE DIPROPIONATE 10; .5 MG/G; MG/G
1-0.05 CREAM TOPICAL TWICE DAILY
Qty: 1 | Refills: 3 | Status: ACTIVE | COMMUNITY
Start: 2024-06-06 | End: 1900-01-01

## 2024-06-07 NOTE — LETTER BODY
[FreeTextEntry1] : Tommy Angela  [facility], [address], [place] [address] [phone]   Dear Dr. Angela,    Reason for visit: Elevated PSA. BPH. Prostate cancer. Balanitis    This is a 73 year old male with elevated PSA, prostate cancer, BPH, and UTI. Prostate biopsy demonstrated evidence of [place] 6 adenocarcinoma of the prostate involving 1 core in 15 biopsies. Patient is on active surveillance. He was hospitalized after the procedure for hyponitremia. The etiology of his hyponatremia was unclear. It may have been related to his fluid intake and emesis. His previous UTI was treated with antibiotics. He had balanitis. He is here today for a follow up. Since he was last seen, he has been taking Proscar and Flomax BID regularly with no side effects or difficulties on the medication. He reports stable symptoms on medical therapy. Patient reports persistent symptoms of balanitis. He denies any fevers or chills. He denies any pain. Patient denies any changes in health. The past medical history and family history and social history are unchanged. All other review of systems are negative. Patient denies any changes in medications. Medication list was reconciled.    On examination, the patient is in no acute distress. He is alert and oriented follows commands. He has normal mood and affect. He is normocephalic. Neck is supple. Oral no thrush Respirations are unlabored. His abdomen is soft and nontender. Bladder is nonpalpable. No CVA tenderness. Neurologically he is grossly intact. No peripheral edema. Skin without gross abnormality     Assessment: Prostate cancer on active surveillance. BPH..    I counseled the patient. In terms of his BPH, his symptoms are stable on Proscar and Flomax BID.  I renewed the patient's prescription for Flomax and Proscar today. I encouraged the patient to continue medications regularly as directed. In terms of his prostate cancer, he will continue with active surveillance. I recommended the patient repeat PSA and BMP to establish baseline. In terms of his balanitis, the patient reported persistent symptoms. I explained that because he is uncircumcised, he is more likely to develop balanitis as a result of improper hygiene compared to circumcised individuals. I recommended the patient start a trial of Clotrimazole-Betamethasone cream. I discussed the potential side effects of the medication. I counseled the patient on its use and side effects. If the patient develops any side effects, the patient will discontinue medication and contact me. I also recommended the patient consider circumcision if symptoms persist. Risks and alternatives were discussed. I answered the patient questions. The patient will follow-up as directed and will contact me with any questions or concerns.    Plan: Trial of Clotrimazole-Betamethasone cream. Continue Proscar and Flomax BID. PSA. BMP. Active surveillance. Consider circumcision. Follow up in 6 months.

## 2024-06-07 NOTE — ADDENDUM
[FreeTextEntry1] : Entered by Arian Wilcox, acting as scribe for Dr. Deondre Ramos. The documentation recorded by the scribe accurately reflects the service I personally performed and the decisions made by me.

## 2024-06-07 NOTE — HISTORY OF PRESENT ILLNESS
[FreeTextEntry1] : Follow-up BPH.  Flomax Proscar.  Follow-up prostate cancer.  Active surveillance.  PSA.  Follow-up 6 months.  Patient reports persistent balanitis.  Clotrimazole-Betamethasone.  Consider circumcision.  Please refer to URO Consult Note.

## 2024-06-10 DIAGNOSIS — M54.16 RADICULOPATHY, LUMBAR REGION: ICD-10-CM

## 2024-06-10 DIAGNOSIS — E11.9 TYPE 2 DIABETES MELLITUS WITHOUT COMPLICATIONS: ICD-10-CM

## 2024-06-10 DIAGNOSIS — R97.20 ELEVATED PROSTATE SPECIFIC ANTIGEN [PSA]: ICD-10-CM

## 2024-06-10 DIAGNOSIS — M22.2X2 PATELLOFEMORAL DISORDERS, LEFT KNEE: ICD-10-CM

## 2024-06-10 DIAGNOSIS — R40.0 SOMNOLENCE: ICD-10-CM

## 2024-07-02 ENCOUNTER — APPOINTMENT (OUTPATIENT)
Dept: NEUROLOGY | Facility: CLINIC | Age: 74
End: 2024-07-02

## 2024-08-27 ENCOUNTER — APPOINTMENT (OUTPATIENT)
Dept: OTOLARYNGOLOGY | Facility: CLINIC | Age: 74
End: 2024-08-27

## 2024-10-03 ENCOUNTER — RX RENEWAL (OUTPATIENT)
Age: 74
End: 2024-10-03

## 2024-11-05 ENCOUNTER — APPOINTMENT (OUTPATIENT)
Dept: OTOLARYNGOLOGY | Facility: CLINIC | Age: 74
End: 2024-11-05
Payer: MEDICARE

## 2024-11-05 VITALS
WEIGHT: 152 LBS | HEIGHT: 63 IN | SYSTOLIC BLOOD PRESSURE: 143 MMHG | HEART RATE: 76 BPM | BODY MASS INDEX: 26.93 KG/M2 | DIASTOLIC BLOOD PRESSURE: 63 MMHG

## 2024-11-05 DIAGNOSIS — H61.23 IMPACTED CERUMEN, BILATERAL: ICD-10-CM

## 2024-11-05 DIAGNOSIS — L29.9 PRURITUS, UNSPECIFIED: ICD-10-CM

## 2024-11-05 DIAGNOSIS — J39.2 OTHER DISEASES OF PHARYNX: ICD-10-CM

## 2024-11-05 PROCEDURE — 99213 OFFICE O/P EST LOW 20 MIN: CPT | Mod: 25

## 2024-11-05 PROCEDURE — 69210 REMOVE IMPACTED EAR WAX UNI: CPT

## 2024-11-05 RX ORDER — FLUTICASONE PROPIONATE 50 UG/1
50 SPRAY, METERED NASAL DAILY
Qty: 1 | Refills: 3 | Status: ACTIVE | COMMUNITY
Start: 2024-11-05 | End: 1900-01-01

## 2024-11-25 ENCOUNTER — NON-APPOINTMENT (OUTPATIENT)
Age: 74
End: 2024-11-25

## 2024-11-25 ENCOUNTER — OUTPATIENT (OUTPATIENT)
Dept: OUTPATIENT SERVICES | Facility: HOSPITAL | Age: 74
LOS: 1 days | End: 2024-11-25
Payer: MEDICARE

## 2024-11-25 ENCOUNTER — APPOINTMENT (OUTPATIENT)
Dept: INTERNAL MEDICINE | Facility: CLINIC | Age: 74
End: 2024-11-25
Payer: MEDICARE

## 2024-11-25 VITALS
OXYGEN SATURATION: 98 % | DIASTOLIC BLOOD PRESSURE: 60 MMHG | BODY MASS INDEX: 25.87 KG/M2 | SYSTOLIC BLOOD PRESSURE: 160 MMHG | HEART RATE: 80 BPM | HEIGHT: 63 IN | WEIGHT: 146 LBS

## 2024-11-25 DIAGNOSIS — Z12.11 ENCOUNTER FOR SCREENING FOR MALIGNANT NEOPLASM OF COLON: ICD-10-CM

## 2024-11-25 DIAGNOSIS — E11.9 TYPE 2 DIABETES MELLITUS W/OUT COMPLICATIONS: ICD-10-CM

## 2024-11-25 DIAGNOSIS — C61 MALIGNANT NEOPLASM OF PROSTATE: ICD-10-CM

## 2024-11-25 DIAGNOSIS — I10 ESSENTIAL (PRIMARY) HYPERTENSION: ICD-10-CM

## 2024-11-25 DIAGNOSIS — I63.9 CEREBRAL INFARCTION, UNSPECIFIED: ICD-10-CM

## 2024-11-25 PROCEDURE — 83036 HEMOGLOBIN GLYCOSYLATED A1C: CPT

## 2024-11-25 PROCEDURE — G0439: CPT

## 2024-11-25 PROCEDURE — 93010 ELECTROCARDIOGRAM REPORT: CPT

## 2024-11-26 LAB — HBA1C MFR BLD HPLC: 7.2

## 2024-11-27 LAB
BASOPHILS # BLD AUTO: 0.06 K/UL
BASOPHILS NFR BLD AUTO: 0.7 %
EOSINOPHIL # BLD AUTO: 0.16 K/UL
EOSINOPHIL NFR BLD AUTO: 1.9 %
HCT VFR BLD CALC: 49.2 %
HGB BLD-MCNC: 15.5 G/DL
IMM GRANULOCYTES NFR BLD AUTO: 0.4 %
LYMPHOCYTES # BLD AUTO: 2.86 K/UL
LYMPHOCYTES NFR BLD AUTO: 34.2 %
MAN DIFF?: NORMAL
MCHC RBC-ENTMCNC: 28.4 PG
MCHC RBC-ENTMCNC: 31.5 G/DL
MCV RBC AUTO: 90.1 FL
MONOCYTES # BLD AUTO: 0.43 K/UL
MONOCYTES NFR BLD AUTO: 5.1 %
NEUTROPHILS # BLD AUTO: 4.82 K/UL
NEUTROPHILS NFR BLD AUTO: 57.7 %
PLATELET # BLD AUTO: 263 K/UL
RBC # BLD: 5.46 M/UL
RBC # FLD: 13.3 %
WBC # FLD AUTO: 8.36 K/UL

## 2024-11-28 LAB
CREAT SPEC-SCNC: 31 MG/DL
CREAT SPEC-SCNC: 31 MG/DL
CREAT/PROT UR: 0.1 RATIO
ESTIMATED AVERAGE GLUCOSE: 146 MG/DL
HBA1C MFR BLD HPLC: 6.7 %
MICROALBUMIN 24H UR DL<=1MG/L-MCNC: <1.2 MG/DL
MICROALBUMIN/CREAT 24H UR-RTO: NORMAL MG/G
PROT UR-MCNC: 4 MG/DL
PSA FREE FLD-MCNC: 10 %
PSA FREE SERPL-MCNC: 1.36 NG/ML
PSA SERPL-MCNC: 13.5 NG/ML

## 2024-12-02 LAB
ALBUMIN SERPL ELPH-MCNC: 4.5 G/DL
ALP BLD-CCNC: 60 U/L
ALT SERPL-CCNC: 18 U/L
ANION GAP SERPL CALC-SCNC: 21 MMOL/L
AST SERPL-CCNC: 20 U/L
BILIRUB SERPL-MCNC: 0.5 MG/DL
BUN SERPL-MCNC: 22 MG/DL
CALCIUM SERPL-MCNC: 9.8 MG/DL
CHLORIDE SERPL-SCNC: 103 MMOL/L
CHOLEST SERPL-MCNC: 203 MG/DL
CO2 SERPL-SCNC: 19 MMOL/L
CREAT SERPL-MCNC: 1.16 MG/DL
EGFR: 65 ML/MIN/1.73M2
GLUCOSE SERPL-MCNC: 120 MG/DL
HDLC SERPL-MCNC: 64 MG/DL
LDLC SERPL CALC-MCNC: 119 MG/DL
NONHDLC SERPL-MCNC: 139 MG/DL
POTASSIUM SERPL-SCNC: 4.7 MMOL/L
PROT SERPL-MCNC: 7.6 G/DL
SODIUM SERPL-SCNC: 144 MMOL/L
TRIGL SERPL-MCNC: 114 MG/DL

## 2024-12-03 DIAGNOSIS — I63.9 CEREBRAL INFARCTION, UNSPECIFIED: ICD-10-CM

## 2024-12-03 DIAGNOSIS — Z00.00 ENCOUNTER FOR GENERAL ADULT MEDICAL EXAMINATION WITHOUT ABNORMAL FINDINGS: ICD-10-CM

## 2024-12-03 DIAGNOSIS — C61 MALIGNANT NEOPLASM OF PROSTATE: ICD-10-CM

## 2024-12-03 DIAGNOSIS — E11.9 TYPE 2 DIABETES MELLITUS WITHOUT COMPLICATIONS: ICD-10-CM

## 2024-12-03 DIAGNOSIS — Z12.11 ENCOUNTER FOR SCREENING FOR MALIGNANT NEOPLASM OF COLON: ICD-10-CM

## 2024-12-11 RX ORDER — SODIUM PHOSPHATE, DIBASIC AND SODIUM PHOSPHATE, MONOBASIC 7; 19 G/230ML; G/230ML
ENEMA RECTAL
Qty: 1 | Refills: 0 | Status: ACTIVE | COMMUNITY
Start: 2024-12-11 | End: 1900-01-01

## 2024-12-12 ENCOUNTER — APPOINTMENT (OUTPATIENT)
Dept: UROLOGY | Facility: CLINIC | Age: 74
End: 2024-12-12
Payer: MEDICARE

## 2024-12-12 DIAGNOSIS — C61 MALIGNANT NEOPLASM OF PROSTATE: ICD-10-CM

## 2024-12-12 DIAGNOSIS — Z85.46 ENCOUNTER FOR FOLLOW-UP EXAMINATION AFTER COMPLETED TREATMENT FOR MALIGNANT NEOPLASM: ICD-10-CM

## 2024-12-12 DIAGNOSIS — Z08 ENCOUNTER FOR FOLLOW-UP EXAMINATION AFTER COMPLETED TREATMENT FOR MALIGNANT NEOPLASM: ICD-10-CM

## 2024-12-12 DIAGNOSIS — R97.20 ELEVATED PROSTATE, SPECIFIC ANTIGEN [PSA]: ICD-10-CM

## 2024-12-12 DIAGNOSIS — N48.1 BALANITIS: ICD-10-CM

## 2024-12-12 PROCEDURE — G2211 COMPLEX E/M VISIT ADD ON: CPT

## 2024-12-12 PROCEDURE — 99214 OFFICE O/P EST MOD 30 MIN: CPT

## 2024-12-12 RX ORDER — PREDNISONE 50 MG/1
50 TABLET ORAL
Qty: 3 | Refills: 0 | Status: ACTIVE | COMMUNITY
Start: 2024-12-12 | End: 1900-01-01

## 2024-12-12 RX ORDER — CAMPHOR 0.45 %
25 GEL (GRAM) TOPICAL
Qty: 1 | Refills: 0 | Status: ACTIVE | COMMUNITY
Start: 2024-12-12 | End: 1900-01-01

## 2024-12-14 LAB
ANION GAP SERPL CALC-SCNC: 11 MMOL/L
APPEARANCE: CLEAR
BACTERIA: NEGATIVE /HPF
BILIRUBIN URINE: NEGATIVE
BLOOD URINE: NEGATIVE
BUN SERPL-MCNC: 28 MG/DL
CALCIUM SERPL-MCNC: 9.9 MG/DL
CAST: 0 /LPF
CHLORIDE SERPL-SCNC: 102 MMOL/L
CO2 SERPL-SCNC: 27 MMOL/L
COLOR: YELLOW
CREAT SERPL-MCNC: 1.14 MG/DL
EGFR: 67 ML/MIN/1.73M2
EPITHELIAL CELLS: 0 /HPF
GLUCOSE QUALITATIVE U: >=1000 MG/DL
GLUCOSE SERPL-MCNC: 119 MG/DL
KETONES URINE: NEGATIVE MG/DL
LEUKOCYTE ESTERASE URINE: NEGATIVE
MICROSCOPIC-UA: NORMAL
NITRITE URINE: NEGATIVE
PH URINE: 6.5
POTASSIUM SERPL-SCNC: 4.7 MMOL/L
PROTEIN URINE: NEGATIVE MG/DL
PSA FREE FLD-MCNC: 12 %
PSA FREE SERPL-MCNC: 1.57 NG/ML
PSA SERPL-MCNC: 13 NG/ML
RED BLOOD CELLS URINE: 0 /HPF
SODIUM SERPL-SCNC: 140 MMOL/L
SPECIFIC GRAVITY URINE: >1.03
UROBILINOGEN URINE: 0.2 MG/DL
WHITE BLOOD CELLS URINE: 0 /HPF

## 2024-12-16 ENCOUNTER — APPOINTMENT (OUTPATIENT)
Dept: MRI IMAGING | Facility: IMAGING CENTER | Age: 74
End: 2024-12-16
Payer: MEDICARE

## 2024-12-16 ENCOUNTER — OUTPATIENT (OUTPATIENT)
Dept: OUTPATIENT SERVICES | Facility: HOSPITAL | Age: 74
LOS: 1 days | End: 2024-12-16
Payer: MEDICARE

## 2024-12-16 ENCOUNTER — RESULT REVIEW (OUTPATIENT)
Age: 74
End: 2024-12-16

## 2024-12-16 DIAGNOSIS — Z08 ENCOUNTER FOR FOLLOW-UP EXAMINATION AFTER COMPLETED TREATMENT FOR MALIGNANT NEOPLASM: ICD-10-CM

## 2024-12-16 DIAGNOSIS — N48.1 BALANITIS: ICD-10-CM

## 2024-12-16 LAB — URINE CYTOLOGY: NORMAL

## 2024-12-16 PROCEDURE — 72197 MRI PELVIS W/O & W/DYE: CPT

## 2024-12-16 PROCEDURE — 72197 MRI PELVIS W/O & W/DYE: CPT | Mod: 26,MH

## 2024-12-16 PROCEDURE — 76498P: CUSTOM | Mod: 26,MH

## 2024-12-16 PROCEDURE — 76498 UNLISTED MR PROCEDURE: CPT

## 2024-12-16 PROCEDURE — A9585: CPT

## 2024-12-18 ENCOUNTER — NON-APPOINTMENT (OUTPATIENT)
Age: 74
End: 2024-12-18

## 2025-01-14 ENCOUNTER — APPOINTMENT (OUTPATIENT)
Dept: HEPATOLOGY | Facility: CLINIC | Age: 75
End: 2025-01-14

## 2025-01-27 ENCOUNTER — APPOINTMENT (OUTPATIENT)
Dept: UROLOGY | Facility: CLINIC | Age: 75
End: 2025-01-27

## 2025-03-18 ENCOUNTER — APPOINTMENT (OUTPATIENT)
Dept: INTERNAL MEDICINE | Facility: CLINIC | Age: 75
End: 2025-03-18

## 2025-06-10 ENCOUNTER — APPOINTMENT (OUTPATIENT)
Dept: OTOLARYNGOLOGY | Facility: CLINIC | Age: 75
End: 2025-06-10
Payer: MEDICARE

## 2025-06-10 PROBLEM — R04.0 EPISTAXIS: Status: ACTIVE | Noted: 2025-06-10

## 2025-06-10 PROBLEM — R04.0 FREQUENT NOSEBLEEDS: Status: ACTIVE | Noted: 2025-06-10

## 2025-06-10 PROCEDURE — 31231 NASAL ENDOSCOPY DX: CPT

## 2025-06-10 PROCEDURE — 99213 OFFICE O/P EST LOW 20 MIN: CPT | Mod: 25

## 2025-08-08 ENCOUNTER — OUTPATIENT (OUTPATIENT)
Dept: OUTPATIENT SERVICES | Facility: HOSPITAL | Age: 75
LOS: 1 days | End: 2025-08-08
Payer: MEDICARE

## 2025-08-08 ENCOUNTER — RESULT CHARGE (OUTPATIENT)
Age: 75
End: 2025-08-08

## 2025-08-08 ENCOUNTER — APPOINTMENT (OUTPATIENT)
Dept: INTERNAL MEDICINE | Facility: CLINIC | Age: 75
End: 2025-08-08
Payer: MEDICARE

## 2025-08-08 VITALS
SYSTOLIC BLOOD PRESSURE: 100 MMHG | HEIGHT: 63 IN | HEART RATE: 78 BPM | DIASTOLIC BLOOD PRESSURE: 60 MMHG | WEIGHT: 137 LBS | BODY MASS INDEX: 24.27 KG/M2 | OXYGEN SATURATION: 98 %

## 2025-08-08 DIAGNOSIS — I63.9 CEREBRAL INFARCTION, UNSPECIFIED: ICD-10-CM

## 2025-08-08 DIAGNOSIS — I10 ESSENTIAL (PRIMARY) HYPERTENSION: ICD-10-CM

## 2025-08-08 DIAGNOSIS — E11.9 TYPE 2 DIABETES MELLITUS W/OUT COMPLICATIONS: ICD-10-CM

## 2025-08-08 PROCEDURE — 83036 HEMOGLOBIN GLYCOSYLATED A1C: CPT

## 2025-08-08 PROCEDURE — G0463: CPT

## 2025-08-08 PROCEDURE — 99214 OFFICE O/P EST MOD 30 MIN: CPT

## 2025-08-08 RX ORDER — EZETIMIBE 10 MG/1
10 TABLET ORAL
Qty: 90 | Refills: 3 | Status: ACTIVE | COMMUNITY
Start: 2025-08-08 | End: 1900-01-01

## 2025-08-13 LAB
ALBUMIN, RANDOM URINE: <1.2 MG/DL
ANION GAP SERPL CALC-SCNC: 12 MMOL/L
BUN SERPL-MCNC: 19 MG/DL
CALCIUM SERPL-MCNC: 10.1 MG/DL
CHLORIDE SERPL-SCNC: 101 MMOL/L
CO2 SERPL-SCNC: 28 MMOL/L
CREAT SERPL-MCNC: 1.01 MG/DL
CREAT SPEC-SCNC: 47 MG/DL
EGFRCR SERPLBLD CKD-EPI 2021: 77 ML/MIN/1.73M2
GLUCOSE SERPL-MCNC: 139 MG/DL
MICROALBUMIN/CREAT 24H UR-RTO: NORMAL MG/G
POTASSIUM SERPL-SCNC: 5 MMOL/L
SODIUM SERPL-SCNC: 141 MMOL/L

## 2025-08-21 DIAGNOSIS — E11.9 TYPE 2 DIABETES MELLITUS WITHOUT COMPLICATIONS: ICD-10-CM

## 2025-08-21 DIAGNOSIS — I63.9 CEREBRAL INFARCTION, UNSPECIFIED: ICD-10-CM
